# Patient Record
Sex: FEMALE | Race: WHITE | NOT HISPANIC OR LATINO | Employment: FULL TIME | URBAN - METROPOLITAN AREA
[De-identification: names, ages, dates, MRNs, and addresses within clinical notes are randomized per-mention and may not be internally consistent; named-entity substitution may affect disease eponyms.]

---

## 2023-10-24 ENCOUNTER — TRANSCRIBE ORDERS (OUTPATIENT)
Dept: ORTHOPEDIC SURGERY | Facility: HOSPITAL | Age: 28
End: 2023-10-24
Payer: MEDICAID

## 2023-10-24 DIAGNOSIS — M54.50 LOW BACK PAIN, UNSPECIFIED BACK PAIN LATERALITY, UNSPECIFIED CHRONICITY, UNSPECIFIED WHETHER SCIATICA PRESENT: ICD-10-CM

## 2023-10-26 PROBLEM — R10.9 STOMACH PAIN: Status: ACTIVE | Noted: 2023-10-26

## 2023-10-26 PROBLEM — Z97.5 USES INTRAUTERINE DEVICE FOR BIRTH CONTROL: Status: ACTIVE | Noted: 2023-10-26

## 2023-10-26 PROBLEM — R10.9 ABDOMINAL PAIN: Status: ACTIVE | Noted: 2023-10-26

## 2023-10-26 PROBLEM — N73.9 FEMALE PELVIC INFLAMMATORY DISEASE: Status: ACTIVE | Noted: 2023-10-26

## 2023-10-26 PROBLEM — N76.6 GENITAL ULCER, FEMALE: Status: ACTIVE | Noted: 2023-10-26

## 2023-10-26 PROBLEM — R31.9 HEMATURIA: Status: ACTIVE | Noted: 2023-10-26

## 2023-10-26 PROBLEM — N92.6 IRREGULAR BLEEDING: Status: ACTIVE | Noted: 2023-10-26

## 2023-10-26 PROBLEM — N70.93 TUBO-OVARIAN ABSCESS: Status: ACTIVE | Noted: 2023-10-26

## 2023-10-26 PROBLEM — N39.0 URINARY TRACT INFECTION: Status: RESOLVED | Noted: 2023-10-26 | Resolved: 2023-10-26

## 2023-10-26 PROBLEM — R50.9 FEVER: Status: ACTIVE | Noted: 2023-10-26

## 2023-10-26 PROBLEM — N39.0 URINARY TRACT INFECTION: Status: ACTIVE | Noted: 2023-10-26

## 2023-10-26 PROBLEM — N93.9 ABNORMAL VAGINAL BLEEDING: Status: ACTIVE | Noted: 2023-10-26

## 2023-10-26 PROBLEM — N90.89 VULVAR LESION: Status: ACTIVE | Noted: 2023-10-26

## 2023-10-26 PROBLEM — R10.2 VAGINAL PAIN: Status: ACTIVE | Noted: 2023-10-26

## 2023-10-26 RX ORDER — NITROFURANTOIN 25; 75 MG/1; MG/1
100 CAPSULE ORAL EVERY 12 HOURS
COMMUNITY
Start: 2022-04-18 | End: 2024-04-02 | Stop reason: WASHOUT

## 2023-10-26 RX ORDER — ACYCLOVIR 800 MG/1
800 TABLET ORAL
COMMUNITY
Start: 2022-04-21 | End: 2024-04-02 | Stop reason: WASHOUT

## 2023-10-27 ENCOUNTER — ANCILLARY PROCEDURE (OUTPATIENT)
Dept: RADIOLOGY | Facility: CLINIC | Age: 28
End: 2023-10-27
Payer: MEDICAID

## 2023-10-27 ENCOUNTER — OFFICE VISIT (OUTPATIENT)
Dept: ORTHOPEDIC SURGERY | Facility: CLINIC | Age: 28
End: 2023-10-27
Payer: MEDICAID

## 2023-10-27 DIAGNOSIS — M25.551 HIP PAIN, ACUTE, RIGHT: ICD-10-CM

## 2023-10-27 DIAGNOSIS — M54.50 LOW BACK PAIN, UNSPECIFIED BACK PAIN LATERALITY, UNSPECIFIED CHRONICITY, UNSPECIFIED WHETHER SCIATICA PRESENT: ICD-10-CM

## 2023-10-27 DIAGNOSIS — M25.551 HIP PAIN, ACUTE, RIGHT: Primary | ICD-10-CM

## 2023-10-27 PROCEDURE — 73502 X-RAY EXAM HIP UNI 2-3 VIEWS: CPT | Mod: RIGHT SIDE | Performed by: RADIOLOGY

## 2023-10-27 PROCEDURE — 73502 X-RAY EXAM HIP UNI 2-3 VIEWS: CPT | Mod: RT

## 2023-10-27 PROCEDURE — 72114 X-RAY EXAM L-S SPINE BENDING: CPT | Performed by: RADIOLOGY

## 2023-10-27 PROCEDURE — 72120 X-RAY BEND ONLY L-S SPINE: CPT

## 2023-10-27 PROCEDURE — 99203 OFFICE O/P NEW LOW 30 MIN: CPT | Performed by: ORTHOPAEDIC SURGERY

## 2023-10-27 PROCEDURE — 72110 X-RAY EXAM L-2 SPINE 4/>VWS: CPT | Mod: FY

## 2023-10-27 PROCEDURE — 1036F TOBACCO NON-USER: CPT | Performed by: ORTHOPAEDIC SURGERY

## 2023-10-27 ASSESSMENT — PAIN DESCRIPTION - DESCRIPTORS: DESCRIPTORS: SHARP;SHOOTING

## 2023-10-27 ASSESSMENT — PAIN SCALES - GENERAL: PAINLEVEL_OUTOF10: 3

## 2023-10-27 ASSESSMENT — PAIN - FUNCTIONAL ASSESSMENT: PAIN_FUNCTIONAL_ASSESSMENT: 0-10

## 2023-10-27 NOTE — PROGRESS NOTES
HPI:Fauzia Patel is a 28-year-old woman, who comes in with complaints of right buttock and thigh pain.  Her past medical history is significant for prior hip pinning as a child 12 years ago at the [facility].  She has pain with activity.  She denies numbness and tingling.      ROS:  Reviewed on EMR and patient intake sheet.    PMH/SH:  Reviewed on EMR and patient intake sheet.    Exam:  Physical Exam    Constitutional: Well appearing; no acute distress  Eyes: pupils are equal and round  Psych: normal affect  Respiratory: non-labored breathing  Cardiovascular: regular rate and rhythm  GI: non-distended abdomen  Musculoskeletal: Significant pain with attempted range of motion of the right hip   neurologic: [4 with the exception of 2/5 in right hip flexion]/5 strength in the lower extremities bilaterally and knee extension]; [-] straight leg raise; no clonus; negative babinski    Radiology:     X-rays demonstrate severe degeneration of the right hip with significant heterotopic ossification and the presence of 3 cannulated screws from prior hip pinning    Diagnosis:    Posttraumatic arthritis of the right hip with superimposed heterotopic ossification    Assessment and Plan:   28-year-old woman with severe posttraumatic arthritis in the right hip.  She will require evaluation by one of my colleagues for potential hip replacement.  I can see her back as needed.  Her lumbar spine would not require any treatment at this time.    The patient was in agreement with the plan. At the end of the visit today, the patient felt that all questions had been answered satisfactorily.  The patient was pleased with the visit and very appreciative for the care rendered.     Thank you very much for the kind referral.  It is a privilege, and a pleasure, to partner with you in the care of your patients.  I would be delighted to assist you with any further consultations as needed.  Please feel free to have your patients refer to my  website, [email or web address], for patient education materials regarding treatment options for common spinal conditions.        Margarito Landaverde MD    Chief of Spine Surgery, [facility]  Director of Spine Service, [facility]  , Department of Orthopaedics  [facility] [employer] [facility]  [address] [place] [address]  [email or web address]  P: [phone]    This note was dictated with voice recognition software.  It has not been proofread for grammatical errors, typographical mistakes or other semantic inconsistencies.

## 2023-12-13 ENCOUNTER — OFFICE VISIT (OUTPATIENT)
Dept: ORTHOPEDIC SURGERY | Facility: CLINIC | Age: 28
End: 2023-12-13
Payer: MEDICAID

## 2023-12-13 DIAGNOSIS — M16.51 UNILATERAL POST-TRAUMATIC OSTEOARTHRITIS, RIGHT HIP: Primary | ICD-10-CM

## 2023-12-13 DIAGNOSIS — M25.80 HETEROTOPIC OSSIFICATION OF JOINT: ICD-10-CM

## 2023-12-13 DIAGNOSIS — M25.551 HIP PAIN, ACUTE, RIGHT: ICD-10-CM

## 2023-12-13 PROCEDURE — 99214 OFFICE O/P EST MOD 30 MIN: CPT | Performed by: STUDENT IN AN ORGANIZED HEALTH CARE EDUCATION/TRAINING PROGRAM

## 2023-12-13 PROCEDURE — 1036F TOBACCO NON-USER: CPT | Performed by: STUDENT IN AN ORGANIZED HEALTH CARE EDUCATION/TRAINING PROGRAM

## 2023-12-13 ASSESSMENT — PAIN SCALES - GENERAL: PAINLEVEL_OUTOF10: 4

## 2023-12-13 ASSESSMENT — PAIN - FUNCTIONAL ASSESSMENT: PAIN_FUNCTIONAL_ASSESSMENT: 0-10

## 2023-12-13 NOTE — PROGRESS NOTES
Chief complaint: Right hip pain    Fauzia Patel is a pleasant 28 y.o. year-old female who is seen today for evaluation of right hip pain.  She has a history of right femoral neck pathologic fracture due to benign cyst with curettage grafting and prophylactic percutaneous screw fixation done at the [facility] back in 2011.  They used an anterior Suh-Abarca approach.  Since then, Fauzia reports that over the last 6 years she has had progressively worsening right hip pain.  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  She has known excruciating pain from her right hip, unable to stand for more than 1 hour at a time at her job working as a .  She endorses right hip stiffness, incredible pain that wakes her up at night.  She is very frustrated with the current level of pain in her right hip and how it limits her mobility and ability to perform her activities of daily living. The patient denies any numbness or tingling of the right lower extremity.    History reviewed. No pertinent past medical history.    Past Surgical History:   Procedure Laterality Date    CT GUIDED PERCUTANEOUS PERITONEAL OR RETROPERITONEAL FLUID COLLECTION DRAINAGE  2/13/2023    CT GUIDED PERCUTANEOUS PERITONEAL OR RETROPERITONEAL FLUID COLLECTION DRAINAGE [facility]     Social History     Socioeconomic History    Marital status: Single     Spouse name: None    Number of children: None    Years of education: None    Highest education level: None   Occupational History    None   Tobacco Use    Smoking status: Never     Passive exposure: Never    Smokeless tobacco: Never   Substance and Sexual Activity    Alcohol use: Never    Drug use: Never    Sexual activity: None   Other Topics Concern    None   Social History Narrative    None     Social Determinants of Health     Financial Resource Strain: Not on file   Food Insecurity: Not on file   Transportation Needs: Not on file    Physical Activity: Not on file   Stress: Not on file   Social Connections: Not on file   Intimate Partner Violence: Not on file   Housing Stability: Not on file     No Known Allergies      General: Well-appearing female in no acute distress.  Awake, alert and oriented.  Pleasant and cooperative.  Respiratory: Non-labored breathing  Mood: Euthymic   Gait: Antalgic  Assistive Device: None     Limb Length Discrepancy: 5 mm    Affected Right Hip  Range of motion:   Flexion: 70  Extension: 0  Internal Rotation: 5  External Rotation: 10  Abduction: 15  Hip Flexor Strength: 5/5  Abductor Strength: 5/5  Adductor Strength: 5/5  Tenderness: With hip thrust  Sensation: Intact to light touch distally  Motor function: Able to fire TA, EHL, G/S  Pulses: Palpable DP pulse    Unaffected Left Hip  Range of motion:   Flexion: 120  Extension: 0  Internal Rotation: 20  External Rotation: 40  Abduction: 35  Hip Flexor Strength: 5/5  Abductor Strength: 5/5  Adductor Strength: 5/5  Tenderness: None  Sensation: Intact to light touch distally  Motor function: Able to fire TA, EHL, G/S    Imaging:   AP pelvis, AP hip and false profile views: Independent review of right hip and pelvis x-rays was performed. The findings were reviewed with the patient. There are severe degenerative changes of the right hip with with evidence of prior screw fixation with associated joint space narrowing, subchondral sclerosis, and osteophyte formation. No evidence of fracture, AVN, dislocation, osteomyelitis.    Assessment/Plan:    Fauzia Patel is a very pleasant healthy 28 y.o. female presents with severe posttraumatic arthritis of the right hip.  She has a history of right femoral neck bone cyst curettage and grafting with screw fixation back in 2011 at [facility].  They used Synthes 6.5 mm cannulated screws x 3.  Since then she has developed severe arthritis of the right hip.  She is incredibly limited in her activities and is in constant pain.  She presents  today wanting to discuss total hip arthroplasty.  Reviewed treatment options with the patient, including conservative treatment versus total hip replacement.  At this time, I informed her that she would likely received minimal benefit from conservative treatment given the level of progression of her hip arthritis.  Discussed total hip arthroplasty with her in detail, including honestly discussing the risk of future revision given she will be getting a hip replacement before the age of 29.  Explained in detail the risks, benefits, alternatives to total hip arthroplasty.  She understood the inevitable risk of revision surgery given the average implant life expectancy of around 20 to 30 years.  Given the impact of her right hip pain on her quality of life, she would like to proceed with total hip arthroplasty.  She is curious if she might be able to receive surgery sooner than the earliest operative date that I might be able to give her in April 2024.  I will reach out to my arthroplasty colleagues to see if there are openings available for surgery sooner than April of next year.  In the interim, we will add her to the books for April of next year to get her on the schedule.  Will see her in follow up in March of next year to further discuss surgery if one of my colleagues is unable to operate sooner. All questions and concerns answered in detail at today's visit.     If she wishes to move forward with surgery, I will see her 1 month prior to surgery.  Will need to repeat the right hip radiographs as the ones that she has on file are insufficient for preoperative templating and planning.    The patient was seen and examined with my resident Dr. Tolentino who assisted with documentation.  I independently interviewed the patient to obtain a history and performed physical examination.  I formulated the plan of care.    ** This office note was dictated using Dragon voice to text software and was not proofread for spelling  or grammatical errors **

## 2024-01-15 DIAGNOSIS — M16.11 PRIMARY OSTEOARTHRITIS OF RIGHT HIP: ICD-10-CM

## 2024-01-29 ENCOUNTER — HOSPITAL ENCOUNTER (OUTPATIENT)
Facility: HOSPITAL | Age: 29
Setting detail: OUTPATIENT SURGERY
End: 2024-01-29
Attending: STUDENT IN AN ORGANIZED HEALTH CARE EDUCATION/TRAINING PROGRAM | Admitting: STUDENT IN AN ORGANIZED HEALTH CARE EDUCATION/TRAINING PROGRAM
Payer: MEDICAID

## 2024-01-29 PROBLEM — M16.11 PRIMARY OSTEOARTHRITIS OF RIGHT HIP: Status: ACTIVE | Noted: 2024-01-15

## 2024-03-12 ENCOUNTER — OFFICE VISIT (OUTPATIENT)
Dept: ORTHOPEDIC SURGERY | Facility: CLINIC | Age: 29
End: 2024-03-12
Payer: MEDICAID

## 2024-03-12 ENCOUNTER — HOSPITAL ENCOUNTER (OUTPATIENT)
Dept: RADIOLOGY | Facility: CLINIC | Age: 29
Discharge: HOME | End: 2024-03-12
Payer: MEDICAID

## 2024-03-12 DIAGNOSIS — M16.51 UNILATERAL POST-TRAUMATIC OSTEOARTHRITIS, RIGHT HIP: ICD-10-CM

## 2024-03-12 PROCEDURE — 99214 OFFICE O/P EST MOD 30 MIN: CPT | Performed by: STUDENT IN AN ORGANIZED HEALTH CARE EDUCATION/TRAINING PROGRAM

## 2024-03-12 PROCEDURE — 73501 X-RAY EXAM HIP UNI 1 VIEW: CPT | Mod: RT,59

## 2024-03-12 PROCEDURE — 73502 X-RAY EXAM HIP UNI 2-3 VIEWS: CPT | Mod: RT

## 2024-03-12 PROCEDURE — 73501 X-RAY EXAM HIP UNI 1 VIEW: CPT | Mod: RIGHT SIDE | Performed by: RADIOLOGY

## 2024-03-12 PROCEDURE — 1036F TOBACCO NON-USER: CPT | Performed by: STUDENT IN AN ORGANIZED HEALTH CARE EDUCATION/TRAINING PROGRAM

## 2024-03-12 PROCEDURE — 73502 X-RAY EXAM HIP UNI 2-3 VIEWS: CPT | Mod: RIGHT SIDE | Performed by: RADIOLOGY

## 2024-03-13 NOTE — PROGRESS NOTES
Chief complaint: Right hip pain    HPI: Fauzia Patel is a pleasant 28 y.o. year-old female who is seen today for preoperative consultation prior to upcoming right total hip arthroplasty.  The patient does have a history of right femoral neck fracture due to a benign cyst that was treated with curettage, which was treated with bone grafting as well as internal fixation.  This was done at [facility] in 2011.  The reduction was done via Suh-Abarca approach and the screws were placed through small incisions on the lateral aspect of the hip.    Patient continues to complain of disabling pain in the right hip.  Her quality life is negatively impacted.  Fauzia finds it difficult to work due to the pain.  She is frustrated with the level of pain.  She is also frustrated with her lack of mobility and limited range of motion.  She has not had any recent fall or trauma.    There has been no interval change in this patient's past medical, surgical, medications, allergies, family history or social history since the most recent visit to a provider within our department.  14 point review of systems was performed, reviewed, and negative except for pertinent positives documented in the history of present illness.    No past medical history on file.    Past Surgical History:   Procedure Laterality Date    CT GUIDED PERCUTANEOUS PERITONEAL OR RETROPERITONEAL FLUID COLLECTION DRAINAGE  2/13/2023    CT GUIDED PERCUTANEOUS PERITONEAL OR RETROPERITONEAL FLUID COLLECTION DRAINAGE [facility]     Social History     Socioeconomic History    Marital status: Single     Spouse name: Not on file    Number of children: Not on file    Years of education: Not on file    Highest education level: Not on file   Occupational History    Not on file   Tobacco Use    Smoking status: Never     Passive exposure: Never    Smokeless tobacco: Never   Substance and Sexual Activity    Alcohol use: Never    Drug use: Never    Sexual activity: Not on  file   Other Topics Concern    Not on file   Social History Narrative    Not on file     Social Determinants of Health     Financial Resource Strain: Not on file   Food Insecurity: Not on file   Transportation Needs: Not on file   Physical Activity: Not on file   Stress: Not on file   Social Connections: Not on file   Intimate Partner Violence: Not on file   Housing Stability: Not on file     No Known Allergies      General: Well-appearing female in no acute distress.  Awake, alert and oriented.  Pleasant and cooperative.  Respiratory: Non-labored breathing  Mood: Euthymic   Gait: Antalgic  Assistive Device: None     Limb Length Discrepancy: 5 mm    Affected Right Hip  Range of motion:   Flexion: 70  Extension: 0  Internal Rotation: 5  External Rotation: 10  Abduction: 15  Hip Flexor Strength: 5/5  Abductor Strength: 5/5  Adductor Strength: 5/5  Tenderness: With hip thrust  Sensation: Intact to light touch distally  Motor function: Able to fire TA, EHL, G/S  Pulses: Palpable DP pulse    Imaging:   AP pelvis, AP hip and false profile views: Independent review of right hip and pelvis x-rays was performed. The findings were reviewed with the patient. There are severe degenerative changes of the right hip with with evidence of prior screw fixation with associated joint space narrowing, subchondral sclerosis, and osteophyte formation. No evidence of fracture, AVN, dislocation, osteomyelitis.    She through lateral demonstrates about 10 to 15 degrees of anteversion of the femoral neck relative to the shaft    Assessment/Plan:    Fauzia Patel for more than six months has had limited function as well as persistent and severe pain which has negatively impacted the quality of life and interfered with activities of daily living. Under my care or the care of other providers, for greater than the three months, conservative treatment including activity modification, over the counter pain medications, physical therapy and/or  recommended home exercise program, have provided only minimal relief. The patient has not had an intra-articular injection in the past 3 months. The option to continue with conservative measures in lieu of arthroplasty was discussed and offered. However, given the failure of these conservative measures and the clinical and radiographic evidence of end-stage arthritis, the patient is a good candidate for an elective total hip arthroplasty. The stated potential benefits include pain relief, a feeling of improved joint motion and improved function were discussed but no guarantees were offered.    I talked with the patient at length about risks, limitations, benefits and alternatives to total hip replacement today. I reviewed risks and concerns including but not limited to implant wear, loosening, implant failure, infection, need for revision surgery, delayed wound healing, deep vein thrombosis, pulmonary embolism, stroke, other cardiopulmonary event, nerve or vascular injury, death and other medical and anesthetic complications of surgery. We talked about the potential for persistent pain following surgery since there are many possible causes for hip and leg pain. The patient was advised that hip replacement will only relieve pain that is coming from the hip. We talked about leg length discrepancy that may necessitate the use of a shoe lift, neurovascular problems such as foot drop and dislocation after surgery. The patient understands that we may have to lengthen the leg slightly to provide for adequate stability of the hip. I reviewed dislocation precautions and activity restrictions in detail. We discussed the concerns about intraoperative fracture, ingrowth failure, thigh pain and possible post-operative weight bearing restrictions following cementless hip replacement.  We discussed the possible need for a blood transfusion. We discussed the fact that many of our patients are able to go home in 1 day or the same  day depending on their health, mobility, pre-op preparation, individual home situation and personal preference. The patient should take our pre-operative teaching class. All of the patients questions were answered. The patient can call my office to schedule surgery and the pre-op teaching class. I told the patient that they should contact their primary care physician to discuss fitness for surgery. The patient was also encouraged to get dental clearance prior to surgery.     The patient acknowledged a clear understanding of these issues and expressed the desire to proceed with surgery once medical clearance has been obtained.    The patient has identified their personal goals of their joint replacement surgery and recovery and we have discussed them. These are documented in our [email or web address] patient engagement platform. In addition, we have discussed the advantages and disadvantages of various implant and fixation options, as well as various surgical approaches. The basic concepts of the joint replacement procedure has been reviewed with the patient and the patient has been provided the opportunity to see an actual implant either in the office or in our pre-op education class.    I also discussed with the patient the risks of being in the hospital environment in the setting of COVID-19. This risk was considered in light of the overall risk and benefit discussion related to the surgery. The patient's symptoms are severe and worsening, and cause an inability to perform activities of daily living. All possible precautions will be taken and length of stay will be limited as much as possible. The patient is fully aware of this after complete discussion and would like to proceed.    The patient has the following comorbidities that increase the risk of infection following joint replacement surgery: Previous open surgery, retained hardware. This was explicitly discussed with the patient and they would like to proceed with  surgery.     Surgical plan: Right total hip arthroplasty, conversion   Implants: Depuy Erick and Actis   Special equipment: Synthes and Tushar large screw removal systems   DVT prophylaxis:  Aspirin 81 mg BID for 4 weeks   Drugs to stop: none  Allergies to antibiotics: none  Antibiotic Plan: Ancef (+/- vancomycin pending MRSA screen)  Special clearance needed: No  Candidate for Outpatient: No  Meds-to-beds: Not d/w patient   Pain medication post op: Standard: Oxycodone, Tramadol and Tylenol   DME Recommendations: Hip Kit, Raised Toilet Seat if toilet seats at home are low,  Walker or Crutches depending on patient´s preference, Thigh high compression stocking. OPTIONAL: [employer]     * This office note was dictated using Dragon voice to text software and was not proofread for spelling or grammatical errors *      ** This office note was dictated using Dragon voice to text software and was not proofread for spelling or grammatical errors **

## 2024-03-28 ENCOUNTER — APPOINTMENT (OUTPATIENT)
Dept: PREADMISSION TESTING | Facility: HOSPITAL | Age: 29
End: 2024-03-28
Payer: MEDICAID

## 2024-04-02 ENCOUNTER — PRE-ADMISSION TESTING (OUTPATIENT)
Dept: PREADMISSION TESTING | Facility: HOSPITAL | Age: 29
End: 2024-04-02
Payer: MEDICAID

## 2024-04-02 ENCOUNTER — LAB (OUTPATIENT)
Dept: LAB | Facility: LAB | Age: 29
End: 2024-04-02
Payer: MEDICAID

## 2024-04-02 VITALS
WEIGHT: 224.87 LBS | DIASTOLIC BLOOD PRESSURE: 67 MMHG | OXYGEN SATURATION: 97 % | HEART RATE: 75 BPM | BODY MASS INDEX: 34.08 KG/M2 | TEMPERATURE: 97 F | HEIGHT: 68 IN | SYSTOLIC BLOOD PRESSURE: 138 MMHG | RESPIRATION RATE: 18 BRPM

## 2024-04-02 DIAGNOSIS — M16.51 UNILATERAL POST-TRAUMATIC OSTEOARTHRITIS, RIGHT HIP: ICD-10-CM

## 2024-04-02 DIAGNOSIS — Z01.818 PRE-OP TESTING: Primary | ICD-10-CM

## 2024-04-02 DIAGNOSIS — Z01.818 PRE-OP TESTING: ICD-10-CM

## 2024-04-02 DIAGNOSIS — Z01.818 ENCOUNTER FOR OTHER PREPROCEDURAL EXAMINATION: Primary | ICD-10-CM

## 2024-04-02 DIAGNOSIS — R73.09 ELEVATED RANDOM BLOOD GLUCOSE LEVEL: ICD-10-CM

## 2024-04-02 LAB
ABO GROUP (TYPE) IN BLOOD: NORMAL
ANION GAP SERPL CALC-SCNC: 11 MMOL/L (ref 10–20)
ANTIBODY SCREEN: NORMAL
BASOPHILS # BLD AUTO: 0.03 X10*3/UL (ref 0–0.1)
BASOPHILS NFR BLD AUTO: 0.4 %
BUN SERPL-MCNC: 12 MG/DL (ref 6–23)
CALCIUM SERPL-MCNC: 9.3 MG/DL (ref 8.6–10.3)
CHLORIDE SERPL-SCNC: 101 MMOL/L (ref 98–107)
CO2 SERPL-SCNC: 28 MMOL/L (ref 21–32)
CREAT SERPL-MCNC: 0.88 MG/DL (ref 0.5–1.05)
CRP SERPL-MCNC: 0.15 MG/DL
EGFRCR SERPLBLD CKD-EPI 2021: >90 ML/MIN/1.73M*2
EOSINOPHIL # BLD AUTO: 0.18 X10*3/UL (ref 0–0.7)
EOSINOPHIL NFR BLD AUTO: 2.2 %
ERYTHROCYTE [DISTWIDTH] IN BLOOD BY AUTOMATED COUNT: 12.1 % (ref 11.5–14.5)
ERYTHROCYTE [SEDIMENTATION RATE] IN BLOOD BY WESTERGREN METHOD: 31 MM/H (ref 0–20)
GLUCOSE SERPL-MCNC: 76 MG/DL (ref 74–99)
HCT VFR BLD AUTO: 46.2 % (ref 36–46)
HGB BLD-MCNC: 15.2 G/DL (ref 12–16)
IMM GRANULOCYTES # BLD AUTO: 0.03 X10*3/UL (ref 0–0.7)
IMM GRANULOCYTES NFR BLD AUTO: 0.4 % (ref 0–0.9)
LYMPHOCYTES # BLD AUTO: 2.34 X10*3/UL (ref 1.2–4.8)
LYMPHOCYTES NFR BLD AUTO: 29 %
MCH RBC QN AUTO: 30.2 PG (ref 26–34)
MCHC RBC AUTO-ENTMCNC: 32.9 G/DL (ref 32–36)
MCV RBC AUTO: 92 FL (ref 80–100)
MONOCYTES # BLD AUTO: 0.79 X10*3/UL (ref 0.1–1)
MONOCYTES NFR BLD AUTO: 9.8 %
NEUTROPHILS # BLD AUTO: 4.7 X10*3/UL (ref 1.2–7.7)
NEUTROPHILS NFR BLD AUTO: 58.2 %
NRBC BLD-RTO: 0 /100 WBCS (ref 0–0)
PLATELET # BLD AUTO: 323 X10*3/UL (ref 150–450)
POTASSIUM SERPL-SCNC: 4.5 MMOL/L (ref 3.5–5.3)
RBC # BLD AUTO: 5.03 X10*6/UL (ref 4–5.2)
RH FACTOR (ANTIGEN D): NORMAL
SODIUM SERPL-SCNC: 135 MMOL/L (ref 136–145)
WBC # BLD AUTO: 8.1 X10*3/UL (ref 4.4–11.3)

## 2024-04-02 PROCEDURE — 87081 CULTURE SCREEN ONLY: CPT | Mod: AHULAB | Performed by: NURSE PRACTITIONER

## 2024-04-02 PROCEDURE — 86140 C-REACTIVE PROTEIN: CPT

## 2024-04-02 PROCEDURE — 85652 RBC SED RATE AUTOMATED: CPT

## 2024-04-02 PROCEDURE — 80048 BASIC METABOLIC PNL TOTAL CA: CPT

## 2024-04-02 PROCEDURE — 36415 COLL VENOUS BLD VENIPUNCTURE: CPT

## 2024-04-02 PROCEDURE — 85025 COMPLETE CBC W/AUTO DIFF WBC: CPT

## 2024-04-02 PROCEDURE — 86901 BLOOD TYPING SEROLOGIC RH(D): CPT

## 2024-04-02 PROCEDURE — 86900 BLOOD TYPING SEROLOGIC ABO: CPT

## 2024-04-02 PROCEDURE — 99204 OFFICE O/P NEW MOD 45 MIN: CPT | Performed by: NURSE PRACTITIONER

## 2024-04-02 PROCEDURE — 86850 RBC ANTIBODY SCREEN: CPT

## 2024-04-02 PROCEDURE — 83036 HEMOGLOBIN GLYCOSYLATED A1C: CPT

## 2024-04-02 RX ORDER — ACETAMINOPHEN 500 MG
TABLET ORAL EVERY 6 HOURS PRN
COMMUNITY
End: 2024-05-31 | Stop reason: HOSPADM

## 2024-04-02 RX ORDER — CHLORHEXIDINE GLUCONATE ORAL RINSE 1.2 MG/ML
SOLUTION DENTAL
Qty: 475 ML | Refills: 0 | Status: SHIPPED | OUTPATIENT
Start: 2024-04-02 | End: 2024-05-31 | Stop reason: HOSPADM

## 2024-04-02 ASSESSMENT — ENCOUNTER SYMPTOMS
RESPIRATORY NEGATIVE: 1
FEVER: 0
WHEEZING: 0
GASTROINTESTINAL NEGATIVE: 1
NEUROLOGICAL NEGATIVE: 1
SHORTNESS OF BREATH: 0
WEAKNESS: 0
CONSTIPATION: 0
SKIN CHANGES: 0
CARDIOVASCULAR NEGATIVE: 1
CHILLS: 0
RHINORRHEA: 0
VISUAL CHANGE: 0
LIGHT-HEADEDNESS: 0
PALPITATIONS: 0
NECK NEGATIVE: 1
VOMITING: 0
CONSTITUTIONAL NEGATIVE: 1
DYSURIA: 0
WOUND: 0
BRUISES/BLEEDS EASILY: 0
ARTHRALGIAS: 1
TREMORS: 0
ABDOMINAL PAIN: 0
VERTIGO: 0
NUMBNESS: 0
COUGH: 0
DIFFICULTY URINATING: 0
TROUBLE SWALLOWING: 0
EYE DISCHARGE: 0
DIARRHEA: 0
NAUSEA: 0
DOUBLE VISION: 0
DYSPNEA AT REST: 0

## 2024-04-02 NOTE — PREPROCEDURE INSTRUCTIONS
Medication List            Accurate as of April 2, 2024  3:29 PM. Always use your most recent med list.                acetaminophen 500 mg tablet  Commonly known as: Tylenol  Notes to patient: May take morning of surgery if needed.      chlorhexidine 0.12 % solution  Commonly known as: Peridex  Swish for 30 seconds and spit 15mL of solution the night before and morning of surgery                      **Concerning above medication instructions, if medication is normally taken at night, continue normal schedule.**  **DO NOT TAKE NIGHT PRIOR AND MORNING OF SURGERY**    CONTACT SURGEON'S OFFICE IF YOU DEVELOP:  * Fever = 100.4 F   * New respiratory symptoms (e.g. cough, shortness of breath, respiratory distress, sore throat)  * Recent loss of taste or smell  *Flu like symptoms such as headache, fatigue or gastrointestinal symptoms  * You develop any open sores, shingles, burning or painful urination   AND/OR:  * You no longer wish to have the surgery.  * Any other personal circumstances change that may lead to the need to cancel or defer this surgery.  *You were admitted to any hospital within one week of your planned procedure.    SMOKING:  *Quitting smoking can make a huge difference to your health and recovery from surgery.    *If you need help with quitting, call 3-584-QUIT-NOW.    THE DAY BEFORE SURGERY:  *Do not eat any food after midnight the night before surgery.   *You are permitted to drink clear liquids (i.e. water, black coffee (no milk or cream), tea, apple juice and electrolyte drinks (gatorade)) up to 10 ounces, up to 2 hours before your arrival time.  *You may chew gum until 2 hours before your surgery    SURGICAL TIME  *You will be contacted between 2 p.m. and 6 p.m. the business day before your surgery with your arrival time.  *If you haven't received a call by 6pm, call [phone].  *Scheduled surgery times may change and you will be notified if this occurs-check your personal voicemail for any  updates.    ON THE MORNING OF SURGERY:  *Wear comfortable, loose fitting clothing.   *Do not use moisturizers, creams, lotions or perfume.  *All jewelry and valuables should be left at home.  *Prosthetic devices such as contact lenses, hearing aids, dentures, eyelash extensions, hairpins and body piercing must be removed before surgery.    BRING WITH YOU:  *Photo ID and insurance card  *Current list of medicines and allergies  *Pacemaker/Defibrillator/Heart stent cards  *CPAP machine and mask  *Slings/splints/crutches  *Copy of your complete Advanced Directive/DHPOA-if applicable  *Neurostimulator implant remote    PARKING AND ARRIVAL:  *Check in at the Main Entrance desk and let them know you are here for surgery.  *You will be directed to the 2nd floor surgical waiting area.    AFTER OUTPATIENT SURGERY:  *A responsible adult MUST accompany you at the time of discharge and stay with you for 24 hours after your surgery.  *You may NOT drive yourself home after surgery.  *You may use a taxi or ride sharing service ([employer], [employer]) to return home ONLY if you are accompanied by a friend or family member.  *Instructions for resuming your medications will be provided by your surgeon.

## 2024-04-02 NOTE — CPM/PAT H&P
[facility]/PAT Evaluation       Name: Fauzia Patel (Fauzia Patel)  /Age: 1995/28 y.o.         Date of Consult: 24    Referring Provider: Dr. Rios    Surgery, Date, and Length: Right Hip Arthroplasty-Right, 04/15/24, 180 min    Fauzia Patel is a 28 year-old female who presents to the [facility] for perioperative risk assessment prior to surgery.    Patient presents with a primary diagnosis of right hip pain.   Fauzia Patel is a pleasant 28 y.o. year-old female who is seen today for evaluation of right hip pain.  She has a history of right femoral neck pathologic fracture due to benign cyst with curettage grafting and prophylactic percutaneous screw fixation done at the [facility] back in .  They used an anterior Suh-Abarca approach.  Since then, Fauzia reports that over the last 6 years she has had progressively worsening right hip pain.  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  She has known excruciating pain from her right hip, unable to stand for more than 1 hour at a time at her job working as a .  She endorses right hip stiffness, incredible pain that wakes her up at night.  She is very frustrated with the current level of pain in her right hip and how it limits her mobility and ability to perform her activities of daily living. The patient denies any numbness or tingling of the right lower extremity.       This note was created in part upon personal review of patient's medical records.      Patient is scheduled to have a right hip arthroplasty.       Pt denies any past history of anesthetic complications such as PONV, awareness, prolonged sedation, dental damage, aspiration, cardiac arrest, difficult intubation, difficult I.V. access or unexpected hospital admissions.  NO malignant hyperthermia and or pseudocholinesterase deficiency.    No history of blood transfusions.    The patient is not a Orthodox and  will accept blood and blood products if medically indicated.     Type and screen sent.       Past Surgical History:   Procedure Laterality Date    CT GUIDED PERCUTANEOUS PERITONEAL OR RETROPERITONEAL FLUID COLLECTION DRAINAGE  2/13/2023    CT GUIDED PERCUTANEOUS PERITONEAL OR RETROPERITONEAL FLUID COLLECTION DRAINAGE [facility]       No family history on file.     No Known Allergies      Current Outpatient Medications:     acetaminophen (Tylenol) 500 mg tablet, Take by mouth every 6 hours if needed for mild pain (1 - 3)., Disp: , Rfl:     chlorhexidine (Peridex) 0.12 % solution, Swish for 30 seconds and spit 15mL of solution the night before and morning of surgery, Disp: 475 mL, Rfl: 0      PAT ROS:   Constitutional:   neg     no fever   no chills  Neuro/Psych:   neg     no numbness   no weakness   no light-headedness   no tremors  Eyes:    no discharge   no vision loss   no diplopia   no visual disturbance   use of corrective lenses  Ears:    no ear pain   no ear discharge   no hearing loss   no vertigo   no hearing aides  Nose:    no nasal discharge  Mouth:   neg    Throat:   neg     no throat pain   no dysphagia  Neck:   neg    Cardio:    Functional 4 Mets. Patient denies SOB walking up 2 flights of stairs   neg     no chest pain   no palpitations   no peripheral edema   no dyspnea  Respiratory:   neg     no cough   no wheezing   no shortness of breath  Endocrine:   GI:   neg     no abdominal pain   no constipation   no diarrhea   no nausea   no vomiting  :   neg     no difficulty urinating   no dysuria  Musculoskeletal:    arthralgias (right hip pain, eight years, over the past year worsening. shooting pain down leg and into lower back 7/10. Hurts worse with ambulation)  Hematologic:   neg     does not bruise/bleed easily   no history of blood transfusion   no blood clots  Skin:  neg     no skin changes   no sores/wound   no rash      Physical Exam  Constitutional:       Appearance: Normal  "appearance.   HENT:      Head: Normocephalic and atraumatic.      Mouth/Throat:      Mouth: Mucous membranes are moist.      Pharynx: Oropharynx is clear.   Eyes:      Conjunctiva/sclera: Conjunctivae normal.   Cardiovascular:      Rate and Rhythm: Normal rate and regular rhythm.      Pulses: Normal pulses.      Heart sounds: Normal heart sounds.   Pulmonary:      Effort: Pulmonary effort is normal.      Breath sounds: Normal breath sounds.   Abdominal:      General: Bowel sounds are normal.      Palpations: Abdomen is soft.   Musculoskeletal:      Cervical back: Normal range of motion.      Comments: Limited ROM in right hip. Does not currently use any assistive device to walk.   Skin:     General: Skin is warm and dry.      Capillary Refill: Capillary refill takes less than 2 seconds.   Neurological:      General: No focal deficit present.      Mental Status: She is alert and oriented to person, place, and time.          PAT AIRWAY:   Airway:     Mallampati::  IV    Neck ROM::  Full   No broken teeth, no dentures and no missing teeth         Visit Vitals  /67   Pulse 75   Temp 36.1 °C (97 °F)   Resp 18   Ht 1.72 m (5' 7.72\")   Wt 102 kg (224 lb 13.9 oz)   SpO2 97%   BMI 34.48 kg/m²   Smoking Status Never   BSA 2.21 m²      Social History     Socioeconomic History    Marital status: Single     Spouse name: Not on file    Number of children: Not on file    Years of education: Not on file    Highest education level: Not on file   Occupational History    Not on file   Tobacco Use    Smoking status: Never     Passive exposure: Never    Smokeless tobacco: Never   Substance and Sexual Activity    Alcohol use: Never    Drug use: Never    Sexual activity: Not on file   Other Topics Concern    Not on file   Social History Narrative    Not on file     Social Determinants of Health     Financial Resource Strain: Not on file   Food Insecurity: Not on file   Transportation Needs: Not on file   Physical Activity: Not on " file   Stress: Not on file   Social Connections: Not on file   Intimate Partner Violence: Not on file   Housing Stability: Not on file      Plan:    Neuro: History of Depression-not currently taking any medication for this.     Cardiovascular:  Patient denies any chest pain, tightness, heaviness, pressure, radiating pain, palpitations, irregular heartbeats, lightheadedness, cough, congestion, shortness of breath, MURILLO, PND, near syncope, weight loss or gain.     RCRI: 0  Risk of Mace: 3.9%      Pulm:  Denies any shortness of breath or activity intolerance.    Stop Bang= 0      GI/:  History of Pelvic Inflammatory Disease with T-O abcess.   02/13/2023-CT Guided Percutaneous Peritoneal or Retroperitoneal Fluid collection drainage.     Heme:  Patient instructed to ambulate as soon as possible postoperatively to decrease thromboembolic risk.    Initiate mechanical DVT prophylaxis as soon as possible and initiate chemical prophylaxis when deemed safe from a bleeding standpoint post surgery.    Caprini=6    Risk assessment complete.  Patient is scheduled for an intermediate risk surgery.       Labs/testing obtained in PAT on 04/02/24  CBC, BMP, T&S, MRSA, HgA1c, urine tox    Follow up: MRSA and urine Tox    Lab Results   Component Value Date    WBC 8.1 04/02/2024    HGB 15.2 04/02/2024    HCT 46.2 (H) 04/02/2024    MCV 92 04/02/2024     04/02/2024      Lab Results   Component Value Date    GLUCOSE 76 04/02/2024    CALCIUM 9.3 04/02/2024     (L) 04/02/2024    K 4.5 04/02/2024    CO2 28 04/02/2024     04/02/2024    BUN 12 04/02/2024    CREATININE 0.88 04/02/2024      Lab Results   Component Value Date    HGBA1C 4.8 04/02/2024     C-Reactive Protein  <1.00 mg/dL 0.15   Resulting Agency [facility]              Specimen Collected: 04/02/24 15:47 Last Resulted: 04/02/24 17:01           Sedimentation Rate  0 - 20 mm/h 31 High    Resulting Agency [facility]              Specimen Collected: 04/02/24 15:47 Last Resulted:  04/02/24 16:52           Labs reviewed. Sed rate slightly elevated. WBC and CRP wnl. Other labs unremarkable.     Communication:  ** Discussed social hx with patient. Patient states she vapes daily and will occasionally use cocaine.** She drinks alcohol socially.    Urine tox ordered. Patient unable to urinate today. Will be coming back on Thursday 04/04/24 to provide sample. Dr. Rios and Dr. Salazar notified.     Preoperative medication instructions were provided and reviewed with the patient.  Any additional testing or evaluation was explained to the patient.  Nothing by mouth instructions were discussed and patient's questions were answered prior to conclusion to this encounter.  Patient verbalized understanding of preoperative instructions given in preadmission testing; discharge instructions available in EMR.    This note was dictated with speech recognition.  Minor errors may have been detected during use of speech recognition.

## 2024-04-03 LAB
EST. AVERAGE GLUCOSE BLD GHB EST-MCNC: 91 MG/DL
HBA1C MFR BLD: 4.8 %

## 2024-04-04 LAB — STAPHYLOCOCCUS SPEC CULT: ABNORMAL

## 2024-04-11 ENCOUNTER — TELEPHONE (OUTPATIENT)
Dept: ORTHOPEDIC SURGERY | Facility: HOSPITAL | Age: 29
End: 2024-04-11

## 2024-04-11 ENCOUNTER — LAB (OUTPATIENT)
Dept: LAB | Facility: LAB | Age: 29
End: 2024-04-11
Payer: MEDICAID

## 2024-04-11 LAB
AMPHETAMINES UR QL SCN: ABNORMAL
BARBITURATES UR QL SCN: ABNORMAL
BENZODIAZ UR QL SCN: ABNORMAL
BZE UR QL SCN: ABNORMAL
CANNABINOIDS UR QL SCN: ABNORMAL
FENTANYL+NORFENTANYL UR QL SCN: ABNORMAL
METHADONE UR QL SCN: ABNORMAL
OPIATES UR QL SCN: ABNORMAL
OXYCODONE+OXYMORPHONE UR QL SCN: ABNORMAL
PCP UR QL SCN: ABNORMAL

## 2024-04-11 PROCEDURE — 80353 DRUG SCREENING COCAINE: CPT

## 2024-04-11 PROCEDURE — 80307 DRUG TEST PRSMV CHEM ANLYZR: CPT

## 2024-04-11 ASSESSMENT — HOOS JR
HOOS JR TOTAL INTERVAL SCORE: 32.74
WALKING ON UNEVEN SURFACE: SEVERE
RISING FROM SITTING: SEVERE
SITTING: SEVERE
GOING UP OR DOWN STAIRS: SEVERE
LYING IN BED (TURNING OVER, MAINTAINING HIP POSITION): SEVERE
BENDING TO THE FLOOR TO PICK UP OBJECT: SEVERE

## 2024-04-11 NOTE — TELEPHONE ENCOUNTER
Fauzia Patel  attended joint class on 4/11/2024 and Brought a care partner to the class. The preop survey was completed and the patient provided attestation that they understand the content reviewed and that they do have a care partner identified to assist with recovery. Patient was provided with a folder of materials and instructed to call orthopedic navigator with questions.

## 2024-04-16 LAB — BZE UR-MCNC: 1250 NG/ML

## 2024-05-03 ENCOUNTER — OFFICE VISIT (OUTPATIENT)
Dept: ORTHOPEDIC SURGERY | Facility: CLINIC | Age: 29
End: 2024-05-03
Payer: MEDICAID

## 2024-05-03 DIAGNOSIS — M16.51 UNILATERAL POST-TRAUMATIC OSTEOARTHRITIS, RIGHT HIP: Primary | ICD-10-CM

## 2024-05-03 DIAGNOSIS — F14.10 COCAINE ABUSE (MULTI): ICD-10-CM

## 2024-05-03 PROCEDURE — 99214 OFFICE O/P EST MOD 30 MIN: CPT | Performed by: STUDENT IN AN ORGANIZED HEALTH CARE EDUCATION/TRAINING PROGRAM

## 2024-05-03 PROCEDURE — 1036F TOBACCO NON-USER: CPT | Performed by: STUDENT IN AN ORGANIZED HEALTH CARE EDUCATION/TRAINING PROGRAM

## 2024-05-03 NOTE — PROGRESS NOTES
Chief complaint: Right hip pain    HPI: Fauzia Patel is a pleasant 28 y.o. year-old female who is seen today for preoperative consultation prior to upcoming right total hip arthroplasty.  The patient does have a history of right femoral neck fracture due to a benign cyst that was treated with curettage, which was treated with bone grafting as well as internal fixation.  This was done at [facility] in 2011.  The reduction was done via Suh-Abarca approach and the screws were placed through small incisions on the lateral aspect of the hip. She was previously scheduled for a right MESSI on 4/15/24 which has had to be rescheduled due to a positive drug screen (cocaine +). She is still interested in scheduling surgery and denies interval cocaine use.    Patient continues to complain of disabling pain in the right hip.  Her quality life is negatively impacted.  Fauzia finds it difficult to work due to the pain.  She is frustrated with the level of pain.  She is also frustrated with her lack of mobility and limited range of motion.  She has not had any recent fall or trauma.    There has been no interval change in this patient's past medical, surgical, medications, allergies, family history or social history since the most recent visit to a provider within our department.  14 point review of systems was performed, reviewed, and negative except for pertinent positives documented in the history of present illness.    History reviewed. No pertinent past medical history.    Past Surgical History:   Procedure Laterality Date    CT GUIDED PERCUTANEOUS PERITONEAL OR RETROPERITONEAL FLUID COLLECTION DRAINAGE  2/13/2023    CT GUIDED PERCUTANEOUS PERITONEAL OR RETROPERITONEAL FLUID COLLECTION DRAINAGE [facility]     Social History     Socioeconomic History    Marital status: Single     Spouse name: None    Number of children: None    Years of education: None    Highest education level: None   Occupational History    None    Tobacco Use    Smoking status: Never     Passive exposure: Never    Smokeless tobacco: Never   Substance and Sexual Activity    Alcohol use: Never    Drug use: Never    Sexual activity: None   Other Topics Concern    None   Social History Narrative    None     Social Determinants of Health     Financial Resource Strain: At Risk (2/10/2022)    Received from [email or web address]     Financial Resource Strain     Financial Resource Strain: 2   Food Insecurity: At Risk (2/10/2022)    Received from [email or web address]     Food Insecurity     Food: 2   Transportation Needs: At Risk (2/10/2022)    Received from [email or web address]     Transportation Needs     Transportation: 2   Physical Activity: Not on File (2021)    Received from [email or web address]     Physical Activity     Physical Activity: 0   Stress: At Risk (2/10/2022)    Received from [email or web address]     Stress     Stress: 2   Social Connections: Not at Risk (2/10/2022)    Received from [email or web address]     Social Connections     Social Connections and Isolation: 1   Intimate Partner Violence: Not on file   Housing Stability: At Risk (2/10/2022)    Received from [email or web address]     Housing Stability     Housin     No Known Allergies      General: Well-appearing female in no acute distress.  Awake, alert and oriented.  Pleasant and cooperative.  Respiratory: Non-labored breathing  Mood: Euthymic   Gait: Antalgic  Assistive Device: None     Limb Length Discrepancy: 5 mm    Affected Right Hip  Range of motion:   Flexion: 70  Extension: 0  Internal Rotation: 5  External Rotation: 10  Abduction: 15  Hip Flexor Strength: 5/5  Abductor Strength: 5/5  Adductor Strength: 5/5  Tenderness: With hip thrust  Sensation: Intact to light touch distally  Motor function: Able to fire TA, EHL, G/S  Pulses: Palpable DP pulse    Imaging:   AP pelvis, AP hip and false profile views: Independent review of right hip and pelvis x-rays was performed. The findings were reviewed with the patient. There are severe degenerative changes of the right hip with with  evidence of prior screw fixation with associated joint space narrowing, subchondral sclerosis, and osteophyte formation. No evidence of fracture, AVN, dislocation, osteomyelitis.    She through lateral demonstrates about 10 to 15 degrees of anteversion of the femoral neck relative to the shaft    Assessment/Plan:    Fauzia Patel for more than six months has had limited function as well as persistent and severe pain which has negatively impacted the quality of life and interfered with activities of daily living. Under my care or the care of other providers, for greater than the three months, conservative treatment including activity modification, over the counter pain medications, physical therapy and/or recommended home exercise program, have provided only minimal relief. The patient has not had an intra-articular injection in the past 3 months. The option to continue with conservative measures in lieu of arthroplasty was discussed and offered. However, given the failure of these conservative measures and the clinical and radiographic evidence of end-stage arthritis, the patient is a good candidate for an elective total hip arthroplasty. The stated potential benefits include pain relief, a feeling of improved joint motion and improved function were discussed but no guarantees were offered.    I talked with the patient at length about risks, limitations, benefits and alternatives to total hip replacement today. I reviewed risks and concerns including but not limited to implant wear, loosening, implant failure, infection, need for revision surgery, delayed wound healing, deep vein thrombosis, pulmonary embolism, stroke, other cardiopulmonary event, nerve or vascular injury, death and other medical and anesthetic complications of surgery. We talked about the potential for persistent pain following surgery since there are many possible causes for hip and leg pain. The patient was advised that hip replacement will  only relieve pain that is coming from the hip. We talked about leg length discrepancy that may necessitate the use of a shoe lift, neurovascular problems such as foot drop and dislocation after surgery. The patient understands that we may have to lengthen the leg slightly to provide for adequate stability of the hip. I reviewed dislocation precautions and activity restrictions in detail. We discussed the concerns about intraoperative fracture, ingrowth failure, thigh pain and possible post-operative weight bearing restrictions following cementless hip replacement.  We discussed the possible need for a blood transfusion. We discussed the fact that many of our patients are able to go home in 1 day or the same day depending on their health, mobility, pre-op preparation, individual home situation and personal preference. The patient should take our pre-operative teaching class. All of the patients questions were answered. The patient can call my office to schedule surgery and the pre-op teaching class. I told the patient that they should contact their primary care physician to discuss fitness for surgery. The patient was also encouraged to get dental clearance prior to surgery.     The patient acknowledged a clear understanding of these issues and expressed the desire to proceed with surgery once medical clearance has been obtained.    The patient has identified their personal goals of their joint replacement surgery and recovery and we have discussed them. These are documented in our [employer] patient engagement platform. In addition, we have discussed the advantages and disadvantages of various implant and fixation options, as well as various surgical approaches. The basic concepts of the joint replacement procedure has been reviewed with the patient and the patient has been provided the opportunity to see an actual implant either in the office or in our pre-op education class.    I also discussed with the patient the  risks of being in the hospital environment in the setting of COVID-19. This risk was considered in light of the overall risk and benefit discussion related to the surgery. The patient's symptoms are severe and worsening, and cause an inability to perform activities of daily living. All possible precautions will be taken and length of stay will be limited as much as possible. The patient is fully aware of this after complete discussion and would like to proceed.    The patient has the following comorbidities that increase the risk of infection following joint replacement surgery: Previous open surgery, retained hardware. This was explicitly discussed with the patient and they would like to proceed with surgery.     Today in the office, I had an extensive and lengthy conversation with the patient about her illicit drug use.  We discussed the risks of cocaine as a pertains to her overall health.  We also discussed the frequency with which she is using the substances.  The patient is adamant that it was a one-time thing.  I counseled the patient extensively about our resources as far as getting her hooked up for a drug recovery program or counseling at a drug rehab center.  The patient is adamant that she does not have a dependency problem and that she cannot stop.  I advised the patient that she needs to stop illicit substance use immediately.  She understands that she will be drug tested 1 week prior to surgery and on the day of surgery.    Surgical plan: Right total hip arthroplasty, conversion   Implants: Depuy Sprague and monoblock reclaim  Special equipment: Synthes and [place] large screw removal systems   DVT prophylaxis:  Aspirin 81 mg BID for 4 weeks   Drugs to stop: none  Allergies to antibiotics: none  Antibiotic Plan: Ancef (+/- vancomycin pending MRSA screen)  Special clearance needed: No  Candidate for Outpatient: No  Meds-to-beds: Not d/w patient   Pain medication post op: Standard: Oxycodone, Tramadol  and Tylenol   DME Recommendations: Hip Kit, Raised Toilet Seat if toilet seats at home are low,  Walker or Crutches depending on patient´s preference, Thigh high compression stocking. OPTIONAL: [employer]     The patient was seen and examined with my resident Dr. Valadez who assisted with documentation.  I independently interviewed the patient to obtain a history and performed physical examination.  I formulated the plan of care.    * This office note was dictated using Dragon voice to text software and was not proofread for spelling or grammatical errors *

## 2024-05-06 DIAGNOSIS — M16.11 PRIMARY OSTEOARTHRITIS OF RIGHT HIP: ICD-10-CM

## 2024-05-14 ENCOUNTER — APPOINTMENT (OUTPATIENT)
Dept: ORTHOPEDIC SURGERY | Facility: CLINIC | Age: 29
End: 2024-05-14
Payer: MEDICAID

## 2024-05-16 ENCOUNTER — TELEPHONE (OUTPATIENT)
Dept: PREADMISSION TESTING | Facility: HOSPITAL | Age: 29
End: 2024-05-16
Payer: MEDICAID

## 2024-05-16 ENCOUNTER — CLINICAL SUPPORT (OUTPATIENT)
Dept: PREADMISSION TESTING | Facility: HOSPITAL | Age: 29
End: 2024-05-16
Payer: MEDICAID

## 2024-05-16 DIAGNOSIS — M16.11 PRIMARY OSTEOARTHRITIS OF RIGHT HIP: ICD-10-CM

## 2024-05-20 ENCOUNTER — PRE-ADMISSION TESTING (OUTPATIENT)
Dept: PREADMISSION TESTING | Facility: HOSPITAL | Age: 29
End: 2024-05-20
Payer: MEDICAID

## 2024-05-21 ENCOUNTER — TELEPHONE (OUTPATIENT)
Dept: PREADMISSION TESTING | Facility: HOSPITAL | Age: 29
End: 2024-05-21
Payer: MEDICAID

## 2024-05-23 ENCOUNTER — PRE-ADMISSION TESTING (OUTPATIENT)
Dept: PREADMISSION TESTING | Facility: HOSPITAL | Age: 29
End: 2024-05-23
Payer: MEDICAID

## 2024-05-23 VITALS
RESPIRATION RATE: 16 BRPM | TEMPERATURE: 97.7 F | HEIGHT: 68 IN | HEART RATE: 79 BPM | OXYGEN SATURATION: 97 % | WEIGHT: 222.88 LBS | BODY MASS INDEX: 33.78 KG/M2 | DIASTOLIC BLOOD PRESSURE: 72 MMHG | SYSTOLIC BLOOD PRESSURE: 136 MMHG

## 2024-05-23 DIAGNOSIS — F14.10 COCAINE ABUSE (MULTI): ICD-10-CM

## 2024-05-23 PROCEDURE — 99204 OFFICE O/P NEW MOD 45 MIN: CPT | Performed by: NURSE PRACTITIONER

## 2024-05-23 RX ORDER — IBUPROFEN 200 MG
1000 TABLET ORAL EVERY 8 HOURS PRN
COMMUNITY
End: 2024-05-31 | Stop reason: HOSPADM

## 2024-05-23 ASSESSMENT — ENCOUNTER SYMPTOMS
CONSTIPATION: 0
BRUISES/BLEEDS EASILY: 0
RESPIRATORY NEGATIVE: 1
NECK NEGATIVE: 1
PALPITATIONS: 0
ABDOMINAL PAIN: 1
DYSPNEA AT REST: 0
CONSTITUTIONAL NEGATIVE: 1
DIARRHEA: 0
NEUROLOGICAL NEGATIVE: 1

## 2024-05-23 NOTE — CPM/PAT H&P
[facility] Evaluation       Name: Fauzia Patel (Fauzia Patel)  /Age: 1995/28 y.o.         Date of Consult: 24     Referring Provider: Dr. Rios    Surgery, Date, and Length: Right Hip Total Arthroplasty ~ Posterior Approach; Conversion to Total Hip Arthroplasty from Previous Open Surgery - Right, 24, 180 min    Fauzia Patel is a  year-old female who presents to the [facility] for perioperative risk assessment prior to surgery.    Patient presents with a primary diagnosis of right hip pain.   She has a history of right femoral neck pathologic fracture due to benign cyst with curettage grafting and prophylactic percutaneous screw fixation done at the [facility] [place] back in .  They used an anterior Suh-Abarca approach.  Since then, Fauzia reports that over the last 6 years she has had progressively worsening right hip pain.  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  She has known excruciating pain from her right hip, unable to stand for more than 1 hour at a time at her job working as a .  She endorses right hip stiffness, incredible pain that wakes her up at night.  She is very frustrated with the current level of pain in her right hip and how it limits her mobility and ability to perform her activities of daily living. The patient denies any numbness or tingling of the right lower extremity.      Of note, patient's previously scheduled surgery was cancelled due to a urine tox screen positive for cocaine.  Discussed social hx with patient. Patient states she vapes daily and will occasionally use cocaine.** She drinks alcohol socially.  Patient states she has not used cocaine since the last time she was positive in April.       This note was created in part upon personal review of patient's medical records.      Patient is scheduled to have a Right Hip Total Arthroplasty ~ Posterior Approach; Conversion to Total Hip  Arthroplasty from Previous Open Surgery - Right      Pt denies any past history of anesthetic complications such as PONV, awareness, prolonged sedation, dental damage, aspiration, cardiac arrest, difficult intubation, difficult I.V. access or unexpected hospital admissions.  NO malignant hyperthermia and or pseudocholinesterase deficiency.  No history of blood transfusions     The patient is not a Jainism and will accept blood and blood products if medically indicated.          Past Surgical History:   Procedure Laterality Date    CT GUIDED PERCUTANEOUS PERITONEAL OR RETROPERITONEAL FLUID COLLECTION DRAINAGE  2/13/2023    CT GUIDED PERCUTANEOUS PERITONEAL OR RETROPERITONEAL FLUID COLLECTION DRAINAGE [facility]     Social History     Socioeconomic History    Marital status: Single     Spouse name: Not on file    Number of children: Not on file    Years of education: Not on file    Highest education level: Not on file   Occupational History    Not on file   Tobacco Use    Smoking status: Never     Passive exposure: Never    Smokeless tobacco: Never   Substance and Sexual Activity    Alcohol use: Never    Drug use: Never    Sexual activity: Not on file   Other Topics Concern    Not on file   Social History Narrative    Not on file     Social Determinants of Health     Financial Resource Strain: At Risk (2/10/2022)    Received from [employer] [facility]     Financial Resource Strain     Financial Resource Strain: 2   Food Insecurity: At Risk (2/10/2022)    Received from [employer]     Food Insecurity     Food: 2   Transportation Needs: At Risk (2/10/2022)    Received from [employer]     Transportation Needs     Transportation: 2   Physical Activity: Not on File (5/23/2021)    Received from [employer]     Physical Activity     Physical Activity: 0   Stress: At Risk (2/10/2022)    Received from [employer]     Stress     Stress: 2   Social Connections: Not at Risk (2/10/2022)    Received from [employer]     Social Connections     Social  "Connections and Isolation: 1   Intimate Partner Violence: Not on file   Housing Stability: At Risk (2/10/2022)    Received from [facility]     [place]        No Known Allergies      Current Outpatient Medications:     acetaminophen (Tylenol) 500 mg tablet, Take by mouth every 6 hours if needed for mild pain (1 - 3)., Disp: , Rfl:     ibuprofen 200 mg tablet, Take 5 tablets (1,000 mg) by mouth every 8 hours if needed for mild pain (1 - 3)., Disp: , Rfl:     chlorhexidine (Peridex) 0.12 % solution, Swish for 30 seconds and spit 15mL of solution the night before and morning of surgery, Disp: 475 mL, Rfl: 0     Visit Vitals  /72   Pulse 79   Temp 36.5 °C (97.7 °F) (Tympanic)   Resp 16   Ht 1.735 m (5' 8.31\")   Wt 101 kg (222 lb 14.2 oz)   SpO2 97%   BMI 33.59 kg/m²   Smoking Status Never   BSA 2.21 m²         PAT ROS:   Constitutional:   neg    Neuro/Psych:   neg    Eyes:    use of corrective lenses  Ears:    no hearing aides  Nose:   Mouth:   neg    Throat:   neg    Neck:   neg    Cardio:    no chest pain   no palpitations   no dyspnea  Respiratory:   neg    Endocrine:   GI:    abdominal pain (intermittent abdominal pain)   no constipation   no diarrhea  :   neg    Musculoskeletal:    Right hip pain 8-10/10 both at rest and with movement   Hematologic:    does not bruise/bleed easily   no history of blood transfusion   no blood clots  Skin:  neg        Physical Exam  Physical exam within normal limits.   Musculoskeletal:      Comments: Right hip pain, patient walking with limp. Does not use any assistive devices.          PAT AIRWAY:   Airway:     Mallampati::  I    Neck ROM::  Full   No broken teeth, no dentures and no missing teeth           Visit Vitals  /72   Pulse 79   Temp 36.5 °C (97.7 °F) (Tympanic)   Resp 16   Ht 1.735 m (5' 8.31\")   Wt 101 kg (222 lb 14.2 oz)   SpO2 97%   BMI 33.59 kg/m²   Smoking Status Never   BSA 2.21 m²        Plan    Neuro:  History of depression-not " currently taking any medication for this.    Cardiovascular:  Patient denies any chest pain, tightness, heaviness, pressure, radiating pain, palpitations, irregular heartbeats, lightheadedness, cough, congestion, shortness of breath, MURILLO, PND, near syncope, weight loss or gain.     RCRI: 0 Risk of Mace:3.9%    Pulm:  Denies any shortness of breath or activity intolerance.     Stop Bang= 0    Renal/endo:  History of Pelvic Inflammatory Disease with T-O abcess.   02/13/2023-CT Guided Percutaneous Peritoneal or Retroperitoneal Fluid collection drainage.    Heme:  Patient instructed to ambulate as soon as possible postoperatively to decrease thromboembolic risk.    Initiate mechanical DVT prophylaxis as soon as possible and initiate chemical prophylaxis when deemed safe from a bleeding standpoint post surgery.    Caprini=6      Risk assessment complete.  Patient is scheduled for an intermediate surgical risk procedure.         Labs/testing   Urine Tox ordered, patient unable to give sample today.     Lab Results   Component Value Date    WBC 8.1 04/02/2024    HGB 15.2 04/02/2024    HCT 46.2 (H) 04/02/2024    MCV 92 04/02/2024     04/02/2024      Lab Results   Component Value Date    GLUCOSE 76 04/02/2024    CALCIUM 9.3 04/02/2024     (L) 04/02/2024    K 4.5 04/02/2024    CO2 28 04/02/2024     04/02/2024    BUN 12 04/02/2024    CREATININE 0.88 04/02/2024      Lab Results   Component Value Date    HGBA1C 4.8 04/02/2024      Drug Screen, Urine With Reflex to Confirmation  Order: [id number]   Status: Final result       Visible to patient: Yes (not seen)       Dx: Cocaine abuse (Multi)    0 Result Notes        Component  Ref Range & Units 2 d ago 1 mo ago 5 yr ago   Amphetamine Screen, Urine  Presumptive Negative Presumptive Negative Presumptive Negative CM PRESUMPTIVE NEGATIVE R, CM   Comment: CUTOFF LEVEL: 500 NG/ML  Cross-reactivity has been reported with high concentrations  of the following drugs:  buproprion, chloroquine, chlorpromazine,  ephedrine, mephentermine, fenfluramine, phentermine,  phenylpropanolamine, pseudoephedrine, and propranolol.   Barbiturate Screen, Urine  Presumptive Negative Presumptive Negative Presumptive Negative CM PRESUMPTIVE NEGATIVE R, CM   Comment: CUTOFF LEVEL: 200 NG/ML   Benzodiazepines Screen, Urine  Presumptive Negative Presumptive Negative Presumptive Negative CM    Comment: CUTOFF LEVEL: 200 NG/ML   Cannabinoid Screen, Urine  Presumptive Negative Presumptive Positive Abnormal  Presumptive Negative CM PRESUMPTIVE POSITIVE Abnormal  R, CM   Comment: CUTOFF LEVEL: 50 NG/ML   Cocaine Metabolite Screen, Urine  Presumptive Negative Presumptive Negative Presumptive Positive Abnormal  CM PRESUMPTIVE NEGATIVE R, CM   Comment: CUTOFF LEVEL: 150 NG/ML   Fentanyl Screen, Urine  Presumptive Negative Presumptive Negative Presumptive Negative CM    Comment: CUTOFF LEVEL: 5 NG/ML   Opiate Screen, Urine  Presumptive Negative Presumptive Negative Presumptive Negative CM PRESUMPTIVE NEGATIVE R, CM   Comment: CUTOFF LEVEL: 300 NG/ML  The opiate screen does not detect fentanyl, meperidine, or  tramadol. Oxycodone is not consistently detected (refer to  Oxycodone Screen, Urine result).   Oxycodone Screen, Urine  Presumptive Negative Presumptive Negative Presumptive Negative CM PRESUMPTIVE NEGATIVE R, CM   Comment: CUTOFF LEVEL: 100 NG/ML  This test will accurately detect both oxycodone and oxymorphone.   PCP Screen, Urine  Presumptive Negative Presumptive Negative Presumptive Negative CM PRESUMPTIVE NEGATIVE R, CM   Comment: CUTOFF LEVEL:  25 NG/ML  Cross-reactivity has been reported with dextromethorphan.   Methadone Screen, Urine  Presumptive Negative Presumptive Negative Presumptive Negative CM PRESUMPTIVE NEGATIVE R, CM   Comment: CUTOFF LEVEL: 150 NG/ML  The metabolite L-alpha-acetylmethadol (LAAM) is not  detected by this method in concentrations that would  be found in the urine of  patients on LAAM therapy.   Resulting Agency [facility]          Preoperative medication instructions were provided and reviewed with the patient.  Any additional testing or evaluation was explained to the patient.  Nothing by mouth instructions were discussed and patient's questions were answered prior to conclusion to this encounter.  Patient verbalized understanding of preoperative instructions given in preadmission testing; discharge instructions available in EMR.    This note was dictated with speech recognition.  Minor errors may have been detected during use of speech recognition.

## 2024-05-23 NOTE — PREPROCEDURE INSTRUCTIONS
Medication List            Accurate as of May 23, 2024  2:32 PM. Always use your most recent med list.                acetaminophen 500 mg tablet  Commonly known as: Tylenol  Notes to patient: May use day of surgery if needed     chlorhexidine 0.12 % solution  Commonly known as: Peridex  Swish for 30 seconds and spit 15mL of solution the night before and morning of surgery     ibuprofen 200 mg tablet  Medication Adjustments for Surgery: Stop 7 days before surgery                        **Concerning above medication instructions, if medication is normally taken at night, continue normal schedule.**  **DO NOT TAKE NIGHT PRIOR AND MORNING OF SURGERY**    CONTACT SURGEON'S OFFICE IF YOU DEVELOP:  * Fever = 100.4 F   * New respiratory symptoms (e.g. cough, shortness of breath, respiratory distress, sore throat)  * Recent loss of taste or smell  *Flu like symptoms such as headache, fatigue or gastrointestinal symptoms  * You develop any open sores, shingles, burning or painful urination   AND/OR:  * You no longer wish to have the surgery.  * Any other personal circumstances change that may lead to the need to cancel or defer this surgery.  *You were admitted to any hospital within one week of your planned procedure.    SMOKING:  *Quitting smoking can make a huge difference to your health and recovery from surgery.    *If you need help with quitting, call 9-188-QUIT-NOW.    THE DAY BEFORE SURGERY:  *Do not eat any food after midnight the night before surgery.   *You are permitted to drink clear liquids (i.e. water, black coffee (no milk or cream), tea, apple juice and electrolyte drinks (gatorade)) up to 13.5 ounces, up to 2 hours before your arrival time.  *You may chew gum until 2 hours before your surgery    SURGICAL TIME  *You will be contacted between 2 p.m. and 6 p.m. the business day before your surgery with your arrival time.  *If you haven't received a call by 6pm, call [phone].  *Scheduled surgery times  may change and you will be notified if this occurs-check your personal voicemail for any updates.    ON THE MORNING OF SURGERY:  *Wear comfortable, loose fitting clothing.   *Do not use moisturizers, creams, lotions or perfume.  *All jewelry and valuables should be left at home.  *Prosthetic devices such as contact lenses, hearing aids, dentures, eyelash extensions, hairpins and body piercing must be removed before surgery.    BRING WITH YOU:  *Photo ID and insurance card  *Current list of medicines and allergies  *Pacemaker/Defibrillator/Heart stent cards  *CPAP machine and mask  *Slings/splints/crutches  *Copy of your complete Advanced Directive/DHPOA-if applicable  *Neurostimulator implant remote    PARKING AND ARRIVAL:  *Check in at the Main Entrance desk and let them know you are here for surgery.  *You will be directed to the 2nd floor surgical waiting area.    AFTER OUTPATIENT SURGERY:  *A responsible adult MUST accompany you at the time of discharge and stay with you for 24 hours after your surgery.  *You may NOT drive yourself home after surgery.  *You may use a taxi or ride sharing service ([employer], Uber) to return home ONLY if you are accompanied by a friend or family member.  *Instructions for resuming your medications will be provided by your surgeon.

## 2024-05-24 ENCOUNTER — LAB (OUTPATIENT)
Dept: LAB | Facility: LAB | Age: 29
End: 2024-05-24
Payer: MEDICAID

## 2024-05-24 DIAGNOSIS — F14.10 COCAINE ABUSE (MULTI): ICD-10-CM

## 2024-05-24 PROCEDURE — 80349 CANNABINOIDS NATURAL: CPT

## 2024-05-24 PROCEDURE — 80307 DRUG TEST PRSMV CHEM ANLYZR: CPT

## 2024-05-28 LAB — CARBOXYTHC UR-MCNC: 267 NG/ML

## 2024-05-30 ENCOUNTER — ANESTHESIA EVENT (OUTPATIENT)
Dept: OPERATING ROOM | Facility: HOSPITAL | Age: 29
End: 2024-05-30
Payer: MEDICAID

## 2024-05-30 DIAGNOSIS — F14.10 COCAINE ABUSE (MULTI): ICD-10-CM

## 2024-05-30 PROBLEM — E66.9 OBESITY: Status: ACTIVE | Noted: 2024-05-30

## 2024-05-30 RX ORDER — ONDANSETRON HYDROCHLORIDE 2 MG/ML
4 INJECTION, SOLUTION INTRAVENOUS ONCE AS NEEDED
Status: CANCELLED | OUTPATIENT
Start: 2024-05-30

## 2024-05-30 RX ORDER — ONDANSETRON 4 MG/1
4 TABLET, FILM COATED ORAL EVERY 8 HOURS PRN
Qty: 20 TABLET | Refills: 0 | Status: SHIPPED | OUTPATIENT
Start: 2024-05-30

## 2024-05-30 RX ORDER — OXYCODONE HYDROCHLORIDE 5 MG/1
5 TABLET ORAL EVERY 4 HOURS PRN
Status: CANCELLED | OUTPATIENT
Start: 2024-05-30

## 2024-05-30 RX ORDER — TRAMADOL HYDROCHLORIDE 50 MG/1
50-100 TABLET ORAL EVERY 6 HOURS PRN
Qty: 40 TABLET | Refills: 0 | Status: SHIPPED | OUTPATIENT
Start: 2024-05-30 | End: 2024-06-06

## 2024-05-30 RX ORDER — NAPROXEN SODIUM 220 MG/1
81 TABLET, FILM COATED ORAL 2 TIMES DAILY
Qty: 60 TABLET | Refills: 0 | Status: SHIPPED | OUTPATIENT
Start: 2024-05-30 | End: 2024-06-29

## 2024-05-30 RX ORDER — PANTOPRAZOLE SODIUM 40 MG/1
40 TABLET, DELAYED RELEASE ORAL
Qty: 30 TABLET | Refills: 0 | Status: SHIPPED | OUTPATIENT
Start: 2024-05-30 | End: 2024-06-29

## 2024-05-30 RX ORDER — IPRATROPIUM BROMIDE 0.5 MG/2.5ML
500 SOLUTION RESPIRATORY (INHALATION) ONCE
Status: CANCELLED | OUTPATIENT
Start: 2024-05-30 | End: 2024-05-30

## 2024-05-30 RX ORDER — LIDOCAINE HYDROCHLORIDE 10 MG/ML
0.1 INJECTION, SOLUTION EPIDURAL; INFILTRATION; INTRACAUDAL; PERINEURAL ONCE
Status: CANCELLED | OUTPATIENT
Start: 2024-05-30 | End: 2024-05-30

## 2024-05-30 RX ORDER — ACETAMINOPHEN 500 MG
1000 TABLET ORAL EVERY 8 HOURS
Qty: 60 TABLET | Refills: 1 | Status: SHIPPED | OUTPATIENT
Start: 2024-05-30 | End: 2024-06-19

## 2024-05-30 RX ORDER — ALBUTEROL SULFATE 0.83 MG/ML
2.5 SOLUTION RESPIRATORY (INHALATION) ONCE AS NEEDED
Status: CANCELLED | OUTPATIENT
Start: 2024-05-30

## 2024-05-30 RX ORDER — OXYCODONE HYDROCHLORIDE 5 MG/1
5-10 TABLET ORAL EVERY 6 HOURS PRN
Qty: 40 TABLET | Refills: 0 | Status: SHIPPED | OUTPATIENT
Start: 2024-05-30 | End: 2024-06-06

## 2024-05-30 RX ORDER — DOCUSATE SODIUM 100 MG/1
100 CAPSULE, LIQUID FILLED ORAL 2 TIMES DAILY
Qty: 30 CAPSULE | Refills: 0 | Status: SHIPPED | OUTPATIENT
Start: 2024-05-30 | End: 2024-06-14

## 2024-05-30 RX ORDER — DROPERIDOL 2.5 MG/ML
0.62 INJECTION, SOLUTION INTRAMUSCULAR; INTRAVENOUS ONCE AS NEEDED
Status: CANCELLED | OUTPATIENT
Start: 2024-05-30

## 2024-05-30 RX ORDER — SENNOSIDES 8.6 MG/1
1 TABLET ORAL DAILY
Qty: 15 TABLET | Refills: 0 | Status: SHIPPED | OUTPATIENT
Start: 2024-05-30 | End: 2024-06-14

## 2024-05-30 RX ORDER — ACETAMINOPHEN 325 MG/1
650 TABLET ORAL EVERY 4 HOURS PRN
Status: CANCELLED | OUTPATIENT
Start: 2024-05-30

## 2024-05-30 RX ORDER — SODIUM CHLORIDE, SODIUM LACTATE, POTASSIUM CHLORIDE, CALCIUM CHLORIDE 600; 310; 30; 20 MG/100ML; MG/100ML; MG/100ML; MG/100ML
100 INJECTION, SOLUTION INTRAVENOUS CONTINUOUS
Status: CANCELLED | OUTPATIENT
Start: 2024-05-30

## 2024-05-30 RX ORDER — CEFADROXIL 500 MG/1
500 CAPSULE ORAL 2 TIMES DAILY
Qty: 10 CAPSULE | Refills: 0 | Status: SHIPPED | OUTPATIENT
Start: 2024-05-30 | End: 2024-06-04

## 2024-05-30 NOTE — ANESTHESIA PREPROCEDURE EVALUATION
Patient: Fauzia Patel    Procedure Information       Date/Time: 05/31/24 1030    Procedure: Right Hip Total Arthroplasty ~ Posterior Approach; Conversion to Total Hip Arthroplasty from Previous Open Surgery (Right: Hip) - **STAT TOX SCREEN PRE-OP**    Location: [facility] OR  / [facility] OR    Surgeons: Bay Rios MD            Relevant Problems   Endocrine   (+) Obesity      Musculoskeletal   (+) Primary osteoarthritis of right hip       Clinical information reviewed:                   NPO Detail:  No data recorded     Physical Exam    Airway  Mallampati: I     Cardiovascular   Rhythm: regular  Rate: normal     Dental    Pulmonary   Breath sounds clear to auscultation     Abdominal          Anesthesia Plan    History of general anesthesia?: yes  History of complications of general anesthesia?: no    ASA 2     regional     intravenous induction   Anesthetic plan and risks discussed with patient.    Plan discussed with CRNA and CAA.

## 2024-05-31 ENCOUNTER — ANESTHESIA (OUTPATIENT)
Dept: OPERATING ROOM | Facility: HOSPITAL | Age: 29
End: 2024-05-31
Payer: MEDICAID

## 2024-05-31 ENCOUNTER — APPOINTMENT (OUTPATIENT)
Dept: RADIOLOGY | Facility: HOSPITAL | Age: 29
End: 2024-05-31
Payer: MEDICAID

## 2024-05-31 ENCOUNTER — HOSPITAL ENCOUNTER (OUTPATIENT)
Facility: HOSPITAL | Age: 29
Setting detail: SURGERY ADMIT
Discharge: HOME | End: 2024-05-31
Attending: STUDENT IN AN ORGANIZED HEALTH CARE EDUCATION/TRAINING PROGRAM | Admitting: STUDENT IN AN ORGANIZED HEALTH CARE EDUCATION/TRAINING PROGRAM
Payer: MEDICAID

## 2024-05-31 VITALS
WEIGHT: 222.66 LBS | HEART RATE: 81 BPM | TEMPERATURE: 96.8 F | HEIGHT: 68 IN | BODY MASS INDEX: 33.75 KG/M2 | RESPIRATION RATE: 16 BRPM | DIASTOLIC BLOOD PRESSURE: 84 MMHG | OXYGEN SATURATION: 98 % | SYSTOLIC BLOOD PRESSURE: 152 MMHG

## 2024-05-31 DIAGNOSIS — M16.51 POST-TRAUMATIC OSTEOARTHRITIS OF RIGHT HIP: Primary | ICD-10-CM

## 2024-05-31 LAB
AMPHETAMINES UR QL SCN: ABNORMAL
AMPHETAMINES UR QL SCN: ABNORMAL
BARBITURATES UR QL SCN: ABNORMAL
BARBITURATES UR QL SCN: ABNORMAL
BENZODIAZ UR QL SCN: ABNORMAL
BENZODIAZ UR QL SCN: ABNORMAL
BZE UR QL SCN: ABNORMAL
BZE UR QL SCN: ABNORMAL
CANNABINOIDS UR QL SCN: ABNORMAL
CANNABINOIDS UR QL SCN: ABNORMAL
FENTANYL+NORFENTANYL UR QL SCN: ABNORMAL
FENTANYL+NORFENTANYL UR QL SCN: ABNORMAL
METHADONE UR QL SCN: ABNORMAL
METHADONE UR QL SCN: ABNORMAL
OPIATES UR QL SCN: ABNORMAL
OPIATES UR QL SCN: ABNORMAL
OXYCODONE+OXYMORPHONE UR QL SCN: ABNORMAL
OXYCODONE+OXYMORPHONE UR QL SCN: ABNORMAL
PCP UR QL SCN: ABNORMAL
PCP UR QL SCN: ABNORMAL
PREGNANCY TEST URINE, POC: NEGATIVE

## 2024-05-31 PROCEDURE — 2500000005 HC RX 250 GENERAL PHARMACY W/O HCPCS: Performed by: STUDENT IN AN ORGANIZED HEALTH CARE EDUCATION/TRAINING PROGRAM

## 2024-05-31 PROCEDURE — 2500000004 HC RX 250 GENERAL PHARMACY W/ HCPCS (ALT 636 FOR OP/ED): Performed by: STUDENT IN AN ORGANIZED HEALTH CARE EDUCATION/TRAINING PROGRAM

## 2024-05-31 PROCEDURE — 2500000001 HC RX 250 WO HCPCS SELF ADMINISTERED DRUGS (ALT 637 FOR MEDICARE OP): Performed by: STUDENT IN AN ORGANIZED HEALTH CARE EDUCATION/TRAINING PROGRAM

## 2024-05-31 PROCEDURE — 81025 URINE PREGNANCY TEST: CPT | Performed by: STUDENT IN AN ORGANIZED HEALTH CARE EDUCATION/TRAINING PROGRAM

## 2024-05-31 PROCEDURE — 80307 DRUG TEST PRSMV CHEM ANLYZR: CPT | Performed by: STUDENT IN AN ORGANIZED HEALTH CARE EDUCATION/TRAINING PROGRAM

## 2024-05-31 RX ORDER — FENTANYL CITRATE 50 UG/ML
INJECTION, SOLUTION INTRAMUSCULAR; INTRAVENOUS CONTINUOUS PRN
Status: DISCONTINUED | OUTPATIENT
Start: 2024-05-31 | End: 2024-06-01 | Stop reason: HOSPADM

## 2024-05-31 RX ORDER — PROPOFOL 10 MG/ML
INJECTION, EMULSION INTRAVENOUS CONTINUOUS PRN
Status: DISCONTINUED | OUTPATIENT
Start: 2024-05-31 | End: 2024-06-01 | Stop reason: HOSPADM

## 2024-05-31 RX ORDER — SODIUM CHLORIDE, SODIUM LACTATE, POTASSIUM CHLORIDE, CALCIUM CHLORIDE 600; 310; 30; 20 MG/100ML; MG/100ML; MG/100ML; MG/100ML
100 INJECTION, SOLUTION INTRAVENOUS CONTINUOUS
Status: DISCONTINUED | OUTPATIENT
Start: 2024-05-31 | End: 2024-05-31 | Stop reason: HOSPADM

## 2024-05-31 RX ORDER — ACETAMINOPHEN 325 MG/1
650 TABLET ORAL ONCE
Status: COMPLETED | OUTPATIENT
Start: 2024-05-31 | End: 2024-05-31

## 2024-05-31 RX ORDER — CELECOXIB 200 MG/1
200 CAPSULE ORAL ONCE
Status: COMPLETED | OUTPATIENT
Start: 2024-05-31 | End: 2024-05-31

## 2024-05-31 RX ORDER — MIDAZOLAM HYDROCHLORIDE 1 MG/ML
INJECTION INTRAMUSCULAR; INTRAVENOUS CONTINUOUS PRN
Status: DISCONTINUED | OUTPATIENT
Start: 2024-05-31 | End: 2024-06-01 | Stop reason: HOSPADM

## 2024-05-31 RX ADMIN — ACETAMINOPHEN 650 MG: 325 TABLET ORAL at 08:54

## 2024-05-31 RX ADMIN — POVIDONE-IODINE 1 APPLICATION: 5 SOLUTION TOPICAL at 08:54

## 2024-05-31 RX ADMIN — SODIUM CHLORIDE, POTASSIUM CHLORIDE, SODIUM LACTATE AND CALCIUM CHLORIDE 100 ML/HR: 600; 310; 30; 20 INJECTION, SOLUTION INTRAVENOUS at 08:56

## 2024-05-31 RX ADMIN — VANCOMYCIN HYDROCHLORIDE 1500 MG: 1.5 INJECTION, POWDER, LYOPHILIZED, FOR SOLUTION INTRAVENOUS at 08:54

## 2024-05-31 RX ADMIN — CELECOXIB 200 MG: 200 CAPSULE ORAL at 08:54

## 2024-05-31 ASSESSMENT — COLUMBIA-SUICIDE SEVERITY RATING SCALE - C-SSRS
6. HAVE YOU EVER DONE ANYTHING, STARTED TO DO ANYTHING, OR PREPARED TO DO ANYTHING TO END YOUR LIFE?: NO
1. IN THE PAST MONTH, HAVE YOU WISHED YOU WERE DEAD OR WISHED YOU COULD GO TO SLEEP AND NOT WAKE UP?: NO
2. HAVE YOU ACTUALLY HAD ANY THOUGHTS OF KILLING YOURSELF?: NO

## 2024-05-31 ASSESSMENT — PAIN DESCRIPTION - DESCRIPTORS: DESCRIPTORS: ACHING

## 2024-05-31 ASSESSMENT — PAIN SCALES - GENERAL
PAINLEVEL_OUTOF10: 8
PAINLEVEL_OUTOF10: 8

## 2024-05-31 ASSESSMENT — PAIN - FUNCTIONAL ASSESSMENT
PAIN_FUNCTIONAL_ASSESSMENT: 0-10

## 2024-05-31 NOTE — DISCHARGE INSTRUCTIONS
Bay Rios MD MS  [address] [facility]   [address]  [phone] (office)  [phone] (fax)    PLEASE READ CAREFULLY BEFORE CONTACTING YOUR PROVIDER.    WE WORK COLLABORATIVELY AS A TEAM. CALLING MULTIPLE STAFF MEMBERS REGARDING THE SAME ISSUE WILL DELAY YOUR CARE.  [employer] IS THE PREFERRED COMMUNICATION FOR ALL TEAM MEMBERS.    Postoperative Instructions: TOTAL HIP ARTHROPLASTY    JOINT CARE TEAM  Please use the information below to contact your care team following surgery.  If you are leaving a message, please include your full name, date of birth and date of surgery so that we can correctly identify you.  Your call will be returned within 1-2 business days, please do not leave multiple messages regarding a single issue while you are awaiting a return call.     Who to call Contact Information Matters needing handled   Bay Riso MD [facility]  [phone] Prescription Refills   Orders for dental antibiotics lifelong     Ese Saldivar MBA, BSN, RN-BC  CORTNEY Keating, RN  Ortho Program Navigators - CORTNEY Sy, RN  Ortho Coordinator Alberto SOUZAN-BC  Ortho Nurse Navigator   [phone] Nursing, medical question related questions or concerns within 6 weeks of surgery   Orders for Outpatient Physical Therapy                    [phone]     Scheduling office Visits  Medical questions/concerns  Leave of Absence or other paperwork  Any concerns more than 6 weeks from surgery - an appointment will need to be made     MEDICATION REFILLS - (MyChart or Main Office)    You will not receive a call indicating that your prescription has been filled.  Please contact your pharmacy with any questions.    Medication refills will be filled Monday-Friday 7am to 1pm ONLY. Please call the office or send a [employer] [facility] [employer] message for a refill request.  Any requests received outside of this timeframe will be handled on the next  business day.  The office staff and orthopedic nurses cannot refill medications; messages should be left directly through the office or via my chart.  Please do not call multiple times or call other members of the care team for medication needs, this will cause the refill to take longer.    Per State and Institutional policy, pain medications can only be refilled every 7 days for up to six weeks following surgery.    DRIVING & TRAVEL AFTER SURGERY   Patients should anticipate waiting at least 4-6 weeks before traveling long distances after surgery.  You will need to stop to walk around ever 1 hour during your travel to help with blood clot prevention.  Please call the office or your joint nurse to discuss prior to post-surgical travel.  Patients may not drive until cleared by the joint nurse or the office.    DENTAL PROCEDURES & CLEANINGS  You must wait a minimum of 3 months for elective dental appointments, including routine cleanings or dental work including bridges, crowns, extractions, etc..  For any dental visit - cleaning or dental procedures - patients must take an antibiotic 1 hour before the appointment.  Antibiotics are a lifelong need before dental appointments.  The antibiotic prescribed will be based on each patient's allergies.    WOUND CARE  Pain and swelling are normal following surgery and can last for weeks to months depending on the patient.  To help relieve these symptoms, please follow the post-operative pain regimen as it has been prescribed, use ice often, wear compression stockings every day as prescribed, and elevate your leg every hour.   Leaving the hospital, you have an ACE wrap in place. Two days after surgery, you can remove the ACE wrap and discard it. It does NOT need to be reapplied again.  You have a waterproof bandage on your wound and may shower with this on. The waterproof bandage is to remain in place for a 7 days. You or your home therapist should remove it at this time. You  may leave your incision open to air after the bandage has been removed.  You may shower 48 hours after surgery.  Do not scrub directly over or near the edges of your surgical bandage.  Soap and water may run freely over the site.  Under your waterproof bandage you have Steri-strips or a mesh covering in place. DO NOT peel them off. They will fall off on their own. You can continue to shower with these on. Let the water run freely over your incision when showering and do not scrub the incision or the surrounding area.   When drying the area around the incision, please use a SEPARATE towel that was not used on the rest of your body. The towel should be recently laundered but does not have to be cleaned a specific way. It should be laundered every 3 days. Pat the area dry. Do not rub the area around the incision. Steri strips will fall off in 1-2 weeks. If after 2 weeks they, have not, gently peel them off.  You may have clear stitches at the ends of your wound. Place Band-Aids on them for the first few weeks to prevent rubbing. You can gently tug on them after 2 weeks and they will come out or fall out on their own. DO NOT cut them. If they have not fallen out by you post-op visit, your doctor will remove them  If you have black stitches in place, your doctor will remove them in 2 weeks. These CANNOT get wet. If you have the silver waterproof bandage in place, you can shower. If the waterproof bandage is removed or not sticking, you CANNOT shower.  DO NOT soak your incision in a bath, hot tub, pool or pond/lake for a minimum of 12 weeks following your surgery.  DO NOT use lotions, creams, ointments on your wound for a minimum of 6 weeks following your surgery. At that time you may use vitamin E to assist with softening of your incision. Your physical therapist or doctor will talk to you about scar massage which can be started at 6 weeks.   You do NOT need to use the soap that was provided to you prior to surgery after  surgery. Use regular soap or shampoo.     PAIN, SWELLING, BRUISING & CLICKING  Pain and swelling are a natural part of your recovery which is considered normal fur up to a year after surgery.  Symptoms may be treated with movement, ice, compression stockings, elevating your leg, and by following the pain medication regimen as prescribed.  Bruising is normal for several weeks after surgery and will run down the leg over time.  You may ice areas that are tender to help with discomfort.  You are required to wear the provided compression stockings, every day, for 4 weeks following surgery.  Remove the stockings at night and place them back on in the morning.  Pain and swelling may temporarily increase with an increase in activity or exercise.  Use ice after activity.  Audible clicking with movement or exercises is considered normal following joint replacement.  You may also feel decreased sensation or numbness near the incision site.  These are usually normal and may or may not fade over time.     PERSONAL HYGIENE  You may shower upon discharging from the hospital.  Soap and water is permitted to run over the surgical dressing, steri-strips and incision.  Do not scrub directly over these items.  DO NOT soak your incision in a bath, hot tub, pool or pond/lake for a minimum of 12 weeks following your surgery.  DO NOT use lotions, creams, ointments on your wound for a minimum of 6 weeks following your surgery. At that time you may use vitamin E to assist with softening of your incision.      RESTARTING HOME ROUTINE - DIET & MEDICATIONS  Post-operative constipation can result due to a combination of inactivity, anesthesia and pain medication. To help prevent this, you should increase your water and fiber intake. Physical activity such as walking will also help stimulate the bowels.   You may resume your normal diet when you discharge home.  Choose foods that help promote good bowel habits and prevent constipation, such as  foods high in fiber.  You may restart your home medications the following day after your surgery UNLESS you have been given alternate instructions.  Follow the instructions given to you on your hospital discharge instructions for more information regarding your home medications.      IN-HOME PHYSICAL THERAPY & OUTPATIENT PHYSICAL THERAPY  Remember to get up and walk around your home every hour to 90 minutes to prevent blood clots and help with your recovery. This is an ACTIVE recovery.  In-home physical therapy will start 1-2 days after you get home from the hospital.    The home care agency will call within the first 24-48 hours to set up their first visit.  Please do not call your care team to inquire during this timeframe.  Continue the exercises you were given in the hospital until you have been seen by in-home therapy.  Make sure to provide a phone number with the ability for the home care staff to leave a message if you do not answer your phone.    Outpatient physical therapy following hip replacement surgery should begin 2-3 weeks after surgery if you and your physical therapist feel that you need it.  You should call to schedule this appointment ASAP if not already scheduled before surgery.  Waiting until you are ready for outpatient physical therapy will cause a delay in your care.  You may choose any outpatient physical therapy location.  Call the office for an order if needed.    EMERGENCIES - WHEN TO CONTACT THE SURGEON'S OFFICE IMMEDIATELY  Fever >101 with chills that has been present for at least 48 hours.   Excessive bleeding from incision that will not slow down. A small amount of drainage is normal and expected.  Once pressure is applied and the area is covered, do not continue to check the area regularly.  This will remove pressure and bleeding will continue.  Leave in place for 4-6 hours.  Signs of infection of incision-excessive drainage that is soaking through your dressing (especially if it is  pus-like), redness that is spreading out from the edges of your incision, or increased warmth around the area.  Excruciating pain for which the pain medication, taken as instructed, is not helping.  Severe calf pain.  Go directly to the emergency room or call 911, if you are experiencing chest pain or difficulty breathing.    ICE & COLD THERAPY INSTRUCTIONS    To assist with pain control and post-op swelling, you should be using ice regularly throughout recovery, especially for the first 6 weeks, regardless of the cold therapy method you use.      Always make sure there is a layer of protection between the cold pad and your skin.    If you are using ICE PACKS or GEL PACKS, you will need to alternate 20 minutes on, 20 minutes off twice per hour.    If you are using an ICE MACHINE, please follow the provided ice machine instructions.  These devices differ from ice or ice packs whereas the mechanism circulates water through tubing and a pad to provide longer periods of cold therapy to the desired site.  You can use your cold devices around the clock for optimal comfort.  We recommend using cold therapy after working with therapy or completing exercises on your own.  There is no set schedule in which you must follow while using cold therapy.  Below are a few points to remember when using a cold therapy device:    You do not need to need to use the 20 on, 20 off method.  Detach the pad from the cooler and ambulate at least once every hour.  You can check your skin under the pad at this time.  You may wear the cold therapy device during periods of sleep including overnight.  If you wake up during the night, you can check the skin at this time.  You do not need to wake up specifically to perform skin checks.  Empty the cooler and pad when device is not in use.  Follow 's instructions for cleaning your cold therapy device.      DISCHARGE MEDICATIONS - Please reference the sample schedule for instructions on how  to best schedule medications.  PAIN MEDICATION    ___X_ Tramadol / Oxycodone  Tramadol and Oxycodone have been prescribed for post-operative pain control.    These medications will only be refilled ONCE every 7 days for a period of up to 6 weeks following surgery.  After 6 weeks, you will transition to acetaminophen and over -the- counter anti-inflammatories such as Ibuprofen, Advil or Aleve in conjunction with ICE/COLD THERAPY.   Side effects may be constipation and nausea, vomiting, sleepiness, dizziness, lightheadedness, headache, blurred vision, dry mouth sweating, itching (if you have itching, over-the -counter Benadryl can be used as needed).  You may NOT operate a motor vehicle while taking these medications or have been cleared by your care team.     ___X_ Acetaminophen (Tylenol)  Acetaminophen has been prescribed as an adjunct for pain control. Take two 500 mg tablets every 8 hours for 4 weeks. You will not receive a refill on this medication.  Do not exceed 4000mg of acetaminophen within a 24 hour period.  Side effects may include nausea, heartburn, drowsiness, and headache.    _______ Meloxicam (Mobic)-Meloxicam has been prescribed as an adjunct anti-inflammatory to assist in pain control.    Take one 15mg tablet once daily for 4 weeks.  You will not receive refills on this medication.   Side effects may include nausea.  May not be prescribed if you are on a more potent blood thinner than aspirin or have chronic kidney disease.    BLOOD THINNER    ___X_ Blood Thinner   Aspirin, eliquis or xarelto has been prescribed as a blood thinner to prevent blood clots in your leg or lungs. Take as prescribed on the bottle for 4 weeks. You will not receive a refill on this medication.  Do not take this medication if you are on another blood thinner.  Do not take any additional anti-inflammatory medications while taking Aspirin or another blood thinning medication.  Examples may include, Celebrex, Ibuprofen, Motrin,  Aleve, or Advil.     ANTI NAUSEA    ___X_ Pantoprazole (Protonix)  Pantoprazole has been prescribed to help with nausea and protect your stomach while taking pain medication. Take one 40 mg tablet once daily for 4 weeks. You will not receive a refill on this medication.    ___X_ Zofran (Ondansetron)  Zofran has been prescribed to help with nausea and protect your stomach while taking pain medication. Take one 4 mg tablet every eight hours as needed for nausea. Refills will be provided as necessary.      STOOL SOFTENERS    ___X_ Colace (Docusate Sodium) and Senna (Sennoside)  Post-operative constipation can result due to a combination of inactivity, anesthesia and pain medication. To help prevent this, you should increase your water and fiber intake. Physical activity such as walking will also help stimulate the bowels.   Colace has been prescribed to help with constipation while on Oxycodone and Tramadol. Take one 100 mg tablet twice daily for 4 weeks. No refills needed.  Senna has been prescribed in combination with Colace to help with constipation while taking your pain medications.  Take one 8.6mg Tablet once daily.      ANTIBIOTICS    ___X_ Cefadroxil or Doxycycline   Post-operative prevention of infection   Side effects can be upset stomach or diarrhea  Take with food. Do not take on an empty stomach  May not be prescribed     You will not receive refills on the following medications:  Acetaminophen (Tylenol)  Senna  Colace  Pantoprazole  Blood Thinner (Aspirin or Eliquis)  Antibiotic (Cefadroxil or Doxycycline)  Pain Medication Refills -[phone] or [facility]- Monday through Friday 7am-1pm    Medication refills will be sent upon receipt of your request during the times listed above. Due to the high call volume, you will not receive a call confirming prescription refills; please do not call multiple times.  Prescription refills may take a few hours to process, you may follow up with your pharmacy for pickup  availability.    SAMPLE              The times below are an example of how to organize medications to optimize pain control  Your actual medication schedule may vary based on your last dose taken IN THE HOSPITAL      Time 3:00am 6:00am 9:00am 12:00pm 3:00pm 6:00pm 9:00pm 12:00am   Medications Tramadol Acetaminophen (Tylenol)   Oxycodone  Senna   Blood Thinner  Colace  Pantoprazole  Tramadol  Antibiotic  Oxycodone Tramadol  Acetaminophen (Tylenol) Oxycodone     Blood Thinner  Colace  Tramadol  Antibiotic   Acetaminophen (Tylenol)   Oxycodone            You may begin to wean off the pain medication as your pain remains controlled with increased activity.  The schedules provided are meant to serve as an example.  You may wean off based on your pain control.  Please note that pain medications are not filled beyond 6 weeks after surgery.              The times below are an example of how to WEAN OFF medications WHILE CONTINUING TO OPTIMIZE PAIN CONTROL.  Your actual medication schedule may vary based on your last dose taken.  Time 12:00am 4:00am 8:00am 12:00pm 4:00pm 8:00pm   Med Tramadol Oxycodone   Tramadol Oxycodone Tramadol Oxycodone     Time 12:00am 6:00am 12:00pm 6:00pm   Med Tramadol Oxycodone   Tramadol Oxycodone     Time 12:00am 8:00am 4:00pm   Med Tramadol Oxycodone   Tramadol     Time 12:00am 12:00pm   Med Tramadol Tramadol

## 2024-06-01 NOTE — SIGNIFICANT EVENT
Please excuse the late entry of this note.    The patient was brought into the hospital for an elective right total hip arthroplasty, conversion from previous open surgery.  Previously, his surgery was canceled due to a positive urine toxicology screening for cocaine.  I spoke with the patient in the office about her drug use.  I informed her that it would be unsafe to move forward with surgery if she is actively using cocaine.  She assured me that she would not be using cocaine any further.  We did perform a urine toxicology screening 1 week prior to surgery.  This was negative.  On the date of surgery, the patient was brought in and per our collective decision making between myself and our anesthesia team, we decided to retest the patient on the day of surgery.  The patient was informed of this prior to the operation as this was part of our plan going into this operation.  The patient tested positive for cocaine.  We repeated the urine toxicology and it was again positive.  I spoke with the lab who informed me that confirmatory testing would require the specimen to be shipped downtown.  Given the fact that she was positive on 2 consecutive urine toxicology test, I elected to cancel the surgery.  The patient was distraught and obviously very frustrated with my decision.  However, this is a complex operation with significant potential morbidity should she fail to heed my instructions.  Accordingly, the fact that she has tested positive for cocaine on multiple occasions leads me to be concerned that she is a chronic user.  Unfortunately, the patient has significant posttraumatic osteoarthritis.  I will work with her anesthesia team to find a pathway to potentially get her surgery done.    *This note was created using voice recognition software and was not corrected for typographical or grammatical errors.*

## 2024-06-25 ENCOUNTER — APPOINTMENT (OUTPATIENT)
Dept: ORTHOPEDIC SURGERY | Facility: CLINIC | Age: 29
End: 2024-06-25
Payer: MEDICAID

## 2024-07-08 DIAGNOSIS — M16.11 PRIMARY OSTEOARTHRITIS OF RIGHT HIP: ICD-10-CM

## 2024-07-08 DIAGNOSIS — M16.11 PRIMARY LOCALIZED OSTEOARTHRITIS OF RIGHT HIP: ICD-10-CM

## 2024-08-27 ENCOUNTER — APPOINTMENT (OUTPATIENT)
Dept: ORTHOPEDIC SURGERY | Facility: CLINIC | Age: 29
End: 2024-08-27
Payer: MEDICAID

## 2024-09-18 ENCOUNTER — PATIENT MESSAGE (OUTPATIENT)
Dept: ORTHOPEDIC SURGERY | Facility: CLINIC | Age: 29
End: 2024-09-18
Payer: MEDICAID

## 2024-09-24 ENCOUNTER — APPOINTMENT (OUTPATIENT)
Dept: ORTHOPEDIC SURGERY | Facility: CLINIC | Age: 29
End: 2024-09-24
Payer: MEDICAID

## 2024-10-08 ENCOUNTER — APPOINTMENT (OUTPATIENT)
Dept: RADIOLOGY | Facility: CLINIC | Age: 29
End: 2024-10-08
Payer: MEDICAID

## 2024-10-08 ENCOUNTER — APPOINTMENT (OUTPATIENT)
Dept: ORTHOPEDIC SURGERY | Facility: CLINIC | Age: 29
End: 2024-10-08
Payer: MEDICAID

## 2024-10-08 DIAGNOSIS — M16.11 PRIMARY LOCALIZED OSTEOARTHRITIS OF RIGHT HIP: ICD-10-CM

## 2024-10-15 ENCOUNTER — OFFICE VISIT (OUTPATIENT)
Dept: ORTHOPEDIC SURGERY | Facility: CLINIC | Age: 29
End: 2024-10-15
Payer: MEDICAID

## 2024-10-15 ENCOUNTER — HOSPITAL ENCOUNTER (OUTPATIENT)
Dept: RADIOLOGY | Facility: CLINIC | Age: 29
Discharge: HOME | End: 2024-10-15
Payer: MEDICAID

## 2024-10-15 DIAGNOSIS — M16.11 PRIMARY LOCALIZED OSTEOARTHRITIS OF RIGHT HIP: ICD-10-CM

## 2024-10-15 DIAGNOSIS — M15.0 PRIMARY OSTEOARTHRITIS INVOLVING MULTIPLE JOINTS: ICD-10-CM

## 2024-10-15 PROCEDURE — 99214 OFFICE O/P EST MOD 30 MIN: CPT | Performed by: STUDENT IN AN ORGANIZED HEALTH CARE EDUCATION/TRAINING PROGRAM

## 2024-10-15 PROCEDURE — 73502 X-RAY EXAM HIP UNI 2-3 VIEWS: CPT | Mod: RIGHT SIDE | Performed by: RADIOLOGY

## 2024-10-15 PROCEDURE — 1036F TOBACCO NON-USER: CPT | Performed by: STUDENT IN AN ORGANIZED HEALTH CARE EDUCATION/TRAINING PROGRAM

## 2024-10-15 PROCEDURE — 73502 X-RAY EXAM HIP UNI 2-3 VIEWS: CPT | Mod: RT

## 2024-10-15 NOTE — PROGRESS NOTES
Chief complaint: Right hip pain    HPI: Fauzia Patel is a pleasant 29 y.o. year-old female who is seen today for preoperative consultation prior to upcoming right total hip arthroplasty.  The patient does have a history of right femoral neck fracture due to a benign cyst that was treated with curettage, which was treated with bone grafting as well as internal fixation.  This was done at [facility] in 2011.  The reduction was done via Suh-Abarca approach and the screws were placed through small incisions on the lateral aspect of the hip. She was previously scheduled for a right MESSI on 4/15/24 which has had to be rescheduled due to a positive drug screen (cocaine +). She is still interested in scheduling surgery and denies interval cocaine use.    Fauzia Patel continues to complain of disabling pain in the right hip.  Her quality life is negatively impacted.  Fauzia finds it difficult to work due to the pain although she has been working since we last canceled her surgery.  She is frustrated with the level of pain.  She is also frustrated with her lack of mobility and limited range of motion.  She has not had any recent fall or trauma.    There has been no interval change in this patient's past medical, surgical, medications, allergies, family history or social history since the most recent visit to a provider within our department.  14 point review of systems was performed, reviewed, and negative except for pertinent positives documented in the history of present illness.    Past Medical History:   Diagnosis Date    Anxiety     Depression     PTSD (post-traumatic stress disorder)        Past Surgical History:   Procedure Laterality Date    CT GUIDED PERCUTANEOUS PERITONEAL OR RETROPERITONEAL FLUID COLLECTION DRAINAGE  02/13/2023    CT GUIDED PERCUTANEOUS PERITONEAL OR RETROPERITONEAL FLUID COLLECTION DRAINAGE [facility]    HYSTEROSCOPY       Social History     Socioeconomic History    Marital status:  Single   Tobacco Use    Smoking status: Never     Passive exposure: Never    Smokeless tobacco: Never   Vaping Use    Vaping status: Every Day    Substances: Nicotine    Devices: Disposable   Substance and Sexual Activity    Alcohol use: Not Currently    Drug use: Yes     Types: Marijuana    Sexual activity: Defer     Social Determinants of Health     Financial Resource Strain: Not on File (2/10/2022)    Received from [facility]    Financial Resource Strain     Financial Resource Strain: 0   Recent Concern: Financial Resource Strain - At Risk (2/10/2022)    Received from [facility]    Financial Resource Strain     Financial Resource Strain: 2   Food Insecurity: Not on File (2/10/2022)    Received from [facility]    Food Insecurity     Food: 0   Recent Concern: Food Insecurity - At Risk (2/10/2022)    Received from [facility]    Food Insecurity     Food: 2   Transportation Needs: Not on File (2/10/2022)    Received from [facility]    Transportation Needs     Transportation: 0   Recent Concern: Transportation Needs - At Risk (2/10/2022)    Received from [facility]    Transportation Needs     Transportation: 2   Physical Activity: Not on File (2021)    Received from [facility]    Physical Activity     Physical Activity: 0   Stress: Not on File (2/10/2022)    Received from [facility]    Stress     Stress: 0   Recent Concern: Stress - At Risk (2/10/2022)    Received from [facility]    Stress     Stress: 2   Social Connections: Not on File (2/10/2022)    Received from [facility]    Social Connections     Connectedness: 0   Housing Stability: Not on File (2/10/2022)    Received from [facility]    Housing Stability     Housin   Recent Concern: Housing Stability - At Risk (2/10/2022)    Received from [facility]    Housing Stability     Housin     No Known Allergies      General: Well-appearing female in no acute distress.  Awake, alert and oriented.  Pleasant and cooperative.  Respiratory: Non-labored breathing  Mood: Euthymic   Gait: Antalgic  Assistive Device:  None     Limb Length Discrepancy: 3-5 mm    Affected Right Hip  Range of motion:   Flexion: 70  Extension: 0  Internal Rotation: 5  External Rotation: 10  Abduction: 15  Hip Flexor Strength: 5/5  Abductor Strength: 5/5  Adductor Strength: 5/5  Tenderness: With hip thrust  Sensation: Intact to light touch distally  Motor function: Able to fire TA, EHL, G/S  Pulses: Palpable DP pulse    Imaging:   AP pelvis, AP hip and false profile views: Independent review of right hip and pelvis x-rays was performed. The findings were reviewed with the patient. There are severe degenerative changes of the right hip with with evidence of prior screw fixation with associated joint space narrowing, subchondral sclerosis, and osteophyte formation. No evidence of fracture, AVN, dislocation, osteomyelitis.    She through lateral demonstrates about 10 to 15 degrees of anteversion of the femoral neck relative to the shaft    Assessment/Plan:    Fauzia Patel for more than six months has had limited function as well as persistent and severe pain which has negatively impacted the quality of life and interfered with activities of daily living. Under my care or the care of other providers, for greater than the three months, conservative treatment including activity modification, over the counter pain medications, physical therapy and/or recommended home exercise program, have provided only minimal relief. The patient has not had an intra-articular injection in the past 3 months. The option to continue with conservative measures in lieu of arthroplasty was discussed and offered. However, given the failure of these conservative measures and the clinical and radiographic evidence of end-stage arthritis, the patient is a good candidate for an elective total hip arthroplasty. The stated potential benefits include pain relief, a feeling of improved joint motion and improved function were discussed but no guarantees were offered.    I talked with  the patient at length about risks, limitations, benefits and alternatives to total hip replacement today. I reviewed risks and concerns including but not limited to implant wear, loosening, implant failure, infection, need for revision surgery, delayed wound healing, deep vein thrombosis, pulmonary embolism, stroke, other cardiopulmonary event, nerve or vascular injury, death and other medical and anesthetic complications of surgery. We talked about the potential for persistent pain following surgery since there are many possible causes for hip and leg pain. The patient was advised that hip replacement will only relieve pain that is coming from the hip. We talked about leg length discrepancy that may necessitate the use of a shoe lift, neurovascular problems such as foot drop and dislocation after surgery. The patient understands that we may have to lengthen the leg slightly to provide for adequate stability of the hip. I reviewed dislocation precautions and activity restrictions in detail. We discussed the concerns about intraoperative fracture, ingrowth failure, thigh pain and possible post-operative weight bearing restrictions following cementless hip replacement.  We discussed the possible need for a blood transfusion. We discussed the fact that many of our patients are able to go home in 1 day or the same day depending on their health, mobility, pre-op preparation, individual home situation and personal preference. The patient should take our pre-operative teaching class. All of the patients questions were answered. The patient can call my office to schedule surgery and the pre-op teaching class. I told the patient that they should contact their primary care physician to discuss fitness for surgery. The patient was also encouraged to get dental clearance prior to surgery.     The patient acknowledged a clear understanding of these issues and expressed the desire to proceed with surgery once medical clearance  has been obtained.    The patient has identified their personal goals of their joint replacement surgery and recovery and we have discussed them. These are documented in our [employer] patient engagement platform. In addition, we have discussed the advantages and disadvantages of various implant and fixation options, as well as various surgical approaches. The basic concepts of the joint replacement procedure has been reviewed with the patient and the patient has been provided the opportunity to see an actual implant either in the office or in our pre-op education class.    I also discussed with the patient the risks of being in the hospital environment in the setting of COVID-19. This risk was considered in light of the overall risk and benefit discussion related to the surgery. The patient's symptoms are severe and worsening, and cause an inability to perform activities of daily living. All possible precautions will be taken and length of stay will be limited as much as possible. The patient is fully aware of this after complete discussion and would like to proceed.    The patient has the following comorbidities that increase the risk of infection following joint replacement surgery: Previous open surgery, retained hardware, illicit substance abuse. This was explicitly discussed with the patient and they would like to proceed with surgery.     Fauzia understands that she is at risk of cardiac event if she uses illicit substances such as cocaine prior to surgery.  She is adamant that she has not been using any illicit substances.  Today, Fauzia seems very motivated to move forward with the surgery.    I previously discussed her case with our anesthesia team.  Given her young age and absence of cardiac comorbidities, we will proceed with surgery regardless of the results of the urine drug screen.  However, we will obtain a urine drug screen for completeness sake.  Patient is aware of the risks.    Surgical plan:  Right total hip arthroplasty, conversion   Implants: Depuy Emphasys and monoblock reclaim  Special equipment: Synthes and [place] large screw removal systems   DVT prophylaxis:  Aspirin 81 mg BID for 4 weeks   Drugs to stop: none  Allergies to antibiotics: none  Antibiotic Plan: Ancef (+/- vancomycin pending MRSA screen)  Special clearance needed: No  Candidate for Outpatient: No  Meds-to-beds: Not d/w patient   Pain medication post op: Standard: Oxycodone, Tramadol and Tylenol   DME Recommendations: Hip Kit, Raised Toilet Seat if toilet seats at home are low,  Walker or Crutches depending on patient´s preference, Thigh high compression stocking. OPTIONAL: [employer]     * This office note was dictated using Dragon voice to text software and was not proofread for spelling or grammatical errors *

## 2024-11-01 ENCOUNTER — TELEPHONE (OUTPATIENT)
Dept: PREADMISSION TESTING | Facility: HOSPITAL | Age: 29
End: 2024-11-01
Payer: MEDICAID

## 2024-11-08 ENCOUNTER — APPOINTMENT (OUTPATIENT)
Dept: PREADMISSION TESTING | Facility: HOSPITAL | Age: 29
End: 2024-11-08
Payer: MEDICAID

## 2024-11-11 ENCOUNTER — TELEPHONE (OUTPATIENT)
Dept: ORTHOPEDIC SURGERY | Facility: HOSPITAL | Age: 29
End: 2024-11-11
Payer: MEDICAID

## 2024-11-12 ENCOUNTER — TELEPHONE (OUTPATIENT)
Dept: ORTHOPEDIC SURGERY | Facility: HOSPITAL | Age: 29
End: 2024-11-12
Payer: MEDICAID

## 2024-11-12 NOTE — TELEPHONE ENCOUNTER
Called patient to discuss upcoming surgery. Confirmed that the plan is for an overnight hospital stay. The patient has obtained their medical equipment. The patient does have a care partner to assist them at home postoperatively. They live in a house.  The patient does not have stairs required for navigating the home. The patient currently uses a cane for ambulation. Reminded patient to follow PAT instructions leading up to surgery and to watch for a phone call from preop the day prior to their surgery to receive details about arrival time. All questions answered at this time.

## 2024-11-14 ENCOUNTER — PRE-ADMISSION TESTING (OUTPATIENT)
Dept: PREADMISSION TESTING | Facility: HOSPITAL | Age: 29
End: 2024-11-14
Payer: MEDICAID

## 2024-11-14 ENCOUNTER — LAB (OUTPATIENT)
Dept: LAB | Facility: LAB | Age: 29
End: 2024-11-14
Payer: MEDICAID

## 2024-11-14 VITALS
WEIGHT: 227.07 LBS | HEART RATE: 88 BPM | BODY MASS INDEX: 34.41 KG/M2 | OXYGEN SATURATION: 100 % | DIASTOLIC BLOOD PRESSURE: 72 MMHG | SYSTOLIC BLOOD PRESSURE: 130 MMHG | RESPIRATION RATE: 16 BRPM | TEMPERATURE: 98.5 F | HEIGHT: 68 IN

## 2024-11-14 DIAGNOSIS — Z01.818 PREPROCEDURAL EXAMINATION: ICD-10-CM

## 2024-11-14 DIAGNOSIS — F19.90 DRUG USE: ICD-10-CM

## 2024-11-14 DIAGNOSIS — N92.6 IRREGULAR BLEEDING: Primary | ICD-10-CM

## 2024-11-14 DIAGNOSIS — N92.6 IRREGULAR BLEEDING: ICD-10-CM

## 2024-11-14 DIAGNOSIS — M16.11 PRIMARY OSTEOARTHRITIS OF RIGHT HIP: ICD-10-CM

## 2024-11-14 LAB
ABO GROUP (TYPE) IN BLOOD: NORMAL
ALBUMIN SERPL BCP-MCNC: 4.2 G/DL (ref 3.4–5)
ALP SERPL-CCNC: 135 U/L (ref 33–110)
ALT SERPL W P-5'-P-CCNC: 71 U/L (ref 7–45)
AMPHETAMINES UR QL SCN: NORMAL
ANION GAP SERPL CALC-SCNC: 14 MMOL/L (ref 10–20)
ANTIBODY SCREEN: NORMAL
AST SERPL W P-5'-P-CCNC: 41 U/L (ref 9–39)
BARBITURATES UR QL SCN: NORMAL
BASOPHILS # BLD AUTO: 0.02 X10*3/UL (ref 0–0.1)
BASOPHILS NFR BLD AUTO: 0.2 %
BENZODIAZ UR QL SCN: NORMAL
BILIRUB SERPL-MCNC: 0.8 MG/DL (ref 0–1.2)
BUN SERPL-MCNC: 11 MG/DL (ref 6–23)
BZE UR QL SCN: NORMAL
CALCIUM SERPL-MCNC: 9.1 MG/DL (ref 8.6–10.3)
CANNABINOIDS UR QL SCN: NORMAL
CHLORIDE SERPL-SCNC: 101 MMOL/L (ref 98–107)
CO2 SERPL-SCNC: 25 MMOL/L (ref 21–32)
CREAT SERPL-MCNC: 0.75 MG/DL (ref 0.5–1.05)
EGFRCR SERPLBLD CKD-EPI 2021: >90 ML/MIN/1.73M*2
EOSINOPHIL # BLD AUTO: 0.2 X10*3/UL (ref 0–0.7)
EOSINOPHIL NFR BLD AUTO: 2.5 %
ERYTHROCYTE [DISTWIDTH] IN BLOOD BY AUTOMATED COUNT: 12.2 % (ref 11.5–14.5)
EST. AVERAGE GLUCOSE BLD GHB EST-MCNC: 108 MG/DL
FENTANYL+NORFENTANYL UR QL SCN: NORMAL
GLUCOSE SERPL-MCNC: 87 MG/DL (ref 74–99)
HBA1C MFR BLD: 5.4 %
HCT VFR BLD AUTO: 44.3 % (ref 36–46)
HGB BLD-MCNC: 14.5 G/DL (ref 12–16)
IMM GRANULOCYTES # BLD AUTO: 0.03 X10*3/UL (ref 0–0.7)
IMM GRANULOCYTES NFR BLD AUTO: 0.4 % (ref 0–0.9)
LYMPHOCYTES # BLD AUTO: 2.3 X10*3/UL (ref 1.2–4.8)
LYMPHOCYTES NFR BLD AUTO: 28.5 %
MCH RBC QN AUTO: 28.2 PG (ref 26–34)
MCHC RBC AUTO-ENTMCNC: 32.7 G/DL (ref 32–36)
MCV RBC AUTO: 86 FL (ref 80–100)
METHADONE UR QL SCN: NORMAL
MONOCYTES # BLD AUTO: 1.07 X10*3/UL (ref 0.1–1)
MONOCYTES NFR BLD AUTO: 13.3 %
NEUTROPHILS # BLD AUTO: 4.44 X10*3/UL (ref 1.2–7.7)
NEUTROPHILS NFR BLD AUTO: 55.1 %
NRBC BLD-RTO: 0 /100 WBCS (ref 0–0)
OPIATES UR QL SCN: NORMAL
OXYCODONE+OXYMORPHONE UR QL SCN: NORMAL
PCP UR QL SCN: NORMAL
PLATELET # BLD AUTO: 424 X10*3/UL (ref 150–450)
POTASSIUM SERPL-SCNC: 4.1 MMOL/L (ref 3.5–5.3)
PROT SERPL-MCNC: 8 G/DL (ref 6.4–8.2)
RBC # BLD AUTO: 5.14 X10*6/UL (ref 4–5.2)
RH FACTOR (ANTIGEN D): NORMAL
SODIUM SERPL-SCNC: 136 MMOL/L (ref 136–145)
WBC # BLD AUTO: 8.1 X10*3/UL (ref 4.4–11.3)

## 2024-11-14 PROCEDURE — 87081 CULTURE SCREEN ONLY: CPT | Mod: AHULAB | Performed by: NURSE PRACTITIONER

## 2024-11-14 PROCEDURE — 85025 COMPLETE CBC W/AUTO DIFF WBC: CPT

## 2024-11-14 PROCEDURE — 80053 COMPREHEN METABOLIC PANEL: CPT

## 2024-11-14 PROCEDURE — 93010 ELECTROCARDIOGRAM REPORT: CPT | Performed by: INTERNAL MEDICINE

## 2024-11-14 PROCEDURE — 80307 DRUG TEST PRSMV CHEM ANLYZR: CPT

## 2024-11-14 PROCEDURE — 99214 OFFICE O/P EST MOD 30 MIN: CPT | Performed by: NURSE PRACTITIONER

## 2024-11-14 PROCEDURE — 83036 HEMOGLOBIN GLYCOSYLATED A1C: CPT

## 2024-11-14 PROCEDURE — 86850 RBC ANTIBODY SCREEN: CPT

## 2024-11-14 PROCEDURE — 86901 BLOOD TYPING SEROLOGIC RH(D): CPT

## 2024-11-14 PROCEDURE — 86900 BLOOD TYPING SEROLOGIC ABO: CPT

## 2024-11-14 PROCEDURE — 36415 COLL VENOUS BLD VENIPUNCTURE: CPT

## 2024-11-14 RX ORDER — CHLORHEXIDINE GLUCONATE ORAL RINSE 1.2 MG/ML
15 SOLUTION DENTAL DAILY
Qty: 30 ML | Refills: 0 | Status: SHIPPED | OUTPATIENT
Start: 2024-11-14 | End: 2024-11-19 | Stop reason: HOSPADM

## 2024-11-14 RX ORDER — DEXTROMETHORPHAN HYDROBROMIDE, GUAIFENESIN 5; 100 MG/5ML; MG/5ML
650 LIQUID ORAL EVERY 8 HOURS PRN
COMMUNITY
End: 2024-11-19 | Stop reason: HOSPADM

## 2024-11-14 ASSESSMENT — ENCOUNTER SYMPTOMS
COUGH: 1
CARDIOVASCULAR NEGATIVE: 1
NECK NEGATIVE: 1
CONSTITUTIONAL NEGATIVE: 1
GASTROINTESTINAL NEGATIVE: 1
SINUS CONGESTION: 1

## 2024-11-14 NOTE — CPM/PAT H&P
CPM/PAT Evaluation       Name: Fauzia Patel (Fauzia Patel)  /Age: 1995/29 y.o.     SURGEON :DR NAOMI SAENZ   Surgery, Date, and Length:  Right Hip Total Arthroplasty ~ Posterior Approach 24    HPI:  This a 29 y.o. fe-male who presents for presurgical evaluation for for above mentioned procedure   . Pt has a history of chronic pain . She did have a percutaneous screw fixation of same hip .After discussion of the risks and benefits with Dr WENDY SAENZ . the patient elects to proceed with the planned procedure.       Past Medical History:   Diagnosis Date    Anxiety     Depression     PTSD (post-traumatic stress disorder)        Past Surgical History:   Procedure Laterality Date    CT GUIDED PERCUTANEOUS PERITONEAL OR RETROPERITONEAL FLUID COLLECTION DRAINAGE  2023    CT GUIDED PERCUTANEOUS PERITONEAL OR RETROPERITONEAL FLUID COLLECTION DRAINAGE [facility]    HYSTEROSCOPY         Anesthesia History  Pt denies any past history of anesthetic complications such as PONV, awareness, prolonged sedation, dental damage, aspiration, cardiac arrest, difficult intubation, difficult I.V. access or unexpected hospital admissions.  NO malignant hyperthermia and or pseudo cholinesterase deficiency.    The patient is not  a Confucianism and will accept blood and blood products if medically indicated.   No history of blood transfusions .Type and screen  sent.     Social History  Social History     Substance and Sexual Activity   Drug Use Yes    Types: Marijuana    Pt instructed to stop 7 days prior   Social History     Substance and Sexual Activity   Alcohol Use Not Currently      Social History     Tobacco Use   Smoking Status Never    Passive exposure: Never   Smokeless Tobacco Never          No family history on file.    No Known Allergies    Prior to Admission medications    Medication Sig Start Date End Date Taking? Authorizing Provider   acetaminophen (Tylenol 8 HOUR) 650 mg ER tablet  Take 1 tablet (650 mg) by mouth every 8 hours if needed for mild pain (1 - 3). Do not crush, chew, or split.   Yes Historical Provider, MD   chlorhexidine (Peridex) 0.12 % solution Use 15 mL in the mouth or throat once daily for 2 days. 11/14/24 11/16/24  Nisha K Zaynab APRN-CNP   pantoprazole (ProtoNix) 40 mg EC tablet Take 1 tablet (40 mg) by mouth once daily in the morning. Take before meals. Do not crush, chew, or split.  Patient not taking: Reported on 11/14/2024 5/30/24 6/29/24  Bay Rios MD   ondansetron (Zofran) 4 mg tablet Take 1 tablet (4 mg) by mouth every 8 hours if needed for nausea or vomiting. 5/30/24 11/14/24  Bay Rios MD        PAT ROS:   Constitutional:   neg    Neuro/Psych:   Eyes:   Ears:   Nose:    sinus congestion  Mouth:   neg    Throat:   neg    Neck:   neg    Cardio:   neg    Respiratory:    cough  Endocrine:   GI:   neg    :   neg    Musculoskeletal:   Hematologic:   neg    Skin:  neg        Physical Exam  Vitals reviewed.   Constitutional:       Appearance: Normal appearance.   HENT:      Head: Normocephalic.      Mouth/Throat:      Mouth: Mucous membranes are moist.   Eyes:      Extraocular Movements: Extraocular movements intact.      Pupils: Pupils are equal, round, and reactive to light.   Cardiovascular:      Rate and Rhythm: Normal rate and regular rhythm.      Pulses: Normal pulses.      Heart sounds: Normal heart sounds.   Pulmonary:      Effort: Pulmonary effort is normal.      Breath sounds: Normal breath sounds.   Musculoskeletal:         General: Tenderness present.      Cervical back: Normal range of motion.   Skin:     General: Skin is warm and dry.   Neurological:      Mental Status: She is alert and oriented to person, place, and time.   Psychiatric:         Behavior: Behavior normal.          PAT AIRWAY:   Airway:     Mallampati::  I  normal        Testing/Diagnostic:     EKG in [facility]     Lab Results   Component Value Date    WBC 8.1 11/14/2024  "   HGB 14.5 11/14/2024    HCT 44.3 11/14/2024    MCV 86 11/14/2024     11/14/2024     Results from last 7 days   Lab Units 11/14/24  1628   SODIUM mmol/L 136   POTASSIUM mmol/L 4.1   CHLORIDE mmol/L 101   CO2 mmol/L 25   BUN mg/dL 11   CREATININE mg/dL 0.75   CALCIUM mg/dL 9.1   PROTEIN TOTAL g/dL 8.0   BILIRUBIN TOTAL mg/dL 0.8   ALK PHOS U/L 135*   ALT U/L 71*   AST U/L 41*   GLUCOSE mg/dL 87       Lab Results   Component Value Date    HGBA1C 5.4 11/14/2024     UTOX NEGATIVE       MRSA PENDING     Patient Specialist/PCP:   /72   Pulse 88   Temp 36.9 °C (98.5 °F)   Resp 16   Ht 1.727 m (5' 8\")   Wt 103 kg (227 lb 1.2 oz)   SpO2 100%   BMI 34.53 kg/m²       ASSESSMENT/PLAN    Patient is a 29 year-old  scheduled for Right Hip Total Arthroplasty ~ Posterior Approach  with Dr. Shahbaz mullins   on  11/18/24 .  CARDIOVASCULAR:  RCRI score / Risk: The patients score is 0 based on history . Per ACC/AHA guidelines this places her  at  3.9% risk for MACE undergoing a intermediate  risk procedure . The patient has the following risk factors: denies   Functional Capacity: The patients exercise tolerance is  4  METS. This is based on the patient's limited only due to hipm pain . Patient denies  active cardiac symptoms or anginal equivalents .      PULMONARY:  The patient has the following factors that place them at increased risk of perioperative pulmonary complications;age greater than 65/BMI greater than 27/smoker /snores /greater than 2.0 hour procedure.  Postoperatively the patient would benefit from early pulmonary toilet/incentive spirometry q 1-2 hours while awake/pulse oximetry/cautious use of respiratory depressant medications such as opioids/elevate the HOB/oral hygiene.    HX COCAINE USE :  Pt has had 2 prior surgeries cancelled due to positive cocaine utox.  UTOX ordered with today's labs .    DVT:  CAPRINI SCORE=6  The patient has the following factors that increase her  Risk for thrombus formation " ; Virchow's triad , bmi>25, TJR, Surgical procedure >2 hrs  procedure .    Recommendations: DVT prophylaxis  per Dr. Shahbaz mullins protocol . SCD's, ROLANDA's, and early ambulation are recommended. Heparin or LMWH is recommended for the very high risk .      Risk assessment complete.  Patient is scheduled for  intermediate  surgical risk procedure.  Patient is considered an increased risk pending drug screen  risk to proceed with the planned procedure.      Preoperative medication instructions were provided and reviewed with the patient.  Any additional testing or evaluation was explained to the patient.  Nothing by mouth instructions were discussed and patient's questions were answered prior to conclusion to this encounter.  Patient verbalized understanding of preoperative instructions given in preadmission testing; discharge instructions available in EMR.

## 2024-11-14 NOTE — H&P (VIEW-ONLY)
CPM/PAT Evaluation       Name: Fauzia Patel (Fauzia Patel)  /Age: 1995/29 y.o.     SURGEON :DR NAOMI SAENZ   Surgery, Date, and Length:  Right Hip Total Arthroplasty ~ Posterior Approach 24    HPI:  This a 29 y.o. fe-male who presents for presurgical evaluation for for above mentioned procedure   . Pt has a history of chronic pain . She did have a percutaneous screw fixation of same hip .After discussion of the risks and benefits with Dr WENDY SAENZ . the patient elects to proceed with the planned procedure.       Past Medical History:   Diagnosis Date    Anxiety     Depression     PTSD (post-traumatic stress disorder)        Past Surgical History:   Procedure Laterality Date    CT GUIDED PERCUTANEOUS PERITONEAL OR RETROPERITONEAL FLUID COLLECTION DRAINAGE  2023    CT GUIDED PERCUTANEOUS PERITONEAL OR RETROPERITONEAL FLUID COLLECTION DRAINAGE [facility]    HYSTEROSCOPY         Anesthesia History  Pt denies any past history of anesthetic complications such as PONV, awareness, prolonged sedation, dental damage, aspiration, cardiac arrest, difficult intubation, difficult I.V. access or unexpected hospital admissions.  NO malignant hyperthermia and or pseudo cholinesterase deficiency.    The patient is not  a Bahai and will accept blood and blood products if medically indicated.   No history of blood transfusions .Type and screen  sent.     Social History  Social History     Substance and Sexual Activity   Drug Use Yes    Types: Marijuana    Pt instructed to stop 7 days prior   Social History     Substance and Sexual Activity   Alcohol Use Not Currently      Social History     Tobacco Use   Smoking Status Never    Passive exposure: Never   Smokeless Tobacco Never          No family history on file.    No Known Allergies    Prior to Admission medications    Medication Sig Start Date End Date Taking? Authorizing Provider   acetaminophen (Tylenol 8 HOUR) 650 mg ER tablet  Take 1 tablet (650 mg) by mouth every 8 hours if needed for mild pain (1 - 3). Do not crush, chew, or split.   Yes Historical Provider, MD   chlorhexidine (Peridex) 0.12 % solution Use 15 mL in the mouth or throat once daily for 2 days. 11/14/24 11/16/24  Nisha K Zaynab APRN-CNP   pantoprazole (ProtoNix) 40 mg EC tablet Take 1 tablet (40 mg) by mouth once daily in the morning. Take before meals. Do not crush, chew, or split.  Patient not taking: Reported on 11/14/2024 5/30/24 6/29/24  Bay Rios MD   ondansetron (Zofran) 4 mg tablet Take 1 tablet (4 mg) by mouth every 8 hours if needed for nausea or vomiting. 5/30/24 11/14/24  Bay Rios MD        PAT ROS:   Constitutional:   neg    Neuro/Psych:   Eyes:   Ears:   Nose:    sinus congestion  Mouth:   neg    Throat:   neg    Neck:   neg    Cardio:   neg    Respiratory:    cough  Endocrine:   GI:   neg    :   neg    Musculoskeletal:   Hematologic:   neg    Skin:  neg        Physical Exam  Vitals reviewed.   Constitutional:       Appearance: Normal appearance.   HENT:      Head: Normocephalic.      Mouth/Throat:      Mouth: Mucous membranes are moist.   Eyes:      Extraocular Movements: Extraocular movements intact.      Pupils: Pupils are equal, round, and reactive to light.   Cardiovascular:      Rate and Rhythm: Normal rate and regular rhythm.      Pulses: Normal pulses.      Heart sounds: Normal heart sounds.   Pulmonary:      Effort: Pulmonary effort is normal.      Breath sounds: Normal breath sounds.   Musculoskeletal:         General: Tenderness present.      Cervical back: Normal range of motion.   Skin:     General: Skin is warm and dry.   Neurological:      Mental Status: She is alert and oriented to person, place, and time.   Psychiatric:         Behavior: Behavior normal.          PAT AIRWAY:   Airway:     Mallampati::  I  normal        Testing/Diagnostic:     EKG in [facility]     Lab Results   Component Value Date    WBC 8.1 11/14/2024  "   HGB 14.5 11/14/2024    HCT 44.3 11/14/2024    MCV 86 11/14/2024     11/14/2024     Results from last 7 days   Lab Units 11/14/24  1628   SODIUM mmol/L 136   POTASSIUM mmol/L 4.1   CHLORIDE mmol/L 101   CO2 mmol/L 25   BUN mg/dL 11   CREATININE mg/dL 0.75   CALCIUM mg/dL 9.1   PROTEIN TOTAL g/dL 8.0   BILIRUBIN TOTAL mg/dL 0.8   ALK PHOS U/L 135*   ALT U/L 71*   AST U/L 41*   GLUCOSE mg/dL 87       Lab Results   Component Value Date    HGBA1C 5.4 11/14/2024     UTOX NEGATIVE       MRSA PENDING     Patient Specialist/PCP:   /72   Pulse 88   Temp 36.9 °C (98.5 °F)   Resp 16   Ht 1.727 m (5' 8\")   Wt 103 kg (227 lb 1.2 oz)   SpO2 100%   BMI 34.53 kg/m²       ASSESSMENT/PLAN    Patient is a 29 year-old  scheduled for Right Hip Total Arthroplasty ~ Posterior Approach  with Dr. Shahbaz mullins   on  11/18/24 .  CARDIOVASCULAR:  RCRI score / Risk: The patients score is 0 based on history . Per ACC/AHA guidelines this places her  at  3.9% risk for MACE undergoing a intermediate  risk procedure . The patient has the following risk factors: denies   Functional Capacity: The patients exercise tolerance is  4  METS. This is based on the patient's limited only due to hipm pain . Patient denies  active cardiac symptoms or anginal equivalents .      PULMONARY:  The patient has the following factors that place them at increased risk of perioperative pulmonary complications;age greater than 65/BMI greater than 27/smoker /snores /greater than 2.0 hour procedure.  Postoperatively the patient would benefit from early pulmonary toilet/incentive spirometry q 1-2 hours while awake/pulse oximetry/cautious use of respiratory depressant medications such as opioids/elevate the HOB/oral hygiene.    HX COCAINE USE :  Pt has had 2 prior surgeries cancelled due to positive cocaine utox.  UTOX ordered with today's labs .    DVT:  CAPRINI SCORE=6  The patient has the following factors that increase her  Risk for thrombus formation " ; Virchow's triad , bmi>25, TJR, Surgical procedure >2 hrs  procedure .    Recommendations: DVT prophylaxis  per Dr. Shahbaz mullins protocol . SCD's, ROLANDA's, and early ambulation are recommended. Heparin or LMWH is recommended for the very high risk .      Risk assessment complete.  Patient is scheduled for  intermediate  surgical risk procedure.  Patient is considered an increased risk pending drug screen  risk to proceed with the planned procedure.      Preoperative medication instructions were provided and reviewed with the patient.  Any additional testing or evaluation was explained to the patient.  Nothing by mouth instructions were discussed and patient's questions were answered prior to conclusion to this encounter.  Patient verbalized understanding of preoperative instructions given in preadmission testing; discharge instructions available in EMR.

## 2024-11-14 NOTE — PREPROCEDURE INSTRUCTIONS
Medication List            Accurate as of November 14, 2024  4:12 PM. Always use your most recent med list.                acetaminophen 650 mg ER tablet  Commonly known as: Tylenol 8 HOUR  Medication Adjustments for Surgery: Take/Use as prescribed     chlorhexidine 0.12 % solution  Commonly known as: Peridex  Use 15 mL in the mouth or throat once daily for 2 days.  Medication Adjustments for Surgery: Take/Use as prescribed     pantoprazole 40 mg EC tablet  Commonly known as: ProtoNix  Take 1 tablet (40 mg) by mouth once daily in the morning. Take before meals. Do not crush, chew, or split.  Medication Adjustments for Surgery: Take on the morning of surgery              CONTACT SURGEON'S OFFICE IF YOU DEVELOP:  * Fever = 100.4 F   * New respiratory symptoms (e.g. cough, shortness of breath, respiratory distress, sore throat)  * Recent loss of taste or smell  *Flu like symptoms such as headache, fatigue or gastrointestinal symptoms  * You develop any open sores, shingles, burning or painful urination   AND/OR:  * You no longer wish to have the surgery.  * Any other personal circumstances change that may lead to the need to cancel or defer this surgery.  *You were admitted to any hospital within one week of your planned procedure.    SMOKING:  *Quitting smoking can make a huge difference to your health and recovery from surgery.    *If you need help with quitting, call 9-132-QUIT-NOW.    THE DAY BEFORE SURGERY:  *Do not eat any food after midnight the night before surgery.   You may have up to 13.5 ounces of clear liquid until TWO hours before your instructed arrival time to the hospital  DIABETICS:  Please check fasting blood sugar  upon waking up.  If fasting sugar is <80 mg/dl, please drink 100ml/3oz of apple juice no later than 2 hours prior to surgery.      SURGICAL TIME  *You will be contacted between 2 p.m. and 6 p.m. the business day before your surgery with your arrival time.  *If you haven't received a  call by 6pm, call [phone].  *Scheduled surgery times may change and you will be notified if this occurs-check your personal voicemail for any updates.    ON THE MORNING OF SURGERY:  *Wear comfortable, loose fitting clothing.   *Do not use moisturizers, creams, lotions or perfume.  *All jewelry and valuables should be left at home.  *Prosthetic devices such as contact lenses, hearing aids, dentures, eyelash extensions, hairpins and body piercing must be removed before surgery.    BRING WITH YOU:  *Photo ID and insurance card  *Current list of medicines and allergies  *Pacemaker/Defibrillator/Heart stent cards  *CPAP machine and mask  *Slings/splints/crutches  *Copy of your complete Advanced Directive/DHPOA-if applicable  *Neurostimulator implant remote    PARKING AND ARRIVAL:  *Check in at the Main Entrance desk and let them know you are here for surgery.  *You will be directed to the 2nd floor surgical waiting area.    AFTER OUTPATIENT SURGERY:  *A responsible adult MUST accompany you at the time of discharge and stay with you for 24 hours after your surgery.  *You may NOT drive yourself home after surgery.  *You may use a taxi or ride sharing service ([employer], Uber) to return home ONLY if you are accompanied by a friend or family member.  *Instructions for resuming your medications will be provided by your surgeon.    Home Preoperative Antibacterial Shower     What is a home preoperative antibacterial shower?  This shower is a way of cleaning the skin with a germ killing soap before surgery.  The soap contains chlorhexidine, commonly known as CHG.  CHG is a soap for your skin with germ killing ability.  Let your doctor know if you are allergic to chlorhexidine.    Why do I need to take a preoperative antibacterial shower?  Skin is not sterile.  It is best to try to make your skin as free of germs as possible before surgery.  Proper cleansing with a germ killing soap before surgery can lower the number of germs on  your skin.  This helps to reduce the risk of infection at the surgical site.  Following the instructions listed below will help you prepare your skin for surgery.      How do I use the CHG skin cleanser?  Steps:  Begin using your CHG soap five days before your scheduled surgery on ________________________.    Days 1-4 Shower before bed:  Wash your face and genitals with your normal soap and rinse.    Wash and rinse your hair using the CHG soap. Rinse completely, do not condition your   Hair.          3.    Apply the CHG soap to a clean wet washcloth.  Turn the water off or move away                From the water spray to avoid premature rinsing of the CHG soap as you are applying.     4.   Lather your entire body from the neck down.  Do not use on your face or genitals.   Pay special attention to the area(s) where your incision(s) will be located unless they are on your face.  Avoid scrubbing your skin too hard.  The important point is to have the CHG soap sit on your skin for 3 minutes.    When the 3 minutes are up, turn on the water and rinse the CHG soap off your body completely.   Pat yourself dry with a clean, freshly-laundered towel.  Dress in clean, freshly laundered night clothes.    Be sure to sleep with clean, freshly laundered sheets.  Day 5:  Last shower is the morning before surgery: Follow above Instructions.    NOTE:    *Hair extensions should be removed    *Keep CHG soap out of eyes and ear canals   *DO NOT wash with regular soap on your body after you have used the CHG        soap solution  *DO NOT apply powders, lotions, or perfume.  *Deodorant may be used days 1-4, BUT NOT the day of surgery    Who should I contact if I have any questions regarding the use of CHG soap?  Call the [facility], Preadmission Testing at [phone] if you have any questions.              Patient Information: Pre-Operative Infection Prevention Measures     Why did I have my nose, under my  arms and groin swabbed?  The purpose of the swab is to identify Staphylococcus aureus inside your nose or on your skin.  The swab was sent to the laboratory for culture.  A positive swab/culture for Staphylococcus aureus is called colonization or carriage.      What is Staphylococcus aureus?  Staphylococcus aureus, also known as “staph”, is a germ found on the skin or in the nose of healthy people.  Sometimes Staphylococcus aureus can get into the body and cause an infection.  This can be minor (such as pimples, boils or other skin problems).  It might also be serious (such as blood infection, pneumonia or a surgical site infection).    What is Staphylococcus aureus colonization or carriage?  Colonization or carriage means that a person has the germ but is not sick from it.  These bacteria can be spread on the hands or when breathing or sneezing.    How is Staphylococcus aureus spread?  It is most often spread by close contact with a person or item that carries it.    What happens if my culture is positive for Staphylococcus aureus?  Your doctor/medical team will use this information to guide any antibiotic treatment which may be necessary.  Regardless of the culture results, we will clean the inside of your nose with a betadine swab just before you have your surgery.      Will I get an infection if I have Staphylococcus aureus in my nose or on my skin?  Anyone can get an infection with Staphylococcus aureus.  However, the best way to reduce your risk of infection is to follow the instructions provided to you for the use of your CHG soap and dental rinse.        Who should I contact if I have any questions?  Call the [facility], Preadmission Testing at [phone] if you have any questions.           Patient Information: Oral/Dental Rinse  **This is a prescription; pick it up at your preferred local pharmacy **  What is oral/dental rinse?   It is a mouthwash. It is a way of cleaning  the mouth with a germ killing solution before your surgery.  The solution contains chlorhexidine, commonly known as CHG.   It is used inside the mouth to kill a bacteria known as Staphylococcus aureus.  Let your doctor know if you are allergic to Chlorhexidine.    Why do I need to use CHG oral/dental rinse?  The CHG oral/dental rinse helps to kill a bacteria in your mouth known a Staphylococcus aureus.     This reduces the risk of infection at the surgical site.      Using your CHG oral/dental rinse  STEPS:  Use your CHG oral/dental rinse after you brush your teeth the night before (at bedtime) and the morning of your surgery.  Follow all directions on your prescription label.    Use the cap on the container to measure 15ml (fill cap to fill line)  Swish (gargle if you can) the mouthwash in your mouth for at least 30 seconds, (do not to swallow) spit out  After you use your CHG rinse, do not rinse your mouth with water, drink or eat.  Please refer to prescription label for the appropriate time to resume oral intake  Dental rinse comes in one size bottle: 473ml ~16oz.  You will have leftover    rinse, discard after this use.    What side effects might I have using the CHG oral/dental rinse?  CHG rinse will stick to plaque on the teeth.  Brush and floss just before use.  Teeth brushing will help avoid staining of plaque during use.    Who should I contact if I have questions about the CHG oral/dental rinse?  Please call [facility], Preadmission Testing at [phone] if you have any questions

## 2024-11-16 ENCOUNTER — ANESTHESIA EVENT (OUTPATIENT)
Dept: OPERATING ROOM | Facility: HOSPITAL | Age: 29
End: 2024-11-16
Payer: MEDICAID

## 2024-11-16 LAB — STAPHYLOCOCCUS SPEC CULT: ABNORMAL

## 2024-11-17 RX ORDER — CEFADROXIL 500 MG/1
500 CAPSULE ORAL 2 TIMES DAILY
Qty: 10 CAPSULE | Refills: 0 | Status: SHIPPED | OUTPATIENT
Start: 2024-11-17 | End: 2024-11-24

## 2024-11-17 RX ORDER — OXYCODONE HYDROCHLORIDE 5 MG/1
5-10 TABLET ORAL EVERY 6 HOURS PRN
Qty: 40 TABLET | Refills: 0 | Status: SHIPPED | OUTPATIENT
Start: 2024-11-17 | End: 2024-11-22 | Stop reason: SDUPTHER

## 2024-11-17 RX ORDER — SENNOSIDES 8.6 MG/1
1 TABLET ORAL DAILY
Qty: 15 TABLET | Refills: 0 | Status: SHIPPED | OUTPATIENT
Start: 2024-11-17 | End: 2024-12-04

## 2024-11-17 RX ORDER — PANTOPRAZOLE SODIUM 40 MG/1
40 TABLET, DELAYED RELEASE ORAL
Qty: 30 TABLET | Refills: 0 | Status: SHIPPED | OUTPATIENT
Start: 2024-11-17 | End: 2024-12-19

## 2024-11-17 RX ORDER — DOCUSATE SODIUM 100 MG/1
100 CAPSULE, LIQUID FILLED ORAL 2 TIMES DAILY
Qty: 30 CAPSULE | Refills: 0 | Status: SHIPPED | OUTPATIENT
Start: 2024-11-17 | End: 2024-12-04

## 2024-11-17 RX ORDER — NAPROXEN SODIUM 220 MG/1
81 TABLET, FILM COATED ORAL 2 TIMES DAILY
Qty: 60 TABLET | Refills: 0 | Status: SHIPPED | OUTPATIENT
Start: 2024-11-17 | End: 2024-12-19

## 2024-11-17 RX ORDER — TRAMADOL HYDROCHLORIDE 50 MG/1
50-100 TABLET ORAL EVERY 6 HOURS PRN
Qty: 40 TABLET | Refills: 0 | Status: SHIPPED | OUTPATIENT
Start: 2024-11-17 | End: 2024-11-22 | Stop reason: SDUPTHER

## 2024-11-17 RX ORDER — ONDANSETRON 4 MG/1
4 TABLET, FILM COATED ORAL EVERY 8 HOURS PRN
Qty: 20 TABLET | Refills: 0 | Status: SHIPPED | OUTPATIENT
Start: 2024-11-17

## 2024-11-17 RX ORDER — ACETAMINOPHEN 500 MG
1000 TABLET ORAL EVERY 8 HOURS
Qty: 60 TABLET | Refills: 1 | Status: SHIPPED | OUTPATIENT
Start: 2024-11-17 | End: 2024-12-09

## 2024-11-17 NOTE — DISCHARGE INSTRUCTIONS
Bay Rios MD MS  [address] [facility]   [address]  [phone] (office)  [phone] (fax)    PLEASE READ CAREFULLY BEFORE CONTACTING YOUR PROVIDER.    WE WORK COLLABORATIVELY AS A TEAM. CALLING MULTIPLE STAFF MEMBERS REGARDING THE SAME ISSUE WILL DELAY YOUR CARE.  [employer] IS THE PREFERRED COMMUNICATION FOR ALL TEAM MEMBERS.    Postoperative Instructions: TOTAL HIP ARTHROPLASTY    JOINT CARE TEAM  Please use the information below to contact your care team following surgery.  If you are leaving a message, please include your full name, date of birth and date of surgery so that we can correctly identify you.  Your call will be returned within 1-2 business days, please do not leave multiple messages regarding a single issue while you are awaiting a return call.     Who to call Contact Information Matters needing handled   Bay Rios MD [email or web address] [facility]  [phone] Prescription Refills   Orders for dental antibiotics lifelong     Ese Saldivar MBA, BSN, RN-BC  CORTNEY Keating, RN  Ortho Program Navigators - CORTNEY Sy, RN  Ortho Coordinator Alberto SOUZAN-BC  Ortho Nurse Navigator   [phone] Nursing, medical question related questions or concerns within 6 weeks of surgery   Orders for Outpatient Physical Therapy                    [phone]     Scheduling office Visits  Medical questions/concerns  Leave of Absence or other paperwork  Any concerns more than 6 weeks from surgery - an appointment will need to be made     MEDICATION REFILLS - (MyChart or Main Office)    You will not receive a call indicating that your prescription has been filled.  Please contact your pharmacy with any questions.    Medication refills will be filled Monday-Friday 7am to 1pm ONLY. Please call the office or send a [email or web address] message for a refill request.  Any requests received outside of this timeframe will be handled on the next  business day.  The office staff and orthopedic nurses cannot refill medications; messages should be left directly through the office or via my chart.  Please do not call multiple times or call other members of the care team for medication needs, this will cause the refill to take longer.    Per State and Institutional policy, pain medications can only be refilled every 7 days for up to six weeks following surgery.    DRIVING & TRAVEL AFTER SURGERY   Patients should anticipate waiting at least 4-6 weeks before traveling long distances after surgery.  You will need to stop to walk around ever 1 hour during your travel to help with blood clot prevention.  Please call the office or your joint nurse to discuss prior to post-surgical travel.  Patients may not drive until cleared by the joint nurse or the office.    DENTAL PROCEDURES & CLEANINGS  You must wait a minimum of 3 months for elective dental appointments, including routine cleanings or dental work including bridges, crowns, extractions, etc..  For any dental visit - cleaning or dental procedures - patients must take an antibiotic 1 hour before the appointment.  Antibiotics are a lifelong need before dental appointments.  The antibiotic prescribed will be based on each patient's allergies.    WOUND CARE  Pain and swelling are normal following surgery and can last for weeks to months depending on the patient.  To help relieve these symptoms, please follow the post-operative pain regimen as it has been prescribed, use ice often, wear compression stockings every day as prescribed, and elevate your leg every hour.   Leaving the hospital, you have an ACE wrap in place. Two days after surgery, you can remove the ACE wrap and discard it. It does NOT need to be reapplied again.  You have a waterproof bandage on your wound and may shower with this on. The waterproof bandage is to remain in place for a 7 days. You or your home therapist should remove it at this time. You  may leave your incision open to air after the bandage has been removed.  You may shower 48 hours after surgery.  Do not scrub directly over or near the edges of your surgical bandage.  Soap and water may run freely over the site.  Under your waterproof bandage you have Steri-strips or a mesh covering in place. DO NOT peel them off. They will fall off on their own. You can continue to shower with these on. Let the water run freely over your incision when showering and do not scrub the incision or the surrounding area.   When drying the area around the incision, please use a SEPARATE towel that was not used on the rest of your body. The towel should be recently laundered but does not have to be cleaned a specific way. It should be laundered every 3 days. Pat the area dry. Do not rub the area around the incision. Steri strips will fall off in 1-2 weeks. If after 2 weeks they, have not, gently peel them off.  You may have clear stitches at the ends of your wound. Place Band-Aids on them for the first few weeks to prevent rubbing. You can gently tug on them after 2 weeks and they will come out or fall out on their own. DO NOT cut them. If they have not fallen out by you post-op visit, your doctor will remove them  If you have black stitches in place, your doctor will remove them in 2 weeks. These CANNOT get wet. If you have the silver waterproof bandage in place, you can shower. If the waterproof bandage is removed or not sticking, you CANNOT shower.  DO NOT soak your incision in a bath, hot tub, pool or pond/lake for a minimum of 12 weeks following your surgery.  DO NOT use lotions, creams, ointments on your wound for a minimum of 6 weeks following your surgery. At that time you may use vitamin E to assist with softening of your incision. Your physical therapist or doctor will talk to you about scar massage which can be started at 6 weeks.   You do NOT need to use the soap that was provided to you prior to surgery after  surgery. Use regular soap or shampoo.     PAIN, SWELLING, BRUISING & CLICKING  Pain and swelling are a natural part of your recovery which is considered normal fur up to a year after surgery.  Symptoms may be treated with movement, ice, compression stockings, elevating your leg, and by following the pain medication regimen as prescribed.  Bruising is normal for several weeks after surgery and will run down the leg over time.  You may ice areas that are tender to help with discomfort.  You are required to wear the provided compression stockings, every day, for 4 weeks following surgery.  Remove the stockings at night and place them back on in the morning.  Pain and swelling may temporarily increase with an increase in activity or exercise.  Use ice after activity.  Audible clicking with movement or exercises is considered normal following joint replacement.  You may also feel decreased sensation or numbness near the incision site.  These are usually normal and may or may not fade over time.     PERSONAL HYGIENE  You may shower upon discharging from the hospital.  Soap and water is permitted to run over the surgical dressing, steri-strips and incision.  Do not scrub directly over these items.  DO NOT soak your incision in a bath, hot tub, pool or pond/lake for a minimum of 12 weeks following your surgery.  DO NOT use lotions, creams, ointments on your wound for a minimum of 6 weeks following your surgery. At that time you may use vitamin E to assist with softening of your incision.      RESTARTING HOME ROUTINE - DIET & MEDICATIONS  Post-operative constipation can result due to a combination of inactivity, anesthesia and pain medication. To help prevent this, you should increase your water and fiber intake. Physical activity such as walking will also help stimulate the bowels.   You may resume your normal diet when you discharge home.  Choose foods that help promote good bowel habits and prevent constipation, such as  foods high in fiber.  You may restart your home medications the following day after your surgery UNLESS you have been given alternate instructions.  Follow the instructions given to you on your hospital discharge instructions for more information regarding your home medications.      IN-HOME PHYSICAL THERAPY & OUTPATIENT PHYSICAL THERAPY  Remember to get up and walk around your home every hour to 90 minutes to prevent blood clots and help with your recovery. This is an ACTIVE recovery.  In-home physical therapy will start 1-2 days after you get home from the hospital.    The home care agency will call within the first 24-48 hours to set up their first visit.  Please do not call your care team to inquire during this timeframe.  Continue the exercises you were given in the hospital until you have been seen by in-home therapy.  Make sure to provide a phone number with the ability for the home care staff to leave a message if you do not answer your phone.    Outpatient physical therapy following hip replacement surgery should begin 2-3 weeks after surgery if you and your physical therapist feel that you need it.  You should call to schedule this appointment ASAP if not already scheduled before surgery.  Waiting until you are ready for outpatient physical therapy will cause a delay in your care.  You may choose any outpatient physical therapy location.  Call the office for an order if needed.    EMERGENCIES - WHEN TO CONTACT THE SURGEON'S OFFICE IMMEDIATELY  Fever >101 with chills that has been present for at least 48 hours.   Excessive bleeding from incision that will not slow down. A small amount of drainage is normal and expected.  Once pressure is applied and the area is covered, do not continue to check the area regularly.  This will remove pressure and bleeding will continue.  Leave in place for 4-6 hours.  Signs of infection of incision-excessive drainage that is soaking through your dressing (especially if it is  pus-like), redness that is spreading out from the edges of your incision, or increased warmth around the area.  Excruciating pain for which the pain medication, taken as instructed, is not helping.  Severe calf pain.  Go directly to the emergency room or call 911, if you are experiencing chest pain or difficulty breathing.    ICE & COLD THERAPY INSTRUCTIONS    To assist with pain control and post-op swelling, you should be using ice regularly throughout recovery, especially for the first 6 weeks, regardless of the cold therapy method you use.      Always make sure there is a layer of protection between the cold pad and your skin.    If you are using ICE PACKS or GEL PACKS, you will need to alternate 20 minutes on, 20 minutes off twice per hour.    If you are using an ICE MACHINE, please follow the provided ice machine instructions.  These devices differ from ice or ice packs whereas the mechanism circulates water through tubing and a pad to provide longer periods of cold therapy to the desired site.  You can use your cold devices around the clock for optimal comfort.  We recommend using cold therapy after working with therapy or completing exercises on your own.  There is no set schedule in which you must follow while using cold therapy.  Below are a few points to remember when using a cold therapy device:    You do not need to need to use the 20 on, 20 off method.  Detach the pad from the cooler and ambulate at least once every hour.  You can check your skin under the pad at this time.  You may wear the cold therapy device during periods of sleep including overnight.  If you wake up during the night, you can check the skin at this time.  You do not need to wake up specifically to perform skin checks.  Empty the cooler and pad when device is not in use.  Follow 's instructions for cleaning your cold therapy device.      DISCHARGE MEDICATIONS - Please reference the sample schedule for instructions on how  to best schedule medications.  PAIN MEDICATION    ___X_ Tramadol / Oxycodone  Tramadol and Oxycodone have been prescribed for post-operative pain control.    These medications will only be refilled ONCE every 7 days for a period of up to 6 weeks following surgery.  After 6 weeks, you will transition to acetaminophen and over -the- counter anti-inflammatories such as Ibuprofen, Advil or Aleve in conjunction with ICE/COLD THERAPY.   Side effects may be constipation and nausea, vomiting, sleepiness, dizziness, lightheadedness, headache, blurred vision, dry mouth sweating, itching (if you have itching, over-the -counter Benadryl can be used as needed).  You may NOT operate a motor vehicle while taking these medications or have been cleared by your care team.     ___X_ Acetaminophen (Tylenol)  Acetaminophen has been prescribed as an adjunct for pain control. Take two 500 mg tablets every 8 hours for 4 weeks. You will not receive a refill on this medication.  Do not exceed 4000mg of acetaminophen within a 24 hour period.  Side effects may include nausea, heartburn, drowsiness, and headache.    _______ Meloxicam (Mobic)-Meloxicam has been prescribed as an adjunct anti-inflammatory to assist in pain control.    Take one 15mg tablet once daily for 4 weeks.  You will not receive refills on this medication.   Side effects may include nausea.  May not be prescribed if you are on a more potent blood thinner than aspirin or have chronic kidney disease.    BLOOD THINNER    ___X_ Blood Thinner   Aspirin, eliquis or xarelto has been prescribed as a blood thinner to prevent blood clots in your leg or lungs. Take as prescribed on the bottle for 4 weeks. You will not receive a refill on this medication.  Do not take this medication if you are on another blood thinner.  Do not take any additional anti-inflammatory medications while taking Aspirin or another blood thinning medication.  Examples may include, Celebrex, Ibuprofen, Motrin,  Aleve, or Advil.     ANTI NAUSEA    ___X_ Pantoprazole (Protonix)  Pantoprazole has been prescribed to help with nausea and protect your stomach while taking pain medication. Take one 40 mg tablet once daily for 4 weeks. You will not receive a refill on this medication.    ___X_ Zofran (Ondansetron)  Zofran has been prescribed to help with nausea and protect your stomach while taking pain medication. Take one 4 mg tablet every eight hours as needed for nausea. Refills will be provided as necessary.      STOOL SOFTENERS    ___X_ Colace (Docusate Sodium) and Senna (Sennoside)  Post-operative constipation can result due to a combination of inactivity, anesthesia and pain medication. To help prevent this, you should increase your water and fiber intake. Physical activity such as walking will also help stimulate the bowels.   Colace has been prescribed to help with constipation while on Oxycodone and Tramadol. Take one 100 mg tablet twice daily for 4 weeks. No refills needed.  Senna has been prescribed in combination with Colace to help with constipation while taking your pain medications.  Take one 8.6mg Tablet once daily.      ANTIBIOTICS    ___X_ Cefadroxil or Doxycycline   Post-operative prevention of infection   Side effects can be upset stomach or diarrhea  Take with food. Do not take on an empty stomach  May not be prescribed     You will not receive refills on the following medications:  Acetaminophen (Tylenol)  Senna  Colace  Pantoprazole  Blood Thinner (Aspirin or Eliquis)  Antibiotic (Cefadroxil or Doxycycline)  Pain Medication Refills -[phone] or [facility]- Monday through Friday 7am-1pm    Medication refills will be sent upon receipt of your request during the times listed above. Due to the high call volume, you will not receive a call confirming prescription refills; please do not call multiple times.  Prescription refills may take a few hours to process, you may follow up with your pharmacy for pickup  availability.    SAMPLE              The times below are an example of how to organize medications to optimize pain control  Your actual medication schedule may vary based on your last dose taken IN THE HOSPITAL      Time 3:00am 6:00am 9:00am 12:00pm 3:00pm 6:00pm 9:00pm 12:00am   Medications Tramadol Acetaminophen (Tylenol)   Oxycodone  Senna   Blood Thinner  Colace  Pantoprazole  Tramadol  Antibiotic  Oxycodone Tramadol  Acetaminophen (Tylenol) Oxycodone     Blood Thinner  Colace  Tramadol  Antibiotic   Acetaminophen (Tylenol)   Oxycodone            You may begin to wean off the pain medication as your pain remains controlled with increased activity.  The schedules provided are meant to serve as an example.  You may wean off based on your pain control.  Please note that pain medications are not filled beyond 6 weeks after surgery.              The times below are an example of how to WEAN OFF medications WHILE CONTINUING TO OPTIMIZE PAIN CONTROL.  Your actual medication schedule may vary based on your last dose taken.  Time 12:00am 4:00am 8:00am 12:00pm 4:00pm 8:00pm   Med Tramadol Oxycodone   Tramadol Oxycodone Tramadol Oxycodone     Time 12:00am 6:00am 12:00pm 6:00pm   Med Tramadol Oxycodone   Tramadol Oxycodone     Time 12:00am 8:00am 4:00pm   Med Tramadol Oxycodone   Tramadol     Time 12:00am 12:00pm   Med Tramadol Tramadol

## 2024-11-18 ENCOUNTER — APPOINTMENT (OUTPATIENT)
Dept: RADIOLOGY | Facility: HOSPITAL | Age: 29
End: 2024-11-18
Payer: MEDICAID

## 2024-11-18 ENCOUNTER — HOSPITAL ENCOUNTER (INPATIENT)
Facility: HOSPITAL | Age: 29
LOS: 1 days | Discharge: HOME HEALTH | End: 2024-11-19
Attending: STUDENT IN AN ORGANIZED HEALTH CARE EDUCATION/TRAINING PROGRAM | Admitting: STUDENT IN AN ORGANIZED HEALTH CARE EDUCATION/TRAINING PROGRAM
Payer: MEDICAID

## 2024-11-18 ENCOUNTER — HOME HEALTH ADMISSION (OUTPATIENT)
Dept: HOME HEALTH SERVICES | Facility: HOME HEALTH | Age: 29
End: 2024-11-18
Payer: MEDICAID

## 2024-11-18 ENCOUNTER — ANESTHESIA (OUTPATIENT)
Dept: OPERATING ROOM | Facility: HOSPITAL | Age: 29
End: 2024-11-18
Payer: MEDICAID

## 2024-11-18 DIAGNOSIS — M16.51 POST-TRAUMATIC OSTEOARTHRITIS OF RIGHT HIP: ICD-10-CM

## 2024-11-18 DIAGNOSIS — M16.11 PRIMARY OSTEOARTHRITIS OF RIGHT HIP: ICD-10-CM

## 2024-11-18 DIAGNOSIS — Z96.641 S/P TOTAL RIGHT HIP ARTHROPLASTY: Primary | ICD-10-CM

## 2024-11-18 LAB — PREGNANCY TEST URINE, POC: NEGATIVE

## 2024-11-18 PROCEDURE — 2500000004 HC RX 250 GENERAL PHARMACY W/ HCPCS (ALT 636 FOR OP/ED): Mod: JZ

## 2024-11-18 PROCEDURE — 2780000003 HC OR 278 NO HCPCS: Performed by: STUDENT IN AN ORGANIZED HEALTH CARE EDUCATION/TRAINING PROGRAM

## 2024-11-18 PROCEDURE — P9045 ALBUMIN (HUMAN), 5%, 250 ML: HCPCS | Mod: JZ | Performed by: ANESTHESIOLOGIST ASSISTANT

## 2024-11-18 PROCEDURE — 3700000002 HC GENERAL ANESTHESIA TIME - EACH INCREMENTAL 1 MINUTE: Performed by: STUDENT IN AN ORGANIZED HEALTH CARE EDUCATION/TRAINING PROGRAM

## 2024-11-18 PROCEDURE — 2500000004 HC RX 250 GENERAL PHARMACY W/ HCPCS (ALT 636 FOR OP/ED): Performed by: STUDENT IN AN ORGANIZED HEALTH CARE EDUCATION/TRAINING PROGRAM

## 2024-11-18 PROCEDURE — 73552 X-RAY EXAM OF FEMUR 2/>: CPT | Performed by: RADIOLOGY

## 2024-11-18 PROCEDURE — 2500000004 HC RX 250 GENERAL PHARMACY W/ HCPCS (ALT 636 FOR OP/ED): Performed by: ANESTHESIOLOGIST ASSISTANT

## 2024-11-18 PROCEDURE — 3700000001 HC GENERAL ANESTHESIA TIME - INITIAL BASE CHARGE: Performed by: STUDENT IN AN ORGANIZED HEALTH CARE EDUCATION/TRAINING PROGRAM

## 2024-11-18 PROCEDURE — C1776 JOINT DEVICE (IMPLANTABLE): HCPCS | Performed by: STUDENT IN AN ORGANIZED HEALTH CARE EDUCATION/TRAINING PROGRAM

## 2024-11-18 PROCEDURE — 2500000005 HC RX 250 GENERAL PHARMACY W/O HCPCS

## 2024-11-18 PROCEDURE — 3600000017 HC OR TIME - EACH INCREMENTAL 1 MINUTE - PROCEDURE LEVEL SIX: Performed by: STUDENT IN AN ORGANIZED HEALTH CARE EDUCATION/TRAINING PROGRAM

## 2024-11-18 PROCEDURE — RXMED WILLOW AMBULATORY MEDICATION CHARGE

## 2024-11-18 PROCEDURE — 81025 URINE PREGNANCY TEST: CPT | Performed by: STUDENT IN AN ORGANIZED HEALTH CARE EDUCATION/TRAINING PROGRAM

## 2024-11-18 PROCEDURE — 27132 TOTAL HIP ARTHROPLASTY: CPT | Performed by: STUDENT IN AN ORGANIZED HEALTH CARE EDUCATION/TRAINING PROGRAM

## 2024-11-18 PROCEDURE — 97110 THERAPEUTIC EXERCISES: CPT | Mod: GP

## 2024-11-18 PROCEDURE — 97161 PT EVAL LOW COMPLEX 20 MIN: CPT | Mod: GP

## 2024-11-18 PROCEDURE — 3600000018 HC OR TIME - INITIAL BASE CHARGE - PROCEDURE LEVEL SIX: Performed by: STUDENT IN AN ORGANIZED HEALTH CARE EDUCATION/TRAINING PROGRAM

## 2024-11-18 PROCEDURE — 72170 X-RAY EXAM OF PELVIS: CPT

## 2024-11-18 PROCEDURE — 2500000002 HC RX 250 W HCPCS SELF ADMINISTERED DRUGS (ALT 637 FOR MEDICARE OP, ALT 636 FOR OP/ED)

## 2024-11-18 PROCEDURE — 2500000005 HC RX 250 GENERAL PHARMACY W/O HCPCS: Performed by: STUDENT IN AN ORGANIZED HEALTH CARE EDUCATION/TRAINING PROGRAM

## 2024-11-18 PROCEDURE — 36415 COLL VENOUS BLD VENIPUNCTURE: CPT | Performed by: STUDENT IN AN ORGANIZED HEALTH CARE EDUCATION/TRAINING PROGRAM

## 2024-11-18 PROCEDURE — 7100000002 HC RECOVERY ROOM TIME - EACH INCREMENTAL 1 MINUTE: Performed by: STUDENT IN AN ORGANIZED HEALTH CARE EDUCATION/TRAINING PROGRAM

## 2024-11-18 PROCEDURE — 27132 TOTAL HIP ARTHROPLASTY: CPT

## 2024-11-18 PROCEDURE — 0QB60ZZ EXCISION OF RIGHT UPPER FEMUR, OPEN APPROACH: ICD-10-PCS | Performed by: STUDENT IN AN ORGANIZED HEALTH CARE EDUCATION/TRAINING PROGRAM

## 2024-11-18 PROCEDURE — 2500000004 HC RX 250 GENERAL PHARMACY W/ HCPCS (ALT 636 FOR OP/ED): Performed by: ANESTHESIOLOGY

## 2024-11-18 PROCEDURE — A27132 PR CONV PREV HIP SURG TO TOT HIP ARTHROPLAS: Performed by: ANESTHESIOLOGIST ASSISTANT

## 2024-11-18 PROCEDURE — 73551 X-RAY EXAM OF FEMUR 1: CPT | Mod: RT

## 2024-11-18 PROCEDURE — 2500000001 HC RX 250 WO HCPCS SELF ADMINISTERED DRUGS (ALT 637 FOR MEDICARE OP)

## 2024-11-18 PROCEDURE — A27132 PR CONV PREV HIP SURG TO TOT HIP ARTHROPLAS: Performed by: ANESTHESIOLOGY

## 2024-11-18 PROCEDURE — 2500000005 HC RX 250 GENERAL PHARMACY W/O HCPCS: Performed by: ANESTHESIOLOGIST ASSISTANT

## 2024-11-18 PROCEDURE — 2500000001 HC RX 250 WO HCPCS SELF ADMINISTERED DRUGS (ALT 637 FOR MEDICARE OP): Performed by: STUDENT IN AN ORGANIZED HEALTH CARE EDUCATION/TRAINING PROGRAM

## 2024-11-18 PROCEDURE — 9420000001 HC RT PATIENT EDUCATION 5 MIN

## 2024-11-18 PROCEDURE — 1100000001 HC PRIVATE ROOM DAILY

## 2024-11-18 PROCEDURE — 0SP904Z REMOVAL OF INTERNAL FIXATION DEVICE FROM RIGHT HIP JOINT, OPEN APPROACH: ICD-10-PCS | Performed by: STUDENT IN AN ORGANIZED HEALTH CARE EDUCATION/TRAINING PROGRAM

## 2024-11-18 PROCEDURE — 2720000007 HC OR 272 NO HCPCS: Performed by: STUDENT IN AN ORGANIZED HEALTH CARE EDUCATION/TRAINING PROGRAM

## 2024-11-18 PROCEDURE — 72170 X-RAY EXAM OF PELVIS: CPT | Performed by: RADIOLOGY

## 2024-11-18 PROCEDURE — 7100000001 HC RECOVERY ROOM TIME - INITIAL BASE CHARGE: Performed by: STUDENT IN AN ORGANIZED HEALTH CARE EDUCATION/TRAINING PROGRAM

## 2024-11-18 PROCEDURE — C1713 ANCHOR/SCREW BN/BN,TIS/BN: HCPCS | Performed by: STUDENT IN AN ORGANIZED HEALTH CARE EDUCATION/TRAINING PROGRAM

## 2024-11-18 PROCEDURE — 0SR904A REPLACEMENT OF RIGHT HIP JOINT WITH CERAMIC ON POLYETHYLENE SYNTHETIC SUBSTITUTE, UNCEMENTED, OPEN APPROACH: ICD-10-PCS | Performed by: STUDENT IN AN ORGANIZED HEALTH CARE EDUCATION/TRAINING PROGRAM

## 2024-11-18 PROCEDURE — 27355 REMOVE FEMUR LESION: CPT | Performed by: STUDENT IN AN ORGANIZED HEALTH CARE EDUCATION/TRAINING PROGRAM

## 2024-11-18 DEVICE — ACTIS DUOFIX HIP PROSTHESIS (FEMORAL STEM 12/14 TAPER CEMENTLESS SIZE 7 HIGH COLLAR)  CE
Type: IMPLANTABLE DEVICE | Site: HIP | Status: FUNCTIONAL
Brand: ACTIS

## 2024-11-18 DEVICE — BIOLOX DELTA CERAMIC FEMORAL HEAD +8.5 36MM DIA 12/14 TAPER
Type: IMPLANTABLE DEVICE | Site: HIP | Status: FUNCTIONAL
Brand: BIOLOX DELTA

## 2024-11-18 DEVICE — PINNACLE CANCELLOUS BONE SCREW 6.5MM X 20MM
Type: IMPLANTABLE DEVICE | Site: HIP | Status: FUNCTIONAL
Brand: PINNACLE

## 2024-11-18 DEVICE — IMPLANTABLE DEVICE: Type: IMPLANTABLE DEVICE | Site: HIP | Status: FUNCTIONAL

## 2024-11-18 DEVICE — PINNACLE CANCELLOUS BONE SCREW 6.5MM X 25MM
Type: IMPLANTABLE DEVICE | Site: HIP | Status: FUNCTIONAL
Brand: PINNACLE

## 2024-11-18 RX ORDER — DIPHENHYDRAMINE HYDROCHLORIDE 50 MG/ML
12.5 INJECTION INTRAMUSCULAR; INTRAVENOUS EVERY 6 HOURS PRN
Status: DISCONTINUED | OUTPATIENT
Start: 2024-11-18 | End: 2024-11-19 | Stop reason: HOSPADM

## 2024-11-18 RX ORDER — KETOROLAC TROMETHAMINE 30 MG/ML
INJECTION, SOLUTION INTRAMUSCULAR; INTRAVENOUS AS NEEDED
Status: DISCONTINUED | OUTPATIENT
Start: 2024-11-18 | End: 2024-11-18

## 2024-11-18 RX ORDER — BUSPIRONE HYDROCHLORIDE 10 MG/1
10 TABLET ORAL AS NEEDED
Status: DISCONTINUED | OUTPATIENT
Start: 2024-11-18 | End: 2024-11-19 | Stop reason: HOSPADM

## 2024-11-18 RX ORDER — HYDROMORPHONE HYDROCHLORIDE 1 MG/ML
INJECTION, SOLUTION INTRAMUSCULAR; INTRAVENOUS; SUBCUTANEOUS AS NEEDED
Status: DISCONTINUED | OUTPATIENT
Start: 2024-11-18 | End: 2024-11-18

## 2024-11-18 RX ORDER — LIDOCAINE HYDROCHLORIDE 20 MG/ML
INJECTION, SOLUTION INFILTRATION; PERINEURAL AS NEEDED
Status: DISCONTINUED | OUTPATIENT
Start: 2024-11-18 | End: 2024-11-18

## 2024-11-18 RX ORDER — ONDANSETRON HYDROCHLORIDE 2 MG/ML
4 INJECTION, SOLUTION INTRAVENOUS EVERY 8 HOURS PRN
Status: DISCONTINUED | OUTPATIENT
Start: 2024-11-18 | End: 2024-11-19 | Stop reason: HOSPADM

## 2024-11-18 RX ORDER — ONDANSETRON 4 MG/1
4 TABLET, ORALLY DISINTEGRATING ORAL EVERY 8 HOURS PRN
Status: DISCONTINUED | OUTPATIENT
Start: 2024-11-18 | End: 2024-11-19 | Stop reason: HOSPADM

## 2024-11-18 RX ORDER — FERROUS SULFATE 325(65) MG
65 TABLET ORAL
Status: DISCONTINUED | OUTPATIENT
Start: 2024-11-18 | End: 2024-11-19 | Stop reason: HOSPADM

## 2024-11-18 RX ORDER — VANCOMYCIN HYDROCHLORIDE 1 G/20ML
INJECTION, POWDER, LYOPHILIZED, FOR SOLUTION INTRAVENOUS AS NEEDED
Status: DISCONTINUED | OUTPATIENT
Start: 2024-11-18 | End: 2024-11-18 | Stop reason: HOSPADM

## 2024-11-18 RX ORDER — ACETAMINOPHEN 325 MG/1
650 TABLET ORAL ONCE
Status: COMPLETED | OUTPATIENT
Start: 2024-11-18 | End: 2024-11-18

## 2024-11-18 RX ORDER — HYDROMORPHONE HYDROCHLORIDE 1 MG/ML
0.5 INJECTION, SOLUTION INTRAMUSCULAR; INTRAVENOUS; SUBCUTANEOUS EVERY 5 MIN PRN
Status: DISCONTINUED | OUTPATIENT
Start: 2024-11-18 | End: 2024-11-18 | Stop reason: HOSPADM

## 2024-11-18 RX ORDER — ROCURONIUM BROMIDE 10 MG/ML
INJECTION, SOLUTION INTRAVENOUS AS NEEDED
Status: DISCONTINUED | OUTPATIENT
Start: 2024-11-18 | End: 2024-11-18

## 2024-11-18 RX ORDER — OXYCODONE HYDROCHLORIDE 5 MG/1
5 TABLET ORAL EVERY 6 HOURS PRN
Status: DISCONTINUED | OUTPATIENT
Start: 2024-11-18 | End: 2024-11-19 | Stop reason: HOSPADM

## 2024-11-18 RX ORDER — LIDOCAINE HYDROCHLORIDE 10 MG/ML
0.1 INJECTION, SOLUTION EPIDURAL; INFILTRATION; INTRACAUDAL; PERINEURAL ONCE
Status: DISCONTINUED | OUTPATIENT
Start: 2024-11-18 | End: 2024-11-18 | Stop reason: HOSPADM

## 2024-11-18 RX ORDER — HYDROMORPHONE HYDROCHLORIDE 0.2 MG/ML
0.2 INJECTION INTRAMUSCULAR; INTRAVENOUS; SUBCUTANEOUS EVERY 2 HOUR PRN
Status: DISCONTINUED | OUTPATIENT
Start: 2024-11-18 | End: 2024-11-19 | Stop reason: HOSPADM

## 2024-11-18 RX ORDER — ALBUMIN HUMAN 50 G/1000ML
SOLUTION INTRAVENOUS AS NEEDED
Status: DISCONTINUED | OUTPATIENT
Start: 2024-11-18 | End: 2024-11-18

## 2024-11-18 RX ORDER — SODIUM CHLORIDE, SODIUM LACTATE, POTASSIUM CHLORIDE, CALCIUM CHLORIDE 600; 310; 30; 20 MG/100ML; MG/100ML; MG/100ML; MG/100ML
100 INJECTION, SOLUTION INTRAVENOUS CONTINUOUS
Status: DISCONTINUED | OUTPATIENT
Start: 2024-11-18 | End: 2024-11-18 | Stop reason: HOSPADM

## 2024-11-18 RX ORDER — ONDANSETRON HYDROCHLORIDE 2 MG/ML
INJECTION, SOLUTION INTRAVENOUS AS NEEDED
Status: DISCONTINUED | OUTPATIENT
Start: 2024-11-18 | End: 2024-11-18

## 2024-11-18 RX ORDER — FENTANYL CITRATE 50 UG/ML
INJECTION, SOLUTION INTRAMUSCULAR; INTRAVENOUS AS NEEDED
Status: DISCONTINUED | OUTPATIENT
Start: 2024-11-18 | End: 2024-11-18

## 2024-11-18 RX ORDER — LABETALOL HYDROCHLORIDE 5 MG/ML
5 INJECTION, SOLUTION INTRAVENOUS ONCE AS NEEDED
Status: DISCONTINUED | OUTPATIENT
Start: 2024-11-18 | End: 2024-11-18 | Stop reason: HOSPADM

## 2024-11-18 RX ORDER — TRANEXAMIC ACID 100 MG/ML
INJECTION, SOLUTION INTRAVENOUS AS NEEDED
Status: DISCONTINUED | OUTPATIENT
Start: 2024-11-18 | End: 2024-11-18

## 2024-11-18 RX ORDER — ACETAMINOPHEN 325 MG/1
975 TABLET ORAL EVERY 8 HOURS
Status: DISCONTINUED | OUTPATIENT
Start: 2024-11-18 | End: 2024-11-19 | Stop reason: HOSPADM

## 2024-11-18 RX ORDER — HYDROMORPHONE HYDROCHLORIDE 1 MG/ML
0.2 INJECTION, SOLUTION INTRAMUSCULAR; INTRAVENOUS; SUBCUTANEOUS EVERY 5 MIN PRN
Status: DISCONTINUED | OUTPATIENT
Start: 2024-11-18 | End: 2024-11-18 | Stop reason: HOSPADM

## 2024-11-18 RX ORDER — ALBUTEROL SULFATE 0.83 MG/ML
2.5 SOLUTION RESPIRATORY (INHALATION) ONCE AS NEEDED
Status: DISCONTINUED | OUTPATIENT
Start: 2024-11-18 | End: 2024-11-18 | Stop reason: HOSPADM

## 2024-11-18 RX ORDER — CEFAZOLIN SODIUM 2 G/100ML
2 INJECTION, SOLUTION INTRAVENOUS EVERY 8 HOURS
Status: COMPLETED | OUTPATIENT
Start: 2024-11-18 | End: 2024-11-19

## 2024-11-18 RX ORDER — CEFAZOLIN 1 G/1
INJECTION, POWDER, FOR SOLUTION INTRAVENOUS AS NEEDED
Status: DISCONTINUED | OUTPATIENT
Start: 2024-11-18 | End: 2024-11-18

## 2024-11-18 RX ORDER — ONDANSETRON HYDROCHLORIDE 2 MG/ML
4 INJECTION, SOLUTION INTRAVENOUS ONCE AS NEEDED
Status: DISCONTINUED | OUTPATIENT
Start: 2024-11-18 | End: 2024-11-18 | Stop reason: HOSPADM

## 2024-11-18 RX ORDER — BUSPIRONE HYDROCHLORIDE 10 MG/1
10 TABLET ORAL AS NEEDED
COMMUNITY
Start: 2023-05-09 | End: 2024-11-19 | Stop reason: HOSPADM

## 2024-11-18 RX ORDER — POLYETHYLENE GLYCOL 3350 17 G/17G
17 POWDER, FOR SOLUTION ORAL DAILY
Status: DISCONTINUED | OUTPATIENT
Start: 2024-11-18 | End: 2024-11-19 | Stop reason: HOSPADM

## 2024-11-18 RX ORDER — MIDAZOLAM HYDROCHLORIDE 1 MG/ML
INJECTION, SOLUTION INTRAMUSCULAR; INTRAVENOUS AS NEEDED
Status: DISCONTINUED | OUTPATIENT
Start: 2024-11-18 | End: 2024-11-18

## 2024-11-18 RX ORDER — OXYCODONE HYDROCHLORIDE 5 MG/1
5 TABLET ORAL EVERY 4 HOURS PRN
Status: DISCONTINUED | OUTPATIENT
Start: 2024-11-18 | End: 2024-11-18 | Stop reason: HOSPADM

## 2024-11-18 RX ORDER — NALOXONE HYDROCHLORIDE 0.4 MG/ML
0.2 INJECTION, SOLUTION INTRAMUSCULAR; INTRAVENOUS; SUBCUTANEOUS EVERY 5 MIN PRN
Status: DISCONTINUED | OUTPATIENT
Start: 2024-11-18 | End: 2024-11-19 | Stop reason: HOSPADM

## 2024-11-18 RX ORDER — ASPIRIN 81 MG/1
81 TABLET ORAL 2 TIMES DAILY
Status: DISCONTINUED | OUTPATIENT
Start: 2024-11-18 | End: 2024-11-19 | Stop reason: HOSPADM

## 2024-11-18 RX ORDER — ROPIVACAINE/EPI/CLONIDINE/KET 2.46-0.005
SYRINGE (ML) INJECTION AS NEEDED
Status: DISCONTINUED | OUTPATIENT
Start: 2024-11-18 | End: 2024-11-18 | Stop reason: HOSPADM

## 2024-11-18 RX ORDER — SODIUM CHLORIDE, SODIUM LACTATE, POTASSIUM CHLORIDE, AND CALCIUM CHLORIDE .6; .31; .03; .02 G/100ML; G/100ML; G/100ML; G/100ML
IRRIGANT IRRIGATION AS NEEDED
Status: DISCONTINUED | OUTPATIENT
Start: 2024-11-18 | End: 2024-11-18 | Stop reason: HOSPADM

## 2024-11-18 RX ORDER — PANTOPRAZOLE SODIUM 40 MG/1
40 TABLET, DELAYED RELEASE ORAL
Status: DISCONTINUED | OUTPATIENT
Start: 2024-11-19 | End: 2024-11-19 | Stop reason: HOSPADM

## 2024-11-18 RX ORDER — PROPOFOL 10 MG/ML
INJECTION, EMULSION INTRAVENOUS CONTINUOUS PRN
Status: DISCONTINUED | OUTPATIENT
Start: 2024-11-18 | End: 2024-11-18

## 2024-11-18 RX ORDER — BISACODYL 5 MG
10 TABLET, DELAYED RELEASE (ENTERIC COATED) ORAL DAILY PRN
Status: DISCONTINUED | OUTPATIENT
Start: 2024-11-18 | End: 2024-11-19 | Stop reason: HOSPADM

## 2024-11-18 RX ORDER — HYDRALAZINE HYDROCHLORIDE 20 MG/ML
5 INJECTION INTRAMUSCULAR; INTRAVENOUS EVERY 30 MIN PRN
Status: DISCONTINUED | OUTPATIENT
Start: 2024-11-18 | End: 2024-11-18 | Stop reason: HOSPADM

## 2024-11-18 RX ORDER — DIPHENHYDRAMINE HCL 12.5MG/5ML
12.5 LIQUID (ML) ORAL EVERY 6 HOURS PRN
Status: DISCONTINUED | OUTPATIENT
Start: 2024-11-18 | End: 2024-11-19 | Stop reason: HOSPADM

## 2024-11-18 RX ORDER — CELECOXIB 200 MG/1
200 CAPSULE ORAL ONCE
Status: COMPLETED | OUTPATIENT
Start: 2024-11-18 | End: 2024-11-18

## 2024-11-18 RX ORDER — OXYCODONE HYDROCHLORIDE 5 MG/1
10 TABLET ORAL EVERY 4 HOURS PRN
Status: DISCONTINUED | OUTPATIENT
Start: 2024-11-18 | End: 2024-11-19 | Stop reason: HOSPADM

## 2024-11-18 RX ORDER — SCOLOPAMINE TRANSDERMAL SYSTEM 1 MG/1
1 PATCH, EXTENDED RELEASE TRANSDERMAL ONCE
Status: DISCONTINUED | OUTPATIENT
Start: 2024-11-18 | End: 2024-11-19 | Stop reason: HOSPADM

## 2024-11-18 RX ORDER — SODIUM CHLORIDE, SODIUM LACTATE, POTASSIUM CHLORIDE, CALCIUM CHLORIDE 600; 310; 30; 20 MG/100ML; MG/100ML; MG/100ML; MG/100ML
INJECTION, SOLUTION INTRAVENOUS CONTINUOUS PRN
Status: DISCONTINUED | OUTPATIENT
Start: 2024-11-18 | End: 2024-11-18

## 2024-11-18 RX ORDER — TRANEXAMIC ACID 650 MG/1
1950 TABLET ORAL ONCE
Status: COMPLETED | OUTPATIENT
Start: 2024-11-18 | End: 2024-11-18

## 2024-11-18 RX ORDER — DOCUSATE SODIUM 100 MG/1
100 CAPSULE, LIQUID FILLED ORAL 2 TIMES DAILY
Status: DISCONTINUED | OUTPATIENT
Start: 2024-11-18 | End: 2024-11-19 | Stop reason: HOSPADM

## 2024-11-18 SDOH — HEALTH STABILITY: MENTAL HEALTH: HOW OFTEN DO YOU HAVE SIX OR MORE DRINKS ON ONE OCCASION?: NEVER

## 2024-11-18 SDOH — ECONOMIC STABILITY: FOOD INSECURITY: WITHIN THE PAST 12 MONTHS, THE FOOD YOU BOUGHT JUST DIDN'T LAST AND YOU DIDN'T HAVE MONEY TO GET MORE.: PATIENT DECLINED

## 2024-11-18 SDOH — SOCIAL STABILITY: SOCIAL INSECURITY: DO YOU FEEL UNSAFE GOING BACK TO THE PLACE WHERE YOU ARE LIVING?: NO

## 2024-11-18 SDOH — ECONOMIC STABILITY: INCOME INSECURITY
IN THE PAST 12 MONTHS HAS THE ELECTRIC, GAS, OIL, OR WATER COMPANY THREATENED TO SHUT OFF SERVICES IN YOUR HOME?: PATIENT DECLINED

## 2024-11-18 SDOH — SOCIAL STABILITY: SOCIAL INSECURITY: DO YOU FEEL ANYONE HAS EXPLOITED OR TAKEN ADVANTAGE OF YOU FINANCIALLY OR OF YOUR PERSONAL PROPERTY?: NO

## 2024-11-18 SDOH — HEALTH STABILITY: MENTAL HEALTH: HOW OFTEN DO YOU HAVE A DRINK CONTAINING ALCOHOL?: MONTHLY OR LESS

## 2024-11-18 SDOH — HEALTH STABILITY: MENTAL HEALTH: HOW MANY DRINKS CONTAINING ALCOHOL DO YOU HAVE ON A TYPICAL DAY WHEN YOU ARE DRINKING?: 1 OR 2

## 2024-11-18 SDOH — ECONOMIC STABILITY: FOOD INSECURITY: WITHIN THE PAST 12 MONTHS, YOU WORRIED THAT YOUR FOOD WOULD RUN OUT BEFORE YOU GOT THE MONEY TO BUY MORE.: NEVER TRUE

## 2024-11-18 SDOH — ECONOMIC STABILITY: HOUSING INSECURITY: IN THE LAST 12 MONTHS, WAS THERE A TIME WHEN YOU WERE NOT ABLE TO PAY THE MORTGAGE OR RENT ON TIME?: NO

## 2024-11-18 SDOH — ECONOMIC STABILITY: FOOD INSECURITY: HOW HARD IS IT FOR YOU TO PAY FOR THE VERY BASICS LIKE FOOD, HOUSING, MEDICAL CARE, AND HEATING?: NOT VERY HARD

## 2024-11-18 SDOH — SOCIAL STABILITY: SOCIAL INSECURITY
WITHIN THE LAST YEAR, HAVE YOU BEEN KICKED, HIT, SLAPPED, OR OTHERWISE PHYSICALLY HURT BY YOUR PARTNER OR EX-PARTNER?: PATIENT DECLINED

## 2024-11-18 SDOH — ECONOMIC STABILITY: HOUSING INSECURITY: AT ANY TIME IN THE PAST 12 MONTHS, WERE YOU HOMELESS OR LIVING IN A SHELTER (INCLUDING NOW)?: NO

## 2024-11-18 SDOH — ECONOMIC STABILITY: TRANSPORTATION INSECURITY: IN THE PAST 12 MONTHS, HAS LACK OF TRANSPORTATION KEPT YOU FROM MEDICAL APPOINTMENTS OR FROM GETTING MEDICATIONS?: NO

## 2024-11-18 SDOH — SOCIAL STABILITY: SOCIAL INSECURITY: ARE THERE ANY APPARENT SIGNS OF INJURIES/BEHAVIORS THAT COULD BE RELATED TO ABUSE/NEGLECT?: NO

## 2024-11-18 SDOH — ECONOMIC STABILITY: FOOD INSECURITY: WITHIN THE PAST 12 MONTHS, THE FOOD YOU BOUGHT JUST DIDN'T LAST AND YOU DIDN'T HAVE MONEY TO GET MORE.: NEVER TRUE

## 2024-11-18 SDOH — SOCIAL STABILITY: SOCIAL INSECURITY: DOES ANYONE TRY TO KEEP YOU FROM HAVING/CONTACTING OTHER FRIENDS OR DOING THINGS OUTSIDE YOUR HOME?: NO

## 2024-11-18 SDOH — SOCIAL STABILITY: SOCIAL INSECURITY
WITHIN THE LAST YEAR, HAVE YOU BEEN RAPED OR FORCED TO HAVE ANY KIND OF SEXUAL ACTIVITY BY YOUR PARTNER OR EX-PARTNER?: PATIENT DECLINED

## 2024-11-18 SDOH — ECONOMIC STABILITY: FOOD INSECURITY
WITHIN THE PAST 12 MONTHS, YOU WORRIED THAT YOUR FOOD WOULD RUN OUT BEFORE YOU GOT THE MONEY TO BUY MORE.: PATIENT DECLINED

## 2024-11-18 SDOH — SOCIAL STABILITY: SOCIAL INSECURITY: ARE YOU MARRIED, WIDOWED, DIVORCED, SEPARATED, NEVER MARRIED, OR LIVING WITH A PARTNER?: NEVER MARRIED

## 2024-11-18 SDOH — ECONOMIC STABILITY: HOUSING INSECURITY: IN THE PAST 12 MONTHS, HOW MANY TIMES HAVE YOU MOVED WHERE YOU WERE LIVING?: 0

## 2024-11-18 SDOH — SOCIAL STABILITY: SOCIAL INSECURITY: ABUSE: ADULT

## 2024-11-18 SDOH — SOCIAL STABILITY: SOCIAL INSECURITY
WITHIN THE LAST YEAR, HAVE YOU BEEN HUMILIATED OR EMOTIONALLY ABUSED IN OTHER WAYS BY YOUR PARTNER OR EX-PARTNER?: PATIENT DECLINED

## 2024-11-18 SDOH — ECONOMIC STABILITY: INCOME INSECURITY: IN THE PAST 12 MONTHS HAS THE ELECTRIC, GAS, OIL, OR WATER COMPANY THREATENED TO SHUT OFF SERVICES IN YOUR HOME?: NO

## 2024-11-18 SDOH — ECONOMIC STABILITY: FOOD INSECURITY: HOW HARD IS IT FOR YOU TO PAY FOR THE VERY BASICS LIKE FOOD, HOUSING, MEDICAL CARE, AND HEATING?: NOT HARD AT ALL

## 2024-11-18 SDOH — SOCIAL STABILITY: SOCIAL INSECURITY: HAVE YOU HAD ANY THOUGHTS OF HARMING ANYONE ELSE?: NO

## 2024-11-18 SDOH — SOCIAL STABILITY: SOCIAL INSECURITY: HAS ANYONE EVER THREATENED TO HURT YOUR FAMILY OR YOUR PETS?: NO

## 2024-11-18 SDOH — SOCIAL STABILITY: SOCIAL INSECURITY: WERE YOU ABLE TO COMPLETE ALL THE BEHAVIORAL HEALTH SCREENINGS?: YES

## 2024-11-18 SDOH — SOCIAL STABILITY: SOCIAL INSECURITY: HAVE YOU HAD THOUGHTS OF HARMING ANYONE ELSE?: NO

## 2024-11-18 SDOH — SOCIAL STABILITY: SOCIAL INSECURITY: ARE YOU OR HAVE YOU BEEN THREATENED OR ABUSED PHYSICALLY, EMOTIONALLY, OR SEXUALLY BY ANYONE?: NO

## 2024-11-18 SDOH — ECONOMIC STABILITY: HOUSING INSECURITY: IN THE PAST 12 MONTHS, HOW MANY TIMES HAVE YOU MOVED WHERE YOU WERE LIVING?: 1

## 2024-11-18 SDOH — SOCIAL STABILITY: SOCIAL INSECURITY: WITHIN THE LAST YEAR, HAVE YOU BEEN AFRAID OF YOUR PARTNER OR EX-PARTNER?: PATIENT DECLINED

## 2024-11-18 ASSESSMENT — PAIN DESCRIPTION - ORIENTATION
ORIENTATION: RIGHT

## 2024-11-18 ASSESSMENT — PATIENT HEALTH QUESTIONNAIRE - PHQ9
1. LITTLE INTEREST OR PLEASURE IN DOING THINGS: NOT AT ALL
2. FEELING DOWN, DEPRESSED OR HOPELESS: NOT AT ALL
SUM OF ALL RESPONSES TO PHQ9 QUESTIONS 1 & 2: 0

## 2024-11-18 ASSESSMENT — COGNITIVE AND FUNCTIONAL STATUS - GENERAL
TOILETING: A LITTLE
PERSONAL GROOMING: A LITTLE
DRESSING REGULAR UPPER BODY CLOTHING: A LITTLE
TURNING FROM BACK TO SIDE WHILE IN FLAT BAD: A LITTLE
WALKING IN HOSPITAL ROOM: A LITTLE
TURNING FROM BACK TO SIDE WHILE IN FLAT BAD: A LITTLE
MOBILITY SCORE: 18
WALKING IN HOSPITAL ROOM: A LITTLE
CLIMB 3 TO 5 STEPS WITH RAILING: A LITTLE
TURNING FROM BACK TO SIDE WHILE IN FLAT BAD: A LITTLE
MOVING FROM LYING ON BACK TO SITTING ON SIDE OF FLAT BED WITH BEDRAILS: A LITTLE
PATIENT BASELINE BEDBOUND: NO
HELP NEEDED FOR BATHING: A LITTLE
STANDING UP FROM CHAIR USING ARMS: A LITTLE
EATING MEALS: A LITTLE
MOVING FROM LYING ON BACK TO SITTING ON SIDE OF FLAT BED WITH BEDRAILS: A LITTLE
EATING MEALS: A LITTLE
STANDING UP FROM CHAIR USING ARMS: A LITTLE
MOBILITY SCORE: 18
DAILY ACTIVITIY SCORE: 18
HELP NEEDED FOR BATHING: A LITTLE
MOVING FROM LYING ON BACK TO SITTING ON SIDE OF FLAT BED WITH BEDRAILS: A LITTLE
MOBILITY SCORE: 16
TOILETING: A LITTLE
TURNING FROM BACK TO SIDE WHILE IN FLAT BAD: A LITTLE
CLIMB 3 TO 5 STEPS WITH RAILING: A LITTLE
EATING MEALS: A LITTLE
DRESSING REGULAR UPPER BODY CLOTHING: A LITTLE
HELP NEEDED FOR BATHING: A LITTLE
TURNING FROM BACK TO SIDE WHILE IN FLAT BAD: A LITTLE
DAILY ACTIVITIY SCORE: 18
MOVING TO AND FROM BED TO CHAIR: A LITTLE
HELP NEEDED FOR BATHING: A LITTLE
WALKING IN HOSPITAL ROOM: A LITTLE
PERSONAL GROOMING: A LITTLE
DRESSING REGULAR LOWER BODY CLOTHING: A LITTLE
CLIMB 3 TO 5 STEPS WITH RAILING: TOTAL
MOVING TO AND FROM BED TO CHAIR: A LITTLE
MOVING FROM LYING ON BACK TO SITTING ON SIDE OF FLAT BED WITH BEDRAILS: A LITTLE
PERSONAL GROOMING: A LITTLE
DAILY ACTIVITIY SCORE: 18
TOILETING: A LITTLE
CLIMB 3 TO 5 STEPS WITH RAILING: A LITTLE
MOVING TO AND FROM BED TO CHAIR: A LITTLE
TOILETING: A LITTLE
DAILY ACTIVITIY SCORE: 18
DRESSING REGULAR UPPER BODY CLOTHING: A LITTLE
STANDING UP FROM CHAIR USING ARMS: A LITTLE
STANDING UP FROM CHAIR USING ARMS: A LITTLE
WALKING IN HOSPITAL ROOM: A LITTLE
DRESSING REGULAR LOWER BODY CLOTHING: A LITTLE
MOBILITY SCORE: 18
MOBILITY SCORE: 18
STANDING UP FROM CHAIR USING ARMS: A LITTLE
DRESSING REGULAR LOWER BODY CLOTHING: A LITTLE
PERSONAL GROOMING: A LITTLE
CLIMB 3 TO 5 STEPS WITH RAILING: A LITTLE
DRESSING REGULAR UPPER BODY CLOTHING: A LITTLE
WALKING IN HOSPITAL ROOM: A LITTLE
MOVING TO AND FROM BED TO CHAIR: A LITTLE
DRESSING REGULAR LOWER BODY CLOTHING: A LITTLE
MOVING FROM LYING ON BACK TO SITTING ON SIDE OF FLAT BED WITH BEDRAILS: A LITTLE
MOVING TO AND FROM BED TO CHAIR: A LITTLE
EATING MEALS: A LITTLE

## 2024-11-18 ASSESSMENT — PAIN SCALES - GENERAL
PAINLEVEL_OUTOF10: 5 - MODERATE PAIN
PAINLEVEL_OUTOF10: 3
PAINLEVEL_OUTOF10: 0 - NO PAIN
PAINLEVEL_OUTOF10: 0 - NO PAIN
PAINLEVEL_OUTOF10: 9
PAINLEVEL_OUTOF10: 9
PAINLEVEL_OUTOF10: 0 - NO PAIN
PAINLEVEL_OUTOF10: 7
PAINLEVEL_OUTOF10: 5 - MODERATE PAIN
PAINLEVEL_OUTOF10: 5 - MODERATE PAIN

## 2024-11-18 ASSESSMENT — PAIN DESCRIPTION - LOCATION
LOCATION: HIP

## 2024-11-18 ASSESSMENT — ACTIVITIES OF DAILY LIVING (ADL)
ADEQUATE_TO_COMPLETE_ADL: YES
LACK_OF_TRANSPORTATION: NO
WALKS IN HOME: INDEPENDENT
HEARING - RIGHT EAR: FUNCTIONAL
LACK_OF_TRANSPORTATION: NO
TOILETING: INDEPENDENT
HEARING - LEFT EAR: FUNCTIONAL
GROOMING: INDEPENDENT
PATIENT'S MEMORY ADEQUATE TO SAFELY COMPLETE DAILY ACTIVITIES?: YES
ADL_ASSISTANCE: INDEPENDENT
LACK_OF_TRANSPORTATION: NO
JUDGMENT_ADEQUATE_SAFELY_COMPLETE_DAILY_ACTIVITIES: YES
FEEDING YOURSELF: INDEPENDENT
BATHING: INDEPENDENT
DRESSING YOURSELF: INDEPENDENT

## 2024-11-18 ASSESSMENT — LIFESTYLE VARIABLES
AUDIT-C TOTAL SCORE: 1
HOW OFTEN DO YOU HAVE A DRINK CONTAINING ALCOHOL: MONTHLY OR LESS
SKIP TO QUESTIONS 9-10: 1
HOW OFTEN DO YOU HAVE 6 OR MORE DRINKS ON ONE OCCASION: LESS THAN MONTHLY
HOW MANY STANDARD DRINKS CONTAINING ALCOHOL DO YOU HAVE ON A TYPICAL DAY: 1 OR 2
AUDIT-C TOTAL SCORE: 2
SKIP TO QUESTIONS 9-10: 0
AUDIT-C TOTAL SCORE: 2

## 2024-11-18 ASSESSMENT — PAIN - FUNCTIONAL ASSESSMENT
PAIN_FUNCTIONAL_ASSESSMENT: 0-10
PAIN_FUNCTIONAL_ASSESSMENT: 0-10
PAIN_FUNCTIONAL_ASSESSMENT: UNABLE TO SELF-REPORT
PAIN_FUNCTIONAL_ASSESSMENT: 0-10
PAIN_FUNCTIONAL_ASSESSMENT: 0-10
PAIN_FUNCTIONAL_ASSESSMENT: UNABLE TO SELF-REPORT
PAIN_FUNCTIONAL_ASSESSMENT: 0-10

## 2024-11-18 NOTE — PROGRESS NOTES
Physical Therapy    Physical Therapy Evaluation & Treatment    Patient Name: Fauzia Patel  MRN: [id number]  Department: [place] [address]  Room: [address] [place] [address]  Today's Date: 11/18/2024   Time Calculation  Start Time: 1350  Stop Time: 1417  Time Calculation (min): 27 min    Assessment/Plan   PT Assessment  PT Assessment Results: Decreased strength, Decreased endurance, Impaired balance, Decreased mobility, Decreased range of motion, Pain, Orthopedic restrictions  Rehab Prognosis: Good  Evaluation/Treatment Tolerance: Patient tolerated treatment well  Medical Staff Made Aware: Yes  Strengths: Ability to acquire knowledge, Insight into problems, Physical health, Premorbid level of function, Support and attitude of living partners  Barriers to Participation: Comorbidities  End of Session Communication: Bedside nurse  Assessment Comment: Pt is POD #0  s/p R MESSI with posterior total hip precautions and WBAT. The pt presents with decreased safety and decreased independence with bed mobility, transfers, and gait. The pt is also unable to ascend/descend stairs this session. Contributing to these impairment are post-op pain, decreased bilateral knee strength, decreased balance, and poor stability. The pt would benefit from continued PT to assess progress and further therapy needs, address the above functional limitations and impairments to improve independence and safety and allow for an anticipated d/c with low intensity PT and assist PRN once medically stable at D/C.  End of Session Patient Position: Up in chair, Alarm on   IP OR SWING BED PT PLAN  Inpatient or Swing Bed: Inpatient  PT Plan  Treatment/Interventions: Bed mobility, Transfer training, Gait training, Stair training, Balance training, Neuromuscular re-education, Strengthening, Endurance training, Therapeutic exercise, Therapeutic activity, Home exercise program  PT Plan: Ongoing PT  PT Frequency: BID  PT Discharge Recommendations: Low intensity level of continued  care  Equipment Recommended upon Discharge: Wheeled walker (needs vended prior to DC)  PT Recommended Transfer Status: Contact guard  PT - OK to Discharge: Yes (PT POC initiated this date)      Subjective     General Visit Information:  General  Reason for Referral: Pt is a 30 yo female POD #1 s/p R MESSI (posterior)  Referred By: Dr. Bay Rios  Past Medical History Relevant to Rehab:   Past Medical History:   Diagnosis Date    Anxiety     Depression     PTSD (post-traumatic stress disorder)        Family/Caregiver Present: Yes  Caregiver Feedback: friend present and receptive  Prior to Session Communication: Bedside nurse  Patient Position Received: Bed, 3 rail up, Alarm on  General Comment: Pt cleared for PT evaluation by primary RN. Pt pleasant and agreeable to PT evaluation. Time between subjective and objective portion of evaluation due to need for Xray of R hip prior to mobility. Cleared by RN/ortho team prior to mobility  Home Living:  Home Living  Type of Home: House  Lives With:  (sister)  Home Adaptive Equipment: None (Reports ex-boyfriend lost provided FWW and hip kit - needed at DC)  Home Layout: One level (1 step up to bathroom in home (able to go to separate bathroom if needed))  Home Access: Level entry  Bathroom Shower/Tub: Tub/shower unit, Walk-in shower  Bathroom Toilet: Standard  Bathroom Equipment: None  Prior Level of Function:  Prior Function Per Pt/Caregiver Report  Level of [place]: Independent with ADLs and functional transfers, Independent with homemaking with ambulation  ADL Assistance: Independent  Homemaking Assistance: Independent  Ambulatory Assistance: Independent (no device)  Vocational:  ()  Precautions:  Precautions  LE Weight Bearing Status: Weight Bearing as Tolerated (R)  Medical Precautions: Fall precautions  Post-Surgical Precautions: Right hip precautions (posterior)    Vital Signs (Past 2hrs)                Objective   Pain:  Pain Assessment  Pain  Assessment: 0-10  0-10 (Numeric) Pain Score: 0 - No pain  Pain Interventions: Cold applied, Repositioned, Ambulation/increased activity  Cognition:  Cognition  Overall Cognitive Status: Within Functional Limits  Orientation Level: Oriented X4  Attention: Within Functional Limits  Memory: Within Funtional Limits  Insight: Within function limits  Impulsive: Within functional limits    General Assessments:  Activity Tolerance  Endurance: Tolerates 30 min exercise with multiple rests    Sensation  Light Touch: No apparent deficits    Coordination  Movements are Fluid and Coordinated: Yes    Static Sitting Balance  Static Sitting-Balance Support: No upper extremity supported  Static Sitting-Level of Assistance: Distant supervision    Static Standing Balance  Static Standing-Balance Support: Bilateral upper extremity supported  Static Standing-Level of Assistance: Contact guard  Functional Assessments:  Bed Mobility  Bed Mobility: Yes  Bed Mobility 1  Bed Mobility 1: Supine to sitting, Sitting to supine  Level of Assistance 1: Minimum assistance  Bed Mobility Comments 1: Assist Scooting hips anteriorly at EOB    Transfers  Transfer: Yes  Transfer 1  Transfer From 1: Stand to  Transfer to 1: Sit  Technique 1: Sit to stand, Stand to sit  Transfer Device 1: Walker, Gait belt  Transfer Level of Assistance 1: Minimum assistance  Trials/Comments 1: Assist to elevate into standing. VCs for hand placement/sequencing    Ambulation/Gait Training  Ambulation/Gait Training Performed: Yes  Ambulation/Gait Training 1  Surface 1: Level tile  Device 1: Rolling walker  Gait Support Devices: Gait belt  Assistance 1: Contact guard  Quality of Gait 1:  (Significant R antalgic gait pattern. Reduced B step length and gait taya)  Comments/Distance (ft) 1: x8ft  Extremity/Trunk Assessments:  RLE   RLE : Exceptions to WFL (grossly 2/5 at hip, 3/5 at knee and 4/5 at ankle)  LLE   LLE : Within Functional Limits  Treatments:  Therapeutic  Exercise  Therapeutic Exercise Performed: Yes  Therapeutic Exercise Activity 1: Instructed in x10 reps of R posterior MESSI HEP including x10 reps ankle pumps, quad sets, glute sets, SAQ, heel slides and hip abd/add  Outcome Measures:  [facility] Basic Mobility  Turning from your back to your side while in a flat bed without using bedrails: A little  Moving from lying on your back to sitting on the side of a flat bed without using bedrails: A little  Moving to and from bed to chair (including a wheelchair): A little  Standing up from a chair using your arms (e.g. wheelchair or bedside chair): A little  To walk in hospital room: A little  Climbing 3-5 steps with railing: Total  Basic Mobility - Total Score: 16    Encounter Problems       Encounter Problems (Active)       Mobility       STG - Patient will ambulate       Start:  11/18/24    Expected End:  12/02/24       >150ft using FWW Mod Ind         STG - Patient will ambulate up and down a curb/step       Start:  11/18/24    Expected End:  12/02/24       Using FWW SUP on step curb            PT Transfers       STG - Patient will perform bed mobility       Start:  11/18/24    Expected End:  12/02/24       Mod Ind         STG - Patient will transfer sit to and from stand       Start:  11/18/24    Expected End:  12/02/24       Mod Ind         HEP       Start:  11/18/24    Expected End:  12/02/24       Pt will complete R posterior MESSI HEP with SUP                Education Documentation  Handouts, taught by Krystian Sosa, PT at 11/18/2024  2:58 PM.  Learner: Patient  Readiness: Acceptance  Method: Explanation, Demonstration, Handout  Response: Verbalizes Understanding    Home Exercise Program, taught by Krystian Sosa, PT at 11/18/2024  2:58 PM.  Learner: Patient  Readiness: Acceptance  Method: Explanation, Demonstration, Handout  Response: Verbalizes Understanding    Precautions, taught by Krystian Sosa PT at 11/18/2024  2:58 PM.  Learner:  Patient  Readiness: Acceptance  Method: Explanation, Demonstration, Handout  Response: Verbalizes Understanding    Body Mechanics, taught by Krystian Ssoa, PT at 11/18/2024  2:58 PM.  Learner: Patient  Readiness: Acceptance  Method: Explanation, Demonstration, Handout  Response: Verbalizes Understanding    Mobility Training, taught by Krystian Sosa, PT at 11/18/2024  2:58 PM.  Learner: Patient  Readiness: Acceptance  Method: Explanation, Demonstration, Handout  Response: Verbalizes Understanding    Education Comments  No comments found.

## 2024-11-18 NOTE — PROGRESS NOTES
11/18/24 1440   Discharge Planning   Living Arrangements Family members   Support Systems Family members;Friends/neighbors   Assistance Needed none   Type of Residence Private residence   Number of Stairs to Enter Residence 0   Number of Stairs Within Residence 1   Do you have animals or pets at home? No   Who is requesting discharge planning? Provider   Home or Post Acute Services In home services   Type of Home Care Services Home OT;Home PT   Expected Discharge Disposition Home H   Does the patient need discharge transport arranged? No   Financial Resource Strain   How hard is it for you to pay for the very basics like food, housing, medical care, and heating? Not hard   Housing Stability   In the last 12 months, was there a time when you were not able to pay the mortgage or rent on time? N   In the past 12 months, how many times have you moved where you were living? 0   At any time in the past 12 months, were you homeless or living in a shelter (including now)? N   Transportation Needs   In the past 12 months, has lack of transportation kept you from medical appointments or from getting medications? no   In the past 12 months, has lack of transportation kept you from meetings, work, or from getting things needed for daily living? No   Patient Choice   Provider Choice list and CMS website (https://medicare.gov/care-compare#search) for post-acute Quality and Resource Measure Data were provided and reviewed with: Patient   Patient / Family choosing to utilize agency / facility established prior to hospitalization No     11/18/24 1441  Met with patient at bedside to discuss discharge planning. Patient lives at home with her sister in a house.  She is independent with ADLs and drives at baseline.  She uses a cane for ambulation.  She needs a walker for discharge.  Order placed.  She is agreeable to HHC. Explained HHC options and expectations.  She would like to use [facility].  She denies any additional HHC, DME, or  discharge needs. ADOD tomorrow.  Pushpa Melara RN [facility]

## 2024-11-18 NOTE — ANESTHESIA PROCEDURE NOTES
Airway  Date/Time: 11/18/2024 8:22 AM  Urgency: elective    Airway not difficult    Staffing  Performed: MARICHUY   Authorized by: David Elizalde MD    Performed by: ANDREW Goodwin  Patient location during procedure: OR    Indications and Patient Condition  Indications for airway management: anesthesia  Spontaneous Ventilation: absent  Sedation level: deep  Preoxygenated: yes  Patient position: sniffing  Mask difficulty assessment: 1 - vent by mask    Final Airway Details  Final airway type: endotracheal airway      Successful airway: ETT  Cuffed: yes   Successful intubation technique: direct laryngoscopy  Facilitating devices/methods: intubating stylet and cricoid pressure  Endotracheal tube insertion site: oral  Blade: Zac  Blade size: #3  ETT size (mm): 7.5  Cormack-Lehane Classification: grade I - full view of glottis  Placement verified by: chest auscultation and capnometry   Measured from: lips  ETT to lips (cm): 22  Number of attempts at approach: 1

## 2024-11-18 NOTE — HOSPITAL COURSE
29 year-old female who presented with right hip post-traumatic arthritis after a femoral neck fracture at age 16. Patient is now s/p R hip removal of hardware and conversion to MESSI on 11/18 by Dr. Rios. On the day of surgery, patient was identified in the pre-operative holding area and agreeable to proceed with surgery. Written consent was obtained.  Please see operative note for further details of this procedure. Patient received 24 hours of germaine-operative antibiotics. Patient recovered in the PACU before transfer to a regular nursing floor. Patient was started on oxycodone, tylenol, and dilaudid for pain control and ASA 81 mg bid for DVT prophylaxis. Physical therapy recommended continued recovery at home with continued physical therapy and wound care. On the day of discharge, patient was afebrile with stable vital signs. Patient was neurovascularly intact at time of discharge. Patient was discharged with prescription of ASA 81 mg bid for DVT prophylaxis for 4 weeks. Patient will follow-up with Dr. Rios in 2 weeks for post-operative visit.

## 2024-11-18 NOTE — CARE PLAN
The patient's goals for the shift include      The clinical goals for the shift include to feel better and pain control    Over the shift, the patient did not make progress toward the following goals.   Patient pain controlled with prn medications. Patient up with PT. No complaints noted at this time  Problem: Pain  Goal: Takes deep breaths with improved pain control throughout the shift  Outcome: Progressing  Goal: Turns in bed with improved pain control throughout the shift  Outcome: Progressing  Goal: Walks with improved pain control throughout the shift  Outcome: Progressing  Goal: Performs ADL's with improved pain control throughout shift  Outcome: Progressing  Goal: Participates in PT with improved pain control throughout the shift  Outcome: Progressing  Goal: Free from opioid side effects throughout the shift  Outcome: Progressing  Goal: Free from acute confusion related to pain meds throughout the shift  Outcome: Progressing

## 2024-11-18 NOTE — ANESTHESIA POSTPROCEDURE EVALUATION
Patient: Fauzia Patel    Procedure Summary       Date: 11/18/24 Room / Location: [facility] OR 11 / Virtual [facility] A OR    Anesthesia Start: 0802 Anesthesia Stop: 1152    Procedure: Right Hip Total Arthroplasty ~ Posterior Approach (Right: Hip) Diagnosis:       Primary osteoarthritis of right hip      (Primary osteoarthritis of right hip [M16.11])    Surgeons: Bay Rios MD Responsible Provider: David Elizalde MD    Anesthesia Type: general ASA Status: 2            Anesthesia Type: general    Vitals Value Taken Time   /74 11/18/24 1315   Temp 36.3 °C (97.3 °F) 11/18/24 1146   Pulse 85 11/18/24 1315   Resp 18 11/18/24 1315   SpO2 98 % 11/18/24 1315       Anesthesia Post Evaluation    Patient participation: complete - patient participated  Level of consciousness: awake  Pain management: satisfactory to patient  Airway patency: patent  Cardiovascular status: acceptable and hemodynamically stable  Respiratory status: acceptable and nonlabored ventilation  Hydration status: balanced  Postoperative Nausea and Vomiting: none        No notable events documented.

## 2024-11-18 NOTE — OP NOTE
Right Hip Total Arthroplasty ~ Posterior Approach (R) Operative Note     Date: 2024  OR Location: [facility] A OR    Name: Fauzia Patel, : 1995, Age: 29 y.o., MRN: [id number], Sex: female    Diagnosis  Pre-op Diagnosis      * Primary osteoarthritis of right hip [M16.11] Post-op Diagnosis     * Primary osteoarthritis of right hip [M16.11]     Procedures  46719 - Conversion to total hip arthroplasty  25697 - Excision of heterotopic bone, right hip     Surgeons      * Bay Rios - Primary    Resident/Fellow/Other Assistant:  Surgeons and Role:     * Josesito Mckeon MD - Resident - Assisting     * BAL Yoo-C - LUX First Assist    The patient's surgery was assisted by Lauren SELBY, due to lack of availability of a qualified resident to assist with the surgery. Lauren SELBY was present for the essential parts of the procedure. She acted as first assistant and assisted with preparing the extremity, draping the extremity, exposing the hip, retracting and manipulating the hip during surgery, facilitating safe performance of the procedure, and closure of the wound.     Staff:   Circulator: Artem  Scrub Person: Azeem  Scrub Person: Gilbert  Scrub Person: Camron Daley Scrub: Franchesca    Anesthesia Staff: Anesthesiologist: David Elizalde MD  C-AA: ANDREW Goodwin; ANDREW Matthews    Procedure Summary  Anesthesia: General  ASA: II  Estimated Blood Loss: 500mL  Intra-op Medications:   Administrations occurring from 0730 to 1000 on 24:   Medication Name Total Dose   lactated Ringer's irrigation solution 2,000 mL   ceFAZolin (Ancef) vial 1 g 2 g   dexAMETHasone (Decadron) injection 4 mg/mL 4 mg   fentaNYL (Sublimaze) injection 50 mcg/mL 50 mcg   HYDROmorphone (Dilaudid) injection 1 mg/mL 1 mg   lactated Ringer's infusion Cannot be calculated   lidocaine (Xylocaine) injection 2 % 100 mg   midazolam (Versed) injection 1 mg/mL 5 mg   propofol (Diprivan) injection 10 mg/mL 477.16 mg    rocuronium (ZeMuron) 50 mg/5 mL injection 100 mg   tranexamic acid (Cyklokapron) injection 1,000 mg              Anesthesia Record               Intraprocedure I/O Totals          Intake    Tranexamic Acid 0.00 mL    The total shown is the total volume documented since Anesthesia Start was filed.    Total Intake 0 mL       Output    Est. Blood Loss 800 mL    Total Output 800 mL       Net    Net Volume -800 mL          Specimen: No specimens collected              Drains and/or Catheters: * None in log *    Tourniquet Times:         Implants:  Implants       Type Name Action Serial No.      Other EMPHASYS ACETABULAR SHELL MULTI-HOLE 50MM CEMENTLESS Implanted N/A     Screw SCREW CANCELLOUS 6.5 X 20 - SN/A - [id number] Implanted N/A     Screw SCREW CANCELLOUS 6.5 X 25 - SN/A - [id number] Implanted N/A     Orthopedic Implant EMPHASYS POLYETHYLENE LINER ELEVATED RIM AOX 50mm 36mm Implanted N/A     Joint Hip STEM, ACTIS COLLAR, HIGH, SIZE 7 - SN/A - [id number] Implanted N/A     Joint Hip FEMORAL HEAD, CERAMIC 36 +8.5 - SN/A - [id number] Implanted N/A              Findings: Abundant heterotopic bone anteriorly which was causing impingement in adduction and flexion.  Significant DJD.  Bony overgrowth over the cortical screws.  Morbid obesity.    Implants:  Depuy Ackerly Multihole Emphasys Shell 50 OD   Depuy Actis Hi offset, size 7  Emphasys elevated lipped liner with a 36 mm ID   Biolox Delta Ceramic 36 OD, +8.5, 12/14 taper   Bone screws x 2 (25 and 20 mm)      Complications:  None.    Position: Lateral Decubitus    Medications: Ancef, TXA    History and indications:  The patient is a 29 y.o. y.o. female with endstage DJD of the hip. The patient has failed conservative measures and has elected to proceed with total hip arthroplasty. We discussed in detail the risks the benefits and the alternatives to surgery. The patient understands the risks would include but are not limited to infection, fracture, dislocation,  stiffness, leg length inequality, chronic pain, disability, wear, loosening, reaction to implanted materials, DVT, PE, MI, stroke, loss of limb, loss of life, or potential need for further surgery. The patient understands these risks and has elected to proceed.      Procedure:  The patient was properly identified in the holding area using name, medical record number, and date of birth. Informed consent was then signed and the operative extremity was marked. Next, the patient was brought back to the operating room. General was initiated without difficulty. The patient was placed in a well-padded lateral decubitus position on the table, all bony prominences were well padded, and the operative lower extremity was prepped and draped in normal sterile fashion. The patient did receive pre-operative antibiotics.  A preprocedural timeout was then performed once again confirming the patient's correct identity, correct procedure, correct side, and correct site. In addition, allergy status and required equipment were reviewed. Three independent members of the operating room team agreed on the above-mentioned parameters.      A skin incision was made centered over the greater trochanter. Electrocautery was used to coagulate vessels. Dissection was then carried down to the level of the fascia. The fascia was then incised along its axis. Charnley retractors were placed. The leg was internally rotated. The piriformis tendon was identified and dissected free from its insertion and then tagged. The remainder of the short external rotators and the capsule was removed in a single layer and tagged with #5 Ethibond suture.  Once the capsule was sufficiently released, the hip was dislocated.     Once the hip was dislocated, was relocated.  We then dissected through the vastus lateralis in order to identify the cannulated screws.  I was able to identify 1 screw easily.  The other 2 were buried under bone.  We were able to remove the  screws with some difficulty as the last screw was stripped.  However, we able to do so with a rongeur.    It was then again dislocated.    Dissection was carefully carried down to the level of the lesser trochanter. Retractors were carefully placed around the neck. The neck was cut at the templated length. The neck cut was measured off of the level of the lesser trochanter to correspond to the templated neck cut. An oscillating saw was used to perform the neck cut, and the head was placed on the back table.       Acetabular retractors were carefully placed around the acetabulum.  It was difficult to place the anterior femoral retractor due to the heterotopic bone.  I did have to place the retractor anterior to the heterotopic bone.  I was mindful of the femoral artery and neurovascular bundle.  However, it was very difficult to place that retractor.  I tried to take down some of the heterotopic bone initially but eventually, had to place the retractor on top of the heterotopic bone.  A labrectomy was performed. The soft tissue was removed from the cotyloid fossa. Reaming was then carried out first to medialize, and then in the appropriate direction until appropriate fit was obtained.  The patient was quite stiff due to her abundant scar tissue and heterotopic bone.  This limited my ability to deliver the femur anteriorly.  The cup was not as anteverted as it would have liked.  The acetabulum was irrigated. The final acetabular component was impacted into place in the appropriate position. Screws were used for additional fixation.  Trial acetabular liner was impacted in place. Impinging structures anteriorly and osteophytes were subsequently then removed.      Attention was then turned towards the femur. Soft tissue was removed from the lateral aspect of the neck cut. A box osteotome and canal finder were used to gain access to the canal. A lateralizing rasp was used to ensure that I was lateral. Sequential broaching  was then carried up to a size 5. This was found to be rotationally stable. Trial head and neck were placed on the broach. The hip was reduced and then taken through a range of motion.     The hip was extremely unstable.  Therefore, I broached up to a size 6 which lengthen the lower extremity.  We were then able to reduce.  The patient was unstable with flexion, adduction and internal rotation.  The position of sleep.  Therefore, we opted to utilize a lipped liner.  With a lipped liner, the patient was very stable in all planes of motion.    The trial liner was removed and a lipped liner was impacted.  The lip was located posterior superiorly at approximately 10:00 on the clock face.    When I went to remove the trial broach, there is some micromotion.  Therefore, I opted to broach up to a size 7.  We then inserted the size 7 high offset stem.    With the 8.5 mm head in place, I was able to reduce the hip although was very difficult.  I would then attempted to take the patient through stability testing.  It was at this juncture that I did notice that there was some heterotopic bone additionally and that 5 o'clock position that I had not noticed previously.  I felt that that was impinging.  I was able to remove that while working around the stem.    1 g of vancomycin powder was introduced.  The posterior capsule was approximated to the posterior aspect of the femur through drill holes.    The fascia was closed primarily. Tissues were then injected with a local anesthetic. The skin was then closed in multiple layers with the superficial layer closed with Monocryl and skin glue. The hip was dressed sterilely. The patient was taken to the recovery room in stable condition.      I attest that I was present and scrubbed for all key portions of this case. I supervised the entire case.     Complications:  None; patient tolerated the procedure well.    Disposition: PACU - hemodynamically stable.  Condition: stable      Additional Details: I am assigning a 22 modifier to this case.  The patient's morbid obesity and heterotopic bone made a very difficult to expose the case.  This also led directly to increased blood loss.  Her morbid obesity directly contributed to decreased visualization.    Attending Attestation: I was present and scrubbed for the key portions of the procedure.    Bay Rios  Phone Number: [phone]

## 2024-11-18 NOTE — ANESTHESIA PREPROCEDURE EVALUATION
Patient: Fauzia Patel    Procedure Information       Anesthesia Start Date/Time: 11/18/24 0708    Procedure: Right Hip Total Arthroplasty ~ Posterior Approach (Right: Hip)    Location: [facility] A OR 11 / Virtual [facility] A OR    Surgeons: Bay Rios MD          30 yo F hx chronic pain, prior hip surgery, anxiety, PTSD, THC use,  prior cocaine use (recent urine tx neg; has been cancelled twice in past).  Neg HCG    Relevant Problems   Endocrine   (+) Obesity      Musculoskeletal   (+) Primary osteoarthritis of right hip       Clinical information reviewed:   Tobacco  Allergies  Meds   Med Hx  Surg Hx  OB Status  Fam Hx  Soc   Hx        NPO Detail:  NPO/Void Status  Carbohydrate Drink Given Prior to Surgery? : N  Date of Last Liquid: 11/18/24  Time of Last Liquid: 0600 (one sip in waiting room)  Date of Last Solid: 11/17/24  Time of Last Solid: 2300  Last Intake Type: Clear fluids  Time of Last Void: 0626         Physical Exam    Airway  Mallampati: III  TM distance: >3 FB  Neck ROM: full     Cardiovascular   Rate: normal     Dental - normal exam     Pulmonary - normal exam     Abdominal            Anesthesia Plan    History of general anesthesia?: yes  History of complications of general anesthesia?: no    ASA 2     general   (Discussed with surgeon, will plan for general given that case is likely more complicated.  )  intravenous induction   Postoperative administration of opioids is intended.  Anesthetic plan and risks discussed with patient.

## 2024-11-18 NOTE — PROGRESS NOTES
"Orthopaedic Surgery Progress Note    Subjective:  Patient tolerated procedure well without complications. Resting comfortably in bed. Denies calf pain, CP, SOB, N/T, fever or chills.     Objective:  /83   Pulse 78   Temp 36.6 °C (97.9 °F) (Temporal)   Resp 18   Ht 1.727 m (5' 8\")   Wt 107 kg (235 lb 10.8 oz)   SpO2 94%   BMI 35.83 kg/m²     Gen: arousable, NAD, appropriately conversational  Cardiac: RRR to peripheral palpation  Resp: nonlabored on RA  GI: soft, non-distended  MSK:  RLE:   - postoperative mepilex in place without strikethrough  - appropriately warm, tender postoperative extremity   - Motor intact in DF/PF/EHL/FHL  - SILT in saph/sural/SPN/DPN distributions  - Foot wwp, 2+ DP/PT pulse, brisk cap refill  - Compartments soft and compressible, no pain with passive dorsiflexion      Results for orders placed or performed during the hospital encounter of 11/18/24 (from the past 24 hours)   POCT pregnancy, urine   Result Value Ref Range    Preg Test, Ur Negative Negative       XR pelvis 1-2 views   Final Result   Immediately status post  right hip arthroplasty. No acute fracture or   dislocation.        MACRO:   None        Signed by: Alex Thompson 11/18/2024 2:34 PM   Dictation workstation:   [id number]      XR femur right 1 view   Final Result   Immediately status post  right hip arthroplasty. No acute fracture or   dislocation.        MACRO:   None        Signed by: Alex Thompson 11/18/2024 2:34 PM   Dictation workstation:   [id number]      XR pelvis 1-2 views   Final Result   Ongoing right hip arthroplasty.        MACRO:   None        Signed by: Alex Thompson 11/18/2024 11:04 AM   Dictation workstation:   [id number]          Assessment/Plan: 29 y.o. female s/p R hip CLAUDE, conversion to R MESSI on 11/18 with Dr. Shahbaz Schneider.      Plan:  - Weight bearing: WBAT w/ posterior hip precautions  - DVT ppx: SCDs, ASA 81 mg BID to start POD1  - Diet: Regular as tolerated   - Protonix 40 mg daily  - " Pain: Tylenol, oxycodone 5/10, dilaudid breakthrough  - Antibiotics: perioperative ancef 2g q8hr x3 doses  - FEN: mIVF LR @ 100cc/hr; HLIV with good PO intake; monitor BMP  - Lines: maintain PIVx2 while inpatient  - Bowel Regimen: miralax, germaine-colace  - PT/OT: consulted  - Pulm: Encourage IS, maintain O2 sat >92%  - Cardiac: no issues  - Glycemic: no issues  - Continue home medications   - Buspar 10 mg daily/PRN  - Gifford: none   - Drains: none     Dispo: Pending PT/OT, pain control, anticipate home w/ [facility] 11/19 vs 11/20    Josesito Mckeon MD, MD  Orthopedic Surgery PGY-2  [facility]  Available by Epic Chat    While inpatient, this patient will be followed by the Orthopaedic Trauma team. Please see contact information below:    [facility]  Alex Chery MD PGY1  Josesito Mckeon MD PGY2  Ildefonso Garrett MD PGY4    Please page [phone] (ortho on-call) after 6pm and on weekends.

## 2024-11-19 ENCOUNTER — PHARMACY VISIT (OUTPATIENT)
Dept: PHARMACY | Facility: CLINIC | Age: 29
End: 2024-11-19
Payer: MEDICAID

## 2024-11-19 ENCOUNTER — DOCUMENTATION (OUTPATIENT)
Dept: HOME HEALTH SERVICES | Facility: HOME HEALTH | Age: 29
End: 2024-11-19
Payer: MEDICAID

## 2024-11-19 VITALS
HEART RATE: 78 BPM | WEIGHT: 235.67 LBS | BODY MASS INDEX: 35.72 KG/M2 | TEMPERATURE: 97.3 F | SYSTOLIC BLOOD PRESSURE: 105 MMHG | RESPIRATION RATE: 17 BRPM | DIASTOLIC BLOOD PRESSURE: 62 MMHG | OXYGEN SATURATION: 95 % | HEIGHT: 68 IN

## 2024-11-19 LAB
ANION GAP SERPL CALC-SCNC: 10 MMOL/L (ref 10–20)
BUN SERPL-MCNC: 10 MG/DL (ref 6–23)
CALCIUM SERPL-MCNC: 8.2 MG/DL (ref 8.6–10.3)
CHLORIDE SERPL-SCNC: 106 MMOL/L (ref 98–107)
CO2 SERPL-SCNC: 26 MMOL/L (ref 21–32)
CREAT SERPL-MCNC: 0.73 MG/DL (ref 0.5–1.05)
EGFRCR SERPLBLD CKD-EPI 2021: >90 ML/MIN/1.73M*2
ERYTHROCYTE [DISTWIDTH] IN BLOOD BY AUTOMATED COUNT: 12.3 % (ref 11.5–14.5)
GLUCOSE SERPL-MCNC: 111 MG/DL (ref 74–99)
HCT VFR BLD AUTO: 31.2 % (ref 36–46)
HGB BLD-MCNC: 9.8 G/DL (ref 12–16)
MCH RBC QN AUTO: 28.2 PG (ref 26–34)
MCHC RBC AUTO-ENTMCNC: 31.4 G/DL (ref 32–36)
MCV RBC AUTO: 90 FL (ref 80–100)
NRBC BLD-RTO: 0 /100 WBCS (ref 0–0)
PLATELET # BLD AUTO: 293 X10*3/UL (ref 150–450)
POTASSIUM SERPL-SCNC: 3.7 MMOL/L (ref 3.5–5.3)
RBC # BLD AUTO: 3.48 X10*6/UL (ref 4–5.2)
SODIUM SERPL-SCNC: 138 MMOL/L (ref 136–145)
WBC # BLD AUTO: 9 X10*3/UL (ref 4.4–11.3)

## 2024-11-19 PROCEDURE — 2500000004 HC RX 250 GENERAL PHARMACY W/ HCPCS (ALT 636 FOR OP/ED)

## 2024-11-19 PROCEDURE — 97165 OT EVAL LOW COMPLEX 30 MIN: CPT | Mod: GO

## 2024-11-19 PROCEDURE — 97110 THERAPEUTIC EXERCISES: CPT | Mod: GP,CQ

## 2024-11-19 PROCEDURE — 97530 THERAPEUTIC ACTIVITIES: CPT | Mod: GP,CQ

## 2024-11-19 PROCEDURE — 2500000001 HC RX 250 WO HCPCS SELF ADMINISTERED DRUGS (ALT 637 FOR MEDICARE OP)

## 2024-11-19 PROCEDURE — 85027 COMPLETE CBC AUTOMATED: CPT

## 2024-11-19 PROCEDURE — 80048 BASIC METABOLIC PNL TOTAL CA: CPT

## 2024-11-19 PROCEDURE — 97116 GAIT TRAINING THERAPY: CPT | Mod: GP,CQ

## 2024-11-19 PROCEDURE — 36415 COLL VENOUS BLD VENIPUNCTURE: CPT

## 2024-11-19 PROCEDURE — 97535 SELF CARE MNGMENT TRAINING: CPT | Mod: GO

## 2024-11-19 PROCEDURE — 2500000002 HC RX 250 W HCPCS SELF ADMINISTERED DRUGS (ALT 637 FOR MEDICARE OP, ALT 636 FOR OP/ED)

## 2024-11-19 PROCEDURE — 9420000001 HC RT PATIENT EDUCATION 5 MIN

## 2024-11-19 ASSESSMENT — PAIN - FUNCTIONAL ASSESSMENT
PAIN_FUNCTIONAL_ASSESSMENT: 0-10

## 2024-11-19 ASSESSMENT — COGNITIVE AND FUNCTIONAL STATUS - GENERAL
DRESSING REGULAR LOWER BODY CLOTHING: A LITTLE
PERSONAL GROOMING: A LITTLE
HELP NEEDED FOR BATHING: A LITTLE
CLIMB 3 TO 5 STEPS WITH RAILING: A LITTLE
MOVING FROM LYING ON BACK TO SITTING ON SIDE OF FLAT BED WITH BEDRAILS: A LITTLE
DRESSING REGULAR UPPER BODY CLOTHING: A LITTLE
PERSONAL GROOMING: A LITTLE
TURNING FROM BACK TO SIDE WHILE IN FLAT BAD: A LITTLE
MOVING TO AND FROM BED TO CHAIR: A LITTLE
WALKING IN HOSPITAL ROOM: A LITTLE
HELP NEEDED FOR BATHING: A LITTLE
WALKING IN HOSPITAL ROOM: A LITTLE
HELP NEEDED FOR BATHING: A LITTLE
EATING MEALS: A LITTLE
MOVING TO AND FROM BED TO CHAIR: A LITTLE
DRESSING REGULAR UPPER BODY CLOTHING: A LITTLE
WALKING IN HOSPITAL ROOM: A LITTLE
MOBILITY SCORE: 18
STANDING UP FROM CHAIR USING ARMS: A LITTLE
TURNING FROM BACK TO SIDE WHILE IN FLAT BAD: A LITTLE
TURNING FROM BACK TO SIDE WHILE IN FLAT BAD: A LITTLE
MOBILITY SCORE: 18
TURNING FROM BACK TO SIDE WHILE IN FLAT BAD: A LITTLE
TURNING FROM BACK TO SIDE WHILE IN FLAT BAD: A LITTLE
TOILETING: A LITTLE
WALKING IN HOSPITAL ROOM: A LITTLE
STANDING UP FROM CHAIR USING ARMS: A LITTLE
DAILY ACTIVITIY SCORE: 18
DRESSING REGULAR UPPER BODY CLOTHING: A LITTLE
MOVING FROM LYING ON BACK TO SITTING ON SIDE OF FLAT BED WITH BEDRAILS: A LITTLE
MOBILITY SCORE: 18
TOILETING: A LITTLE
CLIMB 3 TO 5 STEPS WITH RAILING: A LITTLE
DRESSING REGULAR LOWER BODY CLOTHING: A LITTLE
MOVING TO AND FROM BED TO CHAIR: A LITTLE
STANDING UP FROM CHAIR USING ARMS: A LITTLE
MOVING TO AND FROM BED TO CHAIR: A LITTLE
DRESSING REGULAR UPPER BODY CLOTHING: A LITTLE
MOVING FROM LYING ON BACK TO SITTING ON SIDE OF FLAT BED WITH BEDRAILS: A LITTLE
HELP NEEDED FOR BATHING: A LITTLE
DAILY ACTIVITIY SCORE: 18
WALKING IN HOSPITAL ROOM: A LITTLE
EATING MEALS: A LITTLE
MOBILITY SCORE: 18
CLIMB 3 TO 5 STEPS WITH RAILING: A LITTLE
HELP NEEDED FOR BATHING: A LITTLE
CLIMB 3 TO 5 STEPS WITH RAILING: A LITTLE
STANDING UP FROM CHAIR USING ARMS: A LITTLE
DAILY ACTIVITIY SCORE: 18
CLIMB 3 TO 5 STEPS WITH RAILING: A LITTLE
DRESSING REGULAR LOWER BODY CLOTHING: A LITTLE
WALKING IN HOSPITAL ROOM: A LITTLE
CLIMB 3 TO 5 STEPS WITH RAILING: A LITTLE
MOVING FROM LYING ON BACK TO SITTING ON SIDE OF FLAT BED WITH BEDRAILS: A LITTLE
DAILY ACTIVITIY SCORE: 18
PERSONAL GROOMING: A LITTLE
TOILETING: A LITTLE
EATING MEALS: A LITTLE
DAILY ACTIVITIY SCORE: 23
TOILETING: A LITTLE
MOBILITY SCORE: 18
DRESSING REGULAR LOWER BODY CLOTHING: A LITTLE
TURNING FROM BACK TO SIDE WHILE IN FLAT BAD: A LITTLE
MOVING TO AND FROM BED TO CHAIR: A LITTLE
MOBILITY SCORE: 18
MOVING FROM LYING ON BACK TO SITTING ON SIDE OF FLAT BED WITH BEDRAILS: A LITTLE
EATING MEALS: A LITTLE
STANDING UP FROM CHAIR USING ARMS: A LITTLE
STANDING UP FROM CHAIR USING ARMS: A LITTLE
MOVING FROM LYING ON BACK TO SITTING ON SIDE OF FLAT BED WITH BEDRAILS: A LITTLE
MOVING TO AND FROM BED TO CHAIR: A LITTLE
PERSONAL GROOMING: A LITTLE

## 2024-11-19 ASSESSMENT — PAIN SCALES - GENERAL
PAINLEVEL_OUTOF10: 8
PAINLEVEL_OUTOF10: 7
PAINLEVEL_OUTOF10: 7
PAINLEVEL_OUTOF10: 8
PAINLEVEL_OUTOF10: 7
PAINLEVEL_OUTOF10: 10 - WORST POSSIBLE PAIN
PAINLEVEL_OUTOF10: 7
PAINLEVEL_OUTOF10: 3
PAINLEVEL_OUTOF10: 3

## 2024-11-19 ASSESSMENT — PAIN DESCRIPTION - LOCATION
LOCATION: HIP
LOCATION: HIP

## 2024-11-19 ASSESSMENT — ACTIVITIES OF DAILY LIVING (ADL)
ADL_ASSISTANCE: INDEPENDENT
HOME_MANAGEMENT_TIME_ENTRY: 11

## 2024-11-19 ASSESSMENT — PAIN DESCRIPTION - ORIENTATION
ORIENTATION: RIGHT
ORIENTATION: RIGHT

## 2024-11-19 NOTE — NURSING NOTE
Met with Patient at bedside- Patient is s/p Right Posterior Hip Replacement with Dr. Rios. Discussion with patient included education on the following topics: TJR Education: Wound Care (Bandage Care & Removal, Personal Hygiene & Infection Prevention), Post-Op Activity (Home PT Regimen, Movement Precautions, Assistive Equipment & 1-2hr Movement), Post-Op Precautions (Falls, Blood Clots & Constipation), Cold-Therapy (Ice vs. Cold Therapy Machines) , Methods for Symptom Management (Pain, Nausea, Swelling & Constipation), Importance of Post-op Prescriptions, How to Obtain Medication Refills, When to Resume Driving & Who to Call, Use of VR1hart & Staff Contact Information, When to call 9-1-1, and When to call the Surgeon's Office. Patient attended joint class prior to surgery. Patient is able to verbalize understanding of class content/discussion. Contact information was provided to patient for support and assistance during the post-operative period. She was given My Medication Education tool as a reference for her discharge medications.

## 2024-11-19 NOTE — PROGRESS NOTES
Occupational Therapy    Evaluation/Treatment    Patient Name: Fauzia Patel  MRN: [id number]  Department: [place] [id number]  Room: [address] [place] [address]  Today's Date: 11/19/24  Time Calculation  Start Time: 1135  Stop Time: 1204  Time Calculation (min): 29 min       Assessment:  OT Assessment: 30 yo presenting POD#1 for R MESSI; demo's ability to manage distal LB ADLs with adherence to MESSI precautions mod I with hip kit; no additional acute OT needs, recommend shower chair and raised toilet seat  as needed for safety-- hip kit delivered during session  Prognosis: Excellent  Barriers to Discharge: None  Evaluation/Treatment Tolerance: Patient tolerated treatment well  Medical Staff Made Aware: Yes  End of Session Communication: Bedside nurse  End of Session Patient Position: Up in chair, Alarm on  Prognosis: Excellent  Barriers to Discharge: None  Evaluation/Treatment Tolerance: Patient tolerated treatment well  Medical Staff Made Aware: Yes  Plan:  Treatment Interventions: ADL retraining, Functional transfer training  OT Frequency: OT eval only  OT Discharge Recommendations: No further acute OT, No OT needed after discharge  Equipment Recommended upon Discharge:  (hip kit, shower chair, raised toilet)  OT - OK to Discharge: Yes  Treatment Interventions: ADL retraining, Functional transfer training    Subjective   Current Problem:  1. S/P total right hip arthroplasty  Referral to Home Health    Walker rolling      2. Primary osteoarthritis of right hip  XR pelvis 1-2 views    XR pelvis 1-2 views      3. Post-traumatic osteoarthritis of right hip  docusate sodium (Colace) 100 mg capsule    Referral to Physical Therapy    oxyCODONE (Roxicodone) 5 mg immediate release tablet    traMADol (Ultram) 50 mg tablet    sennosides (Senokot) 8.6 mg tablet    acetaminophen (Tylenol) 500 mg tablet    aspirin 81 mg chewable tablet    cefadroxil (Duricef) 500 mg capsule    pantoprazole (ProtoNix) 40 mg EC tablet    ondansetron (Zofran) 4 mg tablet         General:   OT Received On: 11/19/24  General  Reason for Referral: Pt is a 30 yo female POD #1 s/p R MESSI (posterior)  Referred By: Dr. Bay Rios  Past Medical History Relevant to Rehab:   Past Medical History:   Diagnosis Date    Anxiety     Depression     PTSD (post-traumatic stress disorder)      Family/Caregiver Present: Yes  Caregiver Feedback: friend present and receptive  Prior to Session Communication: Bedside nurse  Patient Position Received: Bed, 3 rail up, Alarm on  General Comment: Pt cleared for PT evaluation by primary RN. Pt pleasant and agreeable to PT evaluation. Time between subjective and objective portion of evaluation due to need for Xray of R hip prior to mobility. Cleared by RN/ortho team prior to mobility  Precautions:  LE Weight Bearing Status: Weight Bearing as Tolerated (R)  Medical Precautions: Fall precautions  Post-Surgical Precautions: Right hip precautions (posterior)    Vital Sign (Past 2hrs)                 Pain:  Pain Assessment  Pain Assessment: 0-10  0-10 (Numeric) Pain Score: 8  Pain Type: Acute pain, Surgical pain  Pain Location: Hip  Pain Orientation: Right  Pain Interventions: Repositioned (educated on mobility techniques to reduce pain)    Objective   Cognition:  Overall Cognitive Status: Within Functional Limits  Orientation Level: Oriented X4  Attention: Within Functional Limits  Memory: Within Funtional Limits  Problem Solving: Within Functional Limits  Safety/Judgement: Within Functional Limits  Insight: Within function limits  Impulsive: Within functional limits           Home Living:  Type of Home: House  Lives With:  (sister)  Home Adaptive Equipment: None (Reports ex-boyfriend lost provided FWW and hip kit -- pt vended this equipment today as self pay through [employer])  Home Layout: One level (1 step up to bathroom in home (able to go to separate bathroom if needed))  Home Access: Level entry  Bathroom Shower/Tub: Tub/shower unit, Walk-in shower  Bathroom  Toilet: Standard  Bathroom Equipment: None, Hand-held shower hose  Prior Function:  Level of [place]: Independent with ADLs and functional transfers, Independent with homemaking with ambulation  ADL Assistance: Independent  Homemaking Assistance: Independent  Ambulatory Assistance: Independent (no device)  Vocational:  ()  Prior Function Comments: had some inc difficulty with LB dressing though was able to manage without equipment  IADL History:  Homemaking Responsibilities: Yes  ADL:  UE Dressing Assistance: Independent  LE Dressing Assistance: Modified independent (Device) (hip kit AE)  LE Dressing Deficit: Don/doff R sock, Don/doff L sock, Thread LLE into underwear, Thread RLE into underwear, Thread LLE into pants, Thread RLE into pants, Verbal cueing, Supervision/safety, Increased time to complete (educated on sock management and how to use reach/dressing stick and sock aide; threading technique reviewed (pt LB clothes not yet here as pt mother bringing to hospital))  Functional Assistance: Stand by  ADL Comments: verbal review of DME-- recommended pt obtain raised toilet seat; also recommend shower chair as needed though pt able to static  shower with use of handheld hose and supervision from family    Activity Tolerance:  Endurance: Endurance does not limit participation in activity  Functional Standing Tolerance:     Bed Mobility/Transfers: Bed Mobility  Bed Mobility: Yes  Bed Mobility 1  Bed Mobility 1: Supine to sitting  Level of Assistance 1: Modified independent, Directs care (Comment)  Bed Mobility Comments 1: verbal cues for bridging technique and for maintaining MESSI precautions during trasfers from sup to EOB, good return demo    Transfers  Transfer: Yes  Transfer 1  Transfer From 1: Stand to  Transfer to 1: Sit  Technique 1: Sit to stand, Stand to sit  Transfer Device 1: Walker  Transfer Level of Assistance 1: Close supervision  Trials/Comments 1: min cues for hand placement, good  eccentric lowering control                        Functional Mobility:  Functional Mobility  Functional Mobility Performed: Yes  Functional Mobility 1  Surface 1: Level tile  Device 1: Rolling walker  Assistance 1: Distant supervision  Comments 1: pt ambulatory in room with ww and at bedside with distant sup, maintained precautions, no overt balance loss  Sitting Balance:  Static Sitting Balance  Static Sitting-Level of Assistance: Independent  Dynamic Sitting Balance  Dynamic Sitting-Level of Assistance: Independent  Standing Balance:  Static Standing Balance  Static Standing-Level of Assistance: Modified Independent  Dynamic Standing Balance  Dynamic Standing-Level of Assistance: Distant supervision    Strength:  Strength Comments: grossly WFLs except for surgical LE    Perception:  Inattention/Neglect: Cues to attend right visual field  Coordination:  Movements are Fluid and Coordinated: Yes   Hand Function:  Hand Function  Gross Grasp: Functional  Coordination: Functional  Extremities: RUE   RUE : Within Functional Limits, LUE   LUE: Within Functional Limits, RLE   RLE : Exceptions to WFL, and LLE   LLE : Within Functional Limits    Outcome Measures: [facility] Daily Activity  Putting on and taking off regular lower body clothing: None  Bathing (including washing, rinsing, drying): A little  Putting on and taking off regular upper body clothing: None  Toileting, which includes using toilet, bedpan or urinal: None  Taking care of personal grooming such as brushing teeth: None  Eating Meals: None  Daily Activity - Total Score: 23      Education Documentation  Handouts, taught by Nelson Hernandez OT at 11/19/2024  2:45 PM.  Learner: Patient  Readiness: Acceptance  Method: Explanation  Response: Verbalizes Understanding    Body Mechanics, taught by Nelson Hernandez OT at 11/19/2024  2:45 PM.  Learner: Patient  Readiness: Acceptance  Method: Explanation  Response: Verbalizes Understanding    Precautions, taught by Nelson  JESSICA Hernandez, OT at 11/19/2024  2:45 PM.  Learner: Patient  Readiness: Acceptance  Method: Explanation  Response: Verbalizes Understanding    ADL Training, taught by Nelson Hernandez, OT at 11/19/2024  2:45 PM.  Learner: Patient  Readiness: Acceptance  Method: Explanation  Response: Verbalizes Understanding    Education Comments  No comments found.

## 2024-11-19 NOTE — PROGRESS NOTES
"Orthopaedic Surgery Progress Note    Subjective:  Procedure yesterday without complications. Resting comfortably in bed this AM. NAEO. AFVSS. Denies calf pain, N/T, CP, SOB, fever or chills. Patient still amenable to plan to discharge after PT/OT today.    Objective:  /71 (Patient Position: Lying)   Pulse 81   Temp 36.8 °C (98.3 °F) (Temporal)   Resp 16   Ht 1.727 m (5' 8\")   Wt 107 kg (235 lb 10.8 oz)   SpO2 96%   BMI 35.83 kg/m²     Gen: arousable, NAD, appropriately conversational  Cardiac: RRR to peripheral palpation  Resp: nonlabored on RA  GI: soft, non-distended  MSK:  RLE:   - postoperative mepilex in place without strikethrough  - appropriately warm, tender postoperative extremity   - Motor intact in DF/PF/EHL/FHL  - SILT in saph/sural/SPN/DPN distributions  - Foot wwp, 2+ DP/PT pulse, brisk cap refill  - Compartments soft and compressible, no pain with passive dorsiflexion      Results for orders placed or performed during the hospital encounter of 11/18/24 (from the past 24 hours)   POCT pregnancy, urine   Result Value Ref Range    Preg Test, Ur Negative Negative       XR pelvis 1-2 views   Final Result   Immediately status post  right hip arthroplasty. No acute fracture or   dislocation.        MACRO:   None        Signed by: Alex Thompson 11/18/2024 2:34 PM   Dictation workstation:   [id number]      XR femur right 1 view   Final Result   Immediately status post  right hip arthroplasty. No acute fracture or   dislocation.        MACRO:   None        Signed by: Alex Thompson 11/18/2024 2:34 PM   Dictation workstation:   [id number]      XR pelvis 1-2 views   Final Result   Ongoing right hip arthroplasty.        MACRO:   None        Signed by: Alex Thompson 11/18/2024 11:04 AM   Dictation workstation:   [id number]          Assessment/Plan: 29 y.o. female s/p R hip CLAUDE, conversion to R MESSI on 11/18 with Dr. Shahbaz Schneider.      Plan:  - Weight bearing: WBAT w/ posterior hip precautions  - DVT " ppx: SCDs, ASA 81 mg BID to start POD1  - Diet: Regular as tolerated   - Protonix 40 mg daily  - Pain: Tylenol, oxycodone 5/10, dilaudid breakthrough  - Antibiotics: perioperative ancef 2g q8hr x3 doses  - FEN: mIVF LR @ 100cc/hr; HLIV with good PO intake; monitor BMP  - Lines: maintain PIVx2 while inpatient  - Bowel Regimen: miralax, germaine-colace  - PT/OT: consulted  - Pulm: Encourage IS, maintain O2 sat >92%  - Cardiac: no issues  - Glycemic: no issues  - Continue home medications   - Buspar 10 mg daily/PRN  - Gifford: none   - Drains: none     Dispo: Pending PT/OT, pain control, anticipate home w/ [facility] 11/19 vs 11/20    Josesito Mckeon MD, MD  Orthopedic Surgery PGY-2  [facility]  Available by Epic Chat    While inpatient, this patient will be followed by the Orthopaedic Trauma team. Please see contact information below:    [facility]  Alex Chery MD PGY1  Josesito Mckeon MD PGY2  Ildefonso Garrett MD PGY4    Please page [phone] (ortho on-call) after 6pm and on weekends.

## 2024-11-19 NOTE — PROGRESS NOTES
Physical Therapy    Physical Therapy Treatment    Patient Name: Fauzia Patel  MRN: [id number]  Department: [place] [address]  Room: [address] [place] [address]  Today's Date: 11/19/2024  Time Calculation  Start Time: 0910  Stop Time: 0954  Time Calculation (min): 44 min         Assessment/Plan   PT Assessment  End of Session Communication: Bedside nurse  Assessment Comment: pt able to walk and complete curb step  End of Session Patient Position: Alarm on, Bed, 3 rail up  PT Plan  Inpatient/Swing Bed or Outpatient: Inpatient  PT Plan  Treatment/Interventions: Bed mobility, Transfer training, Gait training, Stair training  PT Plan: Ongoing PT  PT Frequency: BID  PT Discharge Recommendations: Low intensity level of continued care  Equipment Recommended upon Discharge: Wheeled walker (needs vended prior to DC)  PT Recommended Transfer Status: Assist x1  PT - OK to Discharge: Yes (per POC)      General Visit Information:   PT  Visit  PT Received On: 11/19/24  General  Reason for Referral: R MESSI  Referred By: Dr. Bay Rios  Family/Caregiver Present: Yes  Prior to Session Communication: Bedside nurse  Patient Position Received: Bed, 3 rail up, Alarm on  Preferred Learning Style: auditory  General Comment: pt agreeable to tx, increased time req 2/2 pain    Subjective   Precautions:  Precautions  LE Weight Bearing Status: Weight Bearing as Tolerated  Medical Precautions: Fall precautions  Post-Surgical Precautions: Right hip precautions    Vital Signs (Past 2hrs)                 Objective   Pain:  Pain Assessment  0-10 (Numeric) Pain Score: 7  Pain Type: Surgical pain  Pain Location: Hip  Pain Orientation: Right  Pain Interventions: Medication (See MAR)  Cognition:  Cognition  Overall Cognitive Status: Within Functional Limits  Coordination:     Postural Control:     Extremity/Trunk Assessments:    Activity Tolerance:     Treatments:  Therapeutic Exercise  Therapeutic Exercise Performed: Yes  Therapeutic Exercise Activity 1: pt performed  supine ther ex x15 : AP, QS, GS, SAQ AA, Hip flexion AA, and Hip ABD AA. vc/tc req increased time req         Bed Mobility  Bed Mobility: Yes  Bed Mobility 1  Bed Mobility 1: Supine to sitting, Sitting to supine  Level of Assistance 1: Contact guard  Bed Mobility Comments 1: CGA to bring RLE over EOB. pt completed slowly    Ambulation/Gait Training  Ambulation/Gait Training Performed: Yes  Ambulation/Gait Training 1  Surface 1: Level tile  Device 1: Rolling walker  Gait Support Devices: Gait belt  Assistance 1: Close supervision, Contact guard  Quality of Gait 1: Antalgic  Comments/Distance (ft) 1: 40x2, 5x1 (step to gait pattern. VC for forward gaze.  pt performed with decreased step length.  x2 standing rest breaks.  pt reported feeling dizzy, req seated rest break and brought back to room on rolling chair.  RN notified and aware)  Transfer 1  Technique 1: Sit to stand, Stand to sit  Transfer Device 1: Walker  Transfer Level of Assistance 1: Close supervision, Contact guard  Trials/Comments 1: vc/tc for hand placment on solid sitting surface and not RW as well as RLE placement.  pt performed x3    Stairs  Stairs: Yes  Stairs  Comment/Number of Steps 1: pt performed a curb step with use of RW, VC for AD placement and BLE sequence.  CGA.  no overt LOB    Outcome Measures:  [facility] Basic Mobility  Turning from your back to your side while in a flat bed without using bedrails: A little  Moving from lying on your back to sitting on the side of a flat bed without using bedrails: A little  Moving to and from bed to chair (including a wheelchair): A little  Standing up from a chair using your arms (e.g. wheelchair or bedside chair): A little  To walk in hospital room: A little  Climbing 3-5 steps with railing: A little  Basic Mobility - Total Score: 18    Education Documentation  Home Exercise Program, taught by Mariano Bill PTA at 11/19/2024  2:48 PM.  Learner: Patient  Readiness: Acceptance  Method:  Explanation  Response: Verbalizes Understanding    Body Mechanics, taught by Mariano Bill PTA at 11/19/2024  2:48 PM.  Learner: Patient  Readiness: Acceptance  Method: Explanation  Response: Verbalizes Understanding    Mobility Training, taught by Mariano Bill PTA at 11/19/2024  2:48 PM.  Learner: Patient  Readiness: Acceptance  Method: Explanation  Response: Verbalizes Understanding    Education Comments  No comments found.        OP EDUCATION:  Outpatient Education  Education Comment: Educated pt on surgical precautions.  pt stated understanding    Encounter Problems       Encounter Problems (Active)       Mobility       STG - Patient will ambulate (Progressing)       Start:  11/18/24    Expected End:  12/02/24       >150ft using FWW Mod Ind         STG - Patient will ambulate up and down a curb/step (Progressing)       Start:  11/18/24    Expected End:  12/02/24       Using FWW SUP on step curb            PT Transfers       STG - Patient will perform bed mobility (Progressing)       Start:  11/18/24    Expected End:  12/02/24       Mod Ind         STG - Patient will transfer sit to and from stand (Progressing)       Start:  11/18/24    Expected End:  12/02/24       Mod Ind         HEP       Start:  11/18/24    Expected End:  12/02/24       Pt will complete R posterior MESSI HEP with SUP            Pain - Adult

## 2024-11-19 NOTE — HH CARE COORDINATION
Home Care received a Referral for Physical Therapy and Occupational Therapy. We have processed the referral for a Start of Care on 24-48 HRS.     If you have any questions or concerns, please feel free to contact us at [phone]. Follow the prompts, enter your five digit zip code, and you will be directed to your care team on EAST 1.

## 2024-11-19 NOTE — PROGRESS NOTES
Physical Therapy    Physical Therapy Treatment    Patient Name: Fauzia Patel  MRN: [id number]  Department: [place] [address]  Room: [address] [place] [address]  Today's Date: 11/19/2024  Time Calculation  Start Time: 1318  Stop Time: 1343  Time Calculation (min): 25 min         Assessment/Plan   PT Assessment  End of Session Communication: Bedside nurse  Assessment Comment: pt able to walk further this date  End of Session Patient Position: Alarm on, Bed, 3 rail up  PT Plan  Inpatient/Swing Bed or Outpatient: Inpatient  PT Plan  Treatment/Interventions: Bed mobility, Transfer training, Gait training  PT Plan: Ongoing PT  PT Frequency: BID  PT Discharge Recommendations: Low intensity level of continued care  Equipment Recommended upon Discharge: Wheeled walker (needs vended prior to DC)  PT Recommended Transfer Status: Assist x1  PT - OK to Discharge: Yes (per POC)      General Visit Information:   PT  Visit  PT Received On: 11/19/24  General  Reason for Referral: Pt is a 28 yo female POD #1 s/p R MESSI (posterior)  Referred By: Dr. Bay Rios  Family/Caregiver Present: Yes  Prior to Session Communication: Bedside nurse  Patient Position Received: Bed, 3 rail up, Alarm on  Preferred Learning Style: auditory  General Comment: agreeable to tx    Subjective   Precautions:  Precautions  LE Weight Bearing Status: Weight Bearing as Tolerated  Medical Precautions: Fall precautions  Post-Surgical Precautions: Right hip precautions    Vital Signs (Past 2hrs)                 Objective   Pain:  Pain Assessment  0-10 (Numeric) Pain Score: 7  Pain Type: Surgical pain  Pain Location: Hip  Pain Orientation: Right  Pain Interventions: Medication (See MAR)  Cognition:  Cognition  Overall Cognitive Status: Within Functional Limits  Coordination:     Postural Control:  Static Sitting Balance  Static Sitting-Comment/Number of Minutes: pt performed at EOB with S, pt performed x4 min  Static Standing Balance  Static Standing-Comment/Number of Minutes: pt  performed static standing balance with UE support at RW and  CGA/SBA,  x3 min  Extremity/Trunk Assessments:    Activity Tolerance:     Treatments:  Therapeutic Exercise  Therapeutic Exercise Performed: Yes  Therapeutic Exercise Activity 1: pt performed supine ther ex x15 : AP, QS, GS, SAQ AA, Hip flexion AA, and Hip ABD AA. vc/tc req increased time req         Bed Mobility  Bed Mobility: Yes  Bed Mobility 1  Bed Mobility 1: Supine to sitting, Sitting to supine  Level of Assistance 1: Contact guard  Bed Mobility Comments 1: assist to bring RLE over EOB, increased time req    Ambulation/Gait Training  Ambulation/Gait Training Performed: Yes  Ambulation/Gait Training 1  Surface 1: Level tile  Device 1: Rolling walker  Gait Support Devices: Gait belt  Assistance 1: Close supervision  Quality of Gait 1: Antalgic  Comments/Distance (ft) 1: 60x2 (cues for forward gaze/up right posture. VC to stay within RW. slow and steady, no overt LOB noted.  x2 standing rest breaks)  Transfer 1  Technique 1: Sit to stand, Stand to sit  Transfer Device 1: Walker  Transfer Level of Assistance 1: Close supervision  Trials/Comments 1: vc/tc for hand placment on solid sitting surface and not RW as well as RLE placement.    Outcome Measures:  [facility] Basic Mobility  Turning from your back to your side while in a flat bed without using bedrails: A little  Moving from lying on your back to sitting on the side of a flat bed without using bedrails: A little  Moving to and from bed to chair (including a wheelchair): A little  Standing up from a chair using your arms (e.g. wheelchair or bedside chair): A little  To walk in hospital room: A little  Climbing 3-5 steps with railing: A little  Basic Mobility - Total Score: 18    Education Documentation  Home Exercise Program, taught by Mariano Bill PTA at 11/19/2024  2:48 PM.  Learner: Patient  Readiness: Acceptance  Method: Explanation  Response: Verbalizes Understanding    Body Mechanics, taught  by Mariano Bill PTA at 11/19/2024  2:48 PM.  Learner: Patient  Readiness: Acceptance  Method: Explanation  Response: Verbalizes Understanding    Mobility Training, taught by Mariano Bill PTA at 11/19/2024  2:48 PM.  Learner: Patient  Readiness: Acceptance  Method: Explanation  Response: Verbalizes Understanding    Education Comments  No comments found.        OP EDUCATION:  Outpatient Education  Education Comment: Educated pt on surgical precautions as well as car transfer technique, stating understanding.  pt stated understanding    Encounter Problems       Encounter Problems (Active)       Mobility       STG - Patient will ambulate (Progressing)       Start:  11/18/24    Expected End:  12/02/24       >150ft using FWW Mod Ind         STG - Patient will ambulate up and down a curb/step (Progressing)       Start:  11/18/24    Expected End:  12/02/24       Using FWW SUP on step curb            PT Transfers       STG - Patient will perform bed mobility (Progressing)       Start:  11/18/24    Expected End:  12/02/24       Mod Ind         STG - Patient will transfer sit to and from stand (Progressing)       Start:  11/18/24    Expected End:  12/02/24       Mod Ind         HEP       Start:  11/18/24    Expected End:  12/02/24       Pt will complete R posterior MESSI HEP with SUP            Pain - Adult

## 2024-11-19 NOTE — CARE PLAN
The patient's goals for the shift include      The clinical goals for the shift include Patient will have adequate pain control throught the shift    Over the shift, the patient did make progress toward the following goals.

## 2024-11-19 NOTE — NURSING NOTE
Interdisciplinary team present: NP, PT, NM, CC, SW, Orthopedic Coordinator, and bedside RN.  Pain - controlled  Nausea - none  Discharge barrier - OT eval, more PT  Discharge plan - home with [facility]  Discharge date/time -  later today

## 2024-11-20 NOTE — DISCHARGE SUMMARY
Discharge Diagnosis  Primary osteoarthritis of right hip    Issues Requiring Follow-Up  Right hip removal of hardware, conversion to Right total hip arthroplasty    Test Results Pending At Discharge  Pending Labs       No current pending labs.            Hospital Course  29 year-old female who presented with right hip post-traumatic arthritis after a femoral neck fracture at age 16. Patient is now s/p R hip removal of hardware and conversion to MESSI on 11/18 by Dr. Rios. On the day of surgery, patient was identified in the pre-operative holding area and agreeable to proceed with surgery. Written consent was obtained.  Please see operative note for further details of this procedure. Patient received 24 hours of germaine-operative antibiotics. Patient recovered in the PACU before transfer to a regular nursing floor. Patient was started on oxycodone, tylenol, and dilaudid for pain control and ASA 81 mg bid for DVT prophylaxis. Physical therapy recommended continued recovery at home with continued physical therapy and wound care. On the day of discharge, patient was afebrile with stable vital signs. Patient was neurovascularly intact at time of discharge. Patient was discharged with prescription of ASA 81 mg bid for DVT prophylaxis for 4 weeks. Patient will follow-up with Dr. Rios in 2 weeks for post-operative visit.      Pertinent Physical Exam At Time of Discharge  Gen: arousable, NAD, appropriately conversational  Cardiac: RRR to peripheral palpation  Resp: nonlabored on RA  GI: soft, non-distended  MSK:  RLE:   - postoperative mepilex in place without strikethrough  - appropriately warm, tender postoperative extremity   - Motor intact in DF/PF/EHL/FHL  - SILT in saph/sural/SPN/DPN distributions  - Foot wwp, 2+ DP/PT pulse, brisk cap refill  - Compartments soft and compressible, no pain with passive dorsiflexion    Home Medications     Medication List      START taking these medications     acetaminophen 500  mg tablet; Commonly known as: Tylenol; Take 2 tablets   (1,000 mg) by mouth every 8 hours for 20 days.; Replaces: acetaminophen   650 mg ER tablet   aspirin 81 mg chewable tablet; Chew and swallow 1 tablet (81 mg) by   mouth 2 times a day.   cefadroxil 500 mg capsule; Commonly known as: Duricef; Take 1 capsule   (500 mg) by mouth 2 times a day for 5 days.   docusate sodium 100 mg capsule; Commonly known as: Colace; Take 1   capsule (100 mg) by mouth 2 times a day for 15 days.   ondansetron 4 mg tablet; Commonly known as: Zofran; Take 1 tablet (4 mg)   by mouth every 8 hours if needed for nausea or vomiting.   oxyCODONE 5 mg immediate release tablet; Commonly known as: Roxicodone;   Take 1-2 tablets (5-10 mg) by mouth every 6 hours if needed for severe   pain (7 - 10) for up to 7 days.   senna 8.6 mg tablet; Generic drug: sennosides; Take 1 tablet (8.6 mg) by   mouth once daily for 15 days.   traMADol 50 mg tablet; Commonly known as: Ultram; Take 1-2 tablets   ( mg) by mouth every 6 hours if needed for severe pain (7 - 10) for   up to 7 days.     CHANGE how you take these medications     pantoprazole 40 mg EC tablet; Commonly known as: ProtoNix; Take 1 tablet   (40 mg) by mouth once daily in the morning before meals. Do not crush,   chew, or split.; What changed: additional instructions     STOP taking these medications     acetaminophen 650 mg ER tablet; Commonly known as: Tylenol 8 HOUR;   Replaced by: acetaminophen 500 mg tablet   busPIRone 10 mg tablet; Commonly known as: Buspar   chlorhexidine 0.12 % solution; Commonly known as: Peridex       Outpatient Follow-Up  Future Appointments   Date Time Provider Department Center   11/21/2024 To Be Determined Mariano Sal PT [facility]   11/22/2024  2:00 PM Nazanin Holloway OT [facility]   12/26/2024  1:00 PM Bay Rios MD [id number] [facility]   2/11/2025  3:30 PM Lauren Mauricio PA-C [id number] [facility]       Alex Chery MD

## 2024-11-21 ENCOUNTER — HOME CARE VISIT (OUTPATIENT)
Dept: HOME HEALTH SERVICES | Facility: HOME HEALTH | Age: 29
End: 2024-11-21
Payer: MEDICAID

## 2024-11-21 ENCOUNTER — TELEPHONE (OUTPATIENT)
Dept: ORTHOPEDIC SURGERY | Facility: HOSPITAL | Age: 29
End: 2024-11-21
Payer: MEDICAID

## 2024-11-21 VITALS — HEART RATE: 92 BPM | DIASTOLIC BLOOD PRESSURE: 74 MMHG | TEMPERATURE: 98.2 F | SYSTOLIC BLOOD PRESSURE: 124 MMHG

## 2024-11-21 PROCEDURE — G0151 HHCP-SERV OF PT,EA 15 MIN: HCPCS

## 2024-11-21 SDOH — HEALTH STABILITY: PHYSICAL HEALTH: EXERCISE COMMENTS: SITTING LAQ AP  SUPINE. AP QS GS HEEL SLIDES SAQ. UNABLE TO DO HIP ABD X 10 REPS

## 2024-11-21 ASSESSMENT — ENCOUNTER SYMPTOMS
PAIN LOCATION - PAIN SEVERITY: 3/10
DEPRESSION: 1
LOSS OF SENSATION IN FEET: 0
MUSCLE WEAKNESS: 1
PAIN: 1
OCCASIONAL FEELINGS OF UNSTEADINESS: 0
HIGHEST PAIN SEVERITY IN PAST 24 HOURS: 8/10
LOWEST PAIN SEVERITY IN PAST 24 HOURS: 3/10
PAIN LOCATION: RIGHT HIP

## 2024-11-21 ASSESSMENT — ACTIVITIES OF DAILY LIVING (ADL)
OASIS_M1830: 05
CURRENT_FUNCTION: STAND BY ASSIST
AMBULATION ASSISTANCE: STAND BY ASSIST
AMBULATION_DISTANCE/DURATION_TOLERATED: 60 FEET
ENTERING_EXITING_HOME: CONTACT GUARD ASSIST

## 2024-11-22 ENCOUNTER — HOME CARE VISIT (OUTPATIENT)
Dept: HOME HEALTH SERVICES | Facility: HOME HEALTH | Age: 29
End: 2024-11-22
Payer: MEDICAID

## 2024-11-22 DIAGNOSIS — M16.51 POST-TRAUMATIC OSTEOARTHRITIS OF RIGHT HIP: ICD-10-CM

## 2024-11-22 PROCEDURE — G0152 HHCP-SERV OF OT,EA 15 MIN: HCPCS

## 2024-11-22 ASSESSMENT — ACTIVITIES OF DAILY LIVING (ADL)
TOILETING: SUPERVISION
CURRENT_FUNCTION: SUPERVISION
AMBULATION ASSISTANCE: SUPERVISION
BATHING_CURRENT_FUNCTION: SUPERVISION
DRESSING_UB_CURRENT_FUNCTION: SUPERVISION
DRESSING_LB_CURRENT_FUNCTION: MINIMUM ASSIST
PHYSICAL TRANSFERS ASSESSED: 1
TOILETING: 1
BATHING ASSESSED: 1
AMBULATION ASSISTANCE: 1

## 2024-11-22 ASSESSMENT — ENCOUNTER SYMPTOMS
PERSON REPORTING PAIN: PATIENT
PAIN LOCATION: RIGHT HIP
PAIN: 1
LOWEST PAIN SEVERITY IN PAST 24 HOURS: 7/10
HIGHEST PAIN SEVERITY IN PAST 24 HOURS: 7/10
PAIN SEVERITY GOAL: 3/10
PAIN LOCATION - PAIN SEVERITY: 7/10
SUBJECTIVE PAIN PROGRESSION: UNCHANGED

## 2024-11-23 ENCOUNTER — HOSPITAL ENCOUNTER (EMERGENCY)
Facility: HOSPITAL | Age: 29
Discharge: HOME | End: 2024-11-23
Payer: MEDICAID

## 2024-11-23 ENCOUNTER — APPOINTMENT (OUTPATIENT)
Dept: RADIOLOGY | Facility: HOSPITAL | Age: 29
End: 2024-11-23
Payer: MEDICAID

## 2024-11-23 VITALS
WEIGHT: 240 LBS | SYSTOLIC BLOOD PRESSURE: 128 MMHG | DIASTOLIC BLOOD PRESSURE: 80 MMHG | HEART RATE: 94 BPM | TEMPERATURE: 97.5 F | OXYGEN SATURATION: 98 % | RESPIRATION RATE: 17 BRPM | HEIGHT: 68 IN | BODY MASS INDEX: 36.37 KG/M2

## 2024-11-23 DIAGNOSIS — S39.012A LUMBAR STRAIN, INITIAL ENCOUNTER: Primary | ICD-10-CM

## 2024-11-23 PROCEDURE — 99283 EMERGENCY DEPT VISIT LOW MDM: CPT

## 2024-11-23 PROCEDURE — 96372 THER/PROPH/DIAG INJ SC/IM: CPT

## 2024-11-23 PROCEDURE — 72170 X-RAY EXAM OF PELVIS: CPT | Performed by: RADIOLOGY

## 2024-11-23 PROCEDURE — 2500000001 HC RX 250 WO HCPCS SELF ADMINISTERED DRUGS (ALT 637 FOR MEDICARE OP)

## 2024-11-23 PROCEDURE — 2500000004 HC RX 250 GENERAL PHARMACY W/ HCPCS (ALT 636 FOR OP/ED)

## 2024-11-23 PROCEDURE — 72170 X-RAY EXAM OF PELVIS: CPT

## 2024-11-23 RX ORDER — PREDNISONE 50 MG/1
50 TABLET ORAL DAILY
Qty: 5 TABLET | Refills: 0 | Status: SHIPPED | OUTPATIENT
Start: 2024-11-23 | End: 2024-11-28

## 2024-11-23 RX ORDER — OXYCODONE HYDROCHLORIDE 5 MG/1
5 TABLET ORAL EVERY 6 HOURS PRN
Qty: 12 TABLET | Refills: 0 | Status: SHIPPED | OUTPATIENT
Start: 2024-11-23 | End: 2024-11-26

## 2024-11-23 RX ORDER — OXYCODONE HYDROCHLORIDE 5 MG/1
10 TABLET ORAL ONCE
Status: COMPLETED | OUTPATIENT
Start: 2024-11-23 | End: 2024-11-23

## 2024-11-23 RX ORDER — IBUPROFEN 800 MG/1
800 TABLET ORAL 3 TIMES DAILY
Qty: 21 TABLET | Refills: 0 | Status: SHIPPED | OUTPATIENT
Start: 2024-11-23 | End: 2024-11-30

## 2024-11-23 RX ORDER — CYCLOBENZAPRINE HCL 10 MG
10 TABLET ORAL 2 TIMES DAILY PRN
Qty: 20 TABLET | Refills: 0 | Status: SHIPPED | OUTPATIENT
Start: 2024-11-23 | End: 2024-12-03

## 2024-11-23 RX ORDER — HYDROMORPHONE HYDROCHLORIDE 1 MG/ML
1 INJECTION, SOLUTION INTRAMUSCULAR; INTRAVENOUS; SUBCUTANEOUS ONCE
Status: COMPLETED | OUTPATIENT
Start: 2024-11-23 | End: 2024-11-23

## 2024-11-23 RX ORDER — ORPHENADRINE CITRATE 30 MG/ML
60 INJECTION INTRAMUSCULAR; INTRAVENOUS ONCE
Status: COMPLETED | OUTPATIENT
Start: 2024-11-23 | End: 2024-11-23

## 2024-11-23 RX ORDER — KETOROLAC TROMETHAMINE 30 MG/ML
15 INJECTION, SOLUTION INTRAMUSCULAR; INTRAVENOUS ONCE
Status: COMPLETED | OUTPATIENT
Start: 2024-11-23 | End: 2024-11-23

## 2024-11-23 RX ADMIN — KETOROLAC TROMETHAMINE 15 MG: 30 INJECTION, SOLUTION INTRAMUSCULAR at 20:39

## 2024-11-23 RX ADMIN — HYDROMORPHONE HYDROCHLORIDE 1 MG: 1 INJECTION, SOLUTION INTRAMUSCULAR; INTRAVENOUS; SUBCUTANEOUS at 21:32

## 2024-11-23 RX ADMIN — OXYCODONE 10 MG: 5 TABLET ORAL at 20:38

## 2024-11-23 RX ADMIN — PREDNISONE 50 MG: 20 TABLET ORAL at 21:32

## 2024-11-23 RX ADMIN — ORPHENADRINE CITRATE 60 MG: 60 INJECTION INTRAMUSCULAR; INTRAVENOUS at 21:32

## 2024-11-23 ASSESSMENT — PAIN SCALES - GENERAL
PAINLEVEL_OUTOF10: 7
PAINLEVEL_OUTOF10: 10 - WORST POSSIBLE PAIN

## 2024-11-23 ASSESSMENT — PAIN - FUNCTIONAL ASSESSMENT
PAIN_FUNCTIONAL_ASSESSMENT: 0-10
PAIN_FUNCTIONAL_ASSESSMENT: 0-10

## 2024-11-23 ASSESSMENT — PAIN DESCRIPTION - LOCATION: LOCATION: HIP

## 2024-11-23 ASSESSMENT — LIFESTYLE VARIABLES
HAVE PEOPLE ANNOYED YOU BY CRITICIZING YOUR DRINKING: NO
EVER FELT BAD OR GUILTY ABOUT YOUR DRINKING: NO
TOTAL SCORE: 0
HAVE YOU EVER FELT YOU SHOULD CUT DOWN ON YOUR DRINKING: NO
EVER HAD A DRINK FIRST THING IN THE MORNING TO STEADY YOUR NERVES TO GET RID OF A HANGOVER: NO

## 2024-11-23 ASSESSMENT — PAIN DESCRIPTION - PAIN TYPE: TYPE: ACUTE PAIN

## 2024-11-24 NOTE — ED TRIAGE NOTES
Patient had total hip replacement on Monday. Patient states at approx 7am she rolled over in bed and felt a pop in her lower back/near right hip and has severe burning pain in right hip. MSP intact, no rotation, or shortening to leg.

## 2024-11-24 NOTE — ED PROVIDER NOTES
HPI   Chief Complaint   Patient presents with    Back Pain    Hip Pain       HPI  Patient is a 29-year-old female who presents to ED for acute right-sided back pain that radiates down the right leg.  Patient states pain started this morning when she rolled over in bed and felt a pop in her lower back.  She complains of a burning type pain in the right hip.  No other acute complaints today.  Denies any back pain red flags.  Did have a recent hip surgery 5 days ago      Patient History   Past Medical History:   Diagnosis Date    Anxiety     Depression     PTSD (post-traumatic stress disorder)      Past Surgical History:   Procedure Laterality Date    CT GUIDED PERCUTANEOUS PERITONEAL OR RETROPERITONEAL FLUID COLLECTION DRAINAGE  02/13/2023    CT GUIDED PERCUTANEOUS PERITONEAL OR RETROPERITONEAL FLUID COLLECTION DRAINAGE [facility]    HYSTEROSCOPY       No family history on file.  Social History     Tobacco Use    Smoking status: Never     Passive exposure: Never    Smokeless tobacco: Never   Vaping Use    Vaping status: Every Day    Substances: Nicotine    Devices: Disposable   Substance Use Topics    Alcohol use: Not Currently    Drug use: Yes     Types: Marijuana     Comment: last used a week ago       Physical Exam   ED Triage Vitals [11/23/24 1940]   Temperature Heart Rate Respirations BP   36.4 °C (97.5 °F) 99 17 146/86      Pulse Ox Temp Source Heart Rate Source Patient Position   98 % Temporal Monitor --      BP Location FiO2 (%)     -- --       Physical Exam  Vitals reviewed.   Constitutional:       General: She is not in acute distress.     Appearance: Normal appearance. She is not ill-appearing.   HENT:      Head: Normocephalic and atraumatic.   Eyes:      Extraocular Movements: Extraocular movements intact.   Cardiovascular:      Rate and Rhythm: Normal rate and regular rhythm.      Heart sounds: Normal heart sounds.   Pulmonary:      Effort: Pulmonary effort is normal.      Breath sounds: Normal  breath sounds.   Abdominal:      General: Abdomen is flat.      Palpations: Abdomen is soft.      Tenderness: There is no abdominal tenderness.   Musculoskeletal:         General: Tenderness (Tenderness over the right hip) present.      Cervical back: Normal range of motion and neck supple.   Skin:     General: Skin is warm and dry.   Neurological:      General: No focal deficit present.      Mental Status: She is alert and oriented to person, place, and time.   Psychiatric:         Mood and Affect: Mood normal.         Behavior: Behavior normal.           ED Course & MDM   Diagnoses as of 11/24/24 0014   Lumbar strain, initial encounter                 No data recorded     Jj Coma Scale Score: 15 (11/23/24 2033 : Mo Serrato RN)                           Medical Decision Making  Parts of this chart have been completed using voice recognition software. Please excuse any errors of transcription.  My thought process and reason for plan has been formulated from the time that I saw the patient until the time of disposition and is not specific to one specific moment during their visit and furthermore my MDM encompasses this entire chart and not only this text box.    HPI:   A medically appropriate HPI was obtained, outlined above.    Fauzia Patel is a  29 y.o. female    Chief Complaint   Patient presents with    Back Pain    Hip Pain       Past Medical History:   Diagnosis Date    Anxiety     Depression     PTSD (post-traumatic stress disorder)        Past Surgical History:   Procedure Laterality Date    CT GUIDED PERCUTANEOUS PERITONEAL OR RETROPERITONEAL FLUID COLLECTION DRAINAGE  02/13/2023    CT GUIDED PERCUTANEOUS PERITONEAL OR RETROPERITONEAL FLUID COLLECTION DRAINAGE [facility]    HYSTEROSCOPY         Social History     Tobacco Use    Smoking status: Never     Passive exposure: Never    Smokeless tobacco: Never   Vaping Use    Vaping status: Every Day    Substances: Nicotine    Devices:  "Disposable   Substance Use Topics    Alcohol use: Not Currently    Drug use: Yes     Types: Marijuana     Comment: last used a week ago       No family history on file.    No Known Allergies    Current Outpatient Medications   Medication Instructions    acetaminophen (TYLENOL) 1,000 mg, oral, Every 8 hours    aspirin 81 mg chewable tablet Chew and swallow 1 tablet (81 mg) by mouth 2 times a day.    cefadroxil (DURICEF) 500 mg, oral, 2 times daily    cyclobenzaprine (FLEXERIL) 10 mg, oral, 2 times daily PRN    docusate sodium (COLACE) 100 mg, oral, 2 times daily    ibuprofen 800 mg, oral, 3 times daily    ondansetron (ZOFRAN) 4 mg, oral, Every 8 hours PRN    oxyCODONE (ROXICODONE) 5-10 mg, oral, Every 6 hours PRN    oxyCODONE (ROXICODONE) 5 mg, oral, Every 6 hours PRN    pantoprazole (ProtoNix) 40 mg EC tablet Take 1 tablet (40 mg) by mouth once daily in the morning before meals. Do not crush, chew, or split.    predniSONE (DELTASONE) 50 mg, oral, Daily    senna 8.6 mg, oral, Daily    traMADol (ULTRAM)  mg, oral, Every 6 hours PRN       History obtained from:   Patient    Exam:   Patient Vitals for the past 24 hrs:   BP Temp Temp src Pulse Resp SpO2 Height Weight   11/23/24 2100 128/80 -- -- 94 17 98 % -- --   11/23/24 1940 146/86 36.4 °C (97.5 °F) Temporal 99 17 98 % 1.727 m (5' 8\") 109 kg (240 lb)       A medically appropriate exam performed, outlined above, given the known history and presentation.    EKG/Cardiac monitor:     Social Determinants of Health considered during this visit:     Medications given during visit:  Medications   ketorolac (Toradol) injection 15 mg (15 mg intramuscular Given 11/23/24 2039)   oxyCODONE (Roxicodone) immediate release tablet 10 mg (10 mg oral Given 11/23/24 2038)   HYDROmorphone (Dilaudid) injection 1 mg (1 mg intramuscular Given 11/23/24 2132)   orphenadrine (Norflex) injection 60 mg (60 mg intramuscular Given 11/23/24 2132)   predniSONE (Deltasone) tablet 50 mg (50 mg " oral Given 11/23/24 2132)        Diagnostic/tests:  Labs Reviewed - No data to display     XR pelvis 1-2 views   Final Result   No fracture identified.             MACRO:   None        Signed by: Brett Worrellck 11/23/2024 9:16 PM   Dictation workstation:   [id number]           Differential:  As I have deemed necessary from their history, physical, and studies, I have considered the following diagnoses:     FEMORAL NECK FRACTURE  DISLOACTION  OSTEONECROSIS  OSTEOARTHRITIS  RADICULOPATHY      Critical Care:  Not indicated    MDM Summary:  Patient able to ambulate with a walker.  Suspect symptoms are sciatic nerve pain.  Patient medicated with oxycodone, prednisone, Toradol, Norflex and reported minimal relief.  Patient reports significant relief after Dilaudid.  She is safe for discharge at this time with prescriptions.  She will follow-up with her surgeon or return to the ED if symptoms worsen.    We have discussed the diagnosis and risks, and we agree with discharging home to follow-up with appropriate physician as directed. We also discussed returning to the Emergency Department immediately if new or worsening symptoms occur. We have discussed the symptoms which are most concerning that necessitate immediate return. Pt symptoms have been well controlled here and the patient is safe for discharge with appropriate outpatient follow up. The patient has verbalized understanding to return to ER without delay for new or worsening pains or for any other symptoms or concerns. I utilized the discharge clinical management tool provided [employer] to help estimate risk of negative outcome for this patient.      Disposition:  ED Prescriptions       Medication Sig Dispense Start Date End Date Auth. Provider    oxyCODONE (Roxicodone) 5 mg immediate release tablet Take 1 tablet (5 mg) by mouth every 6 hours if needed for severe pain (7 - 10) for up to 3 days. 12 tablet 11/23/2024 11/26/2024 Kaz Goncalves PA-C     ibuprofen 800 mg tablet Take 1 tablet (800 mg) by mouth 3 times a day for 7 days. 21 tablet 11/23/2024 11/30/2024 Kaz Goncalves PA-C    cyclobenzaprine (Flexeril) 10 mg tablet Take 1 tablet (10 mg) by mouth 2 times a day as needed for muscle spasms for up to 10 days. 20 tablet 11/23/2024 12/3/2024 Kaz Goncalves PA-C    predniSONE (Deltasone) 50 mg tablet Take 1 tablet (50 mg) by mouth once daily for 5 days. 5 tablet 11/23/2024 11/28/2024 Kaz Goncalves PA-C              Procedure  Procedures     Kaz Goncalves PA-C  11/24/24 0016

## 2024-11-25 ENCOUNTER — HOME CARE VISIT (OUTPATIENT)
Dept: HOME HEALTH SERVICES | Facility: HOME HEALTH | Age: 29
End: 2024-11-25
Payer: MEDICAID

## 2024-11-25 VITALS
RESPIRATION RATE: 16 BRPM | HEART RATE: 73 BPM | SYSTOLIC BLOOD PRESSURE: 118 MMHG | OXYGEN SATURATION: 98 % | DIASTOLIC BLOOD PRESSURE: 60 MMHG

## 2024-11-25 PROCEDURE — G0157 HHC PT ASSISTANT EA 15: HCPCS

## 2024-11-25 RX ORDER — OXYCODONE HYDROCHLORIDE 5 MG/1
5-10 TABLET ORAL EVERY 6 HOURS PRN
Qty: 28 TABLET | Refills: 0 | Status: SHIPPED | OUTPATIENT
Start: 2024-11-25 | End: 2024-12-02

## 2024-11-25 RX ORDER — TRAMADOL HYDROCHLORIDE 50 MG/1
50-100 TABLET ORAL EVERY 6 HOURS PRN
Qty: 40 TABLET | Refills: 0 | Status: SHIPPED | OUTPATIENT
Start: 2024-11-25 | End: 2024-12-02

## 2024-11-25 SDOH — HEALTH STABILITY: PHYSICAL HEALTH: EXERCISE ACTIVITY: APS, QS, GS , HIP ABD, HS

## 2024-11-25 SDOH — HEALTH STABILITY: PHYSICAL HEALTH: EXERCISE TYPE: RLE EXS

## 2024-11-25 SDOH — HEALTH STABILITY: PHYSICAL HEALTH: EXERCISE ACTIVITIES SETS: 1

## 2024-11-25 SDOH — HEALTH STABILITY: PHYSICAL HEALTH: PHYSICAL EXERCISE: 10

## 2024-11-25 SDOH — HEALTH STABILITY: PHYSICAL HEALTH: PHYSICAL EXERCISE: SUPINE

## 2024-11-25 SDOH — HEALTH STABILITY: PHYSICAL HEALTH: PHYSICAL EXERCISE: SEATED

## 2024-11-25 SDOH — HEALTH STABILITY: PHYSICAL HEALTH: EXERCISE ACTIVITY: LAQS, APS

## 2024-11-25 ASSESSMENT — ACTIVITIES OF DAILY LIVING (ADL)
AMBULATION ASSISTANCE ON FLAT SURFACES: 1
AMBULATION_DISTANCE/DURATION_TOLERATED: 100'
AMBULATION ASSISTANCE: 1
CURRENT_FUNCTION: STAND BY ASSIST
PHYSICAL TRANSFERS ASSESSED: 1
AMBULATION ASSISTANCE: SUPERVISION
CURRENT_FUNCTION: SUPERVISION
PHYSICAL_TRANSFERS_DEVICES: FWW

## 2024-11-25 ASSESSMENT — ENCOUNTER SYMPTOMS
PAIN LOCATION - PAIN QUALITY: ACHING
PAIN LOCATION - PAIN FREQUENCY: INTERMITTENT
PAIN LOCATION: RIGHT HIP
PAIN LOCATION - PAIN SEVERITY: 6/10

## 2024-11-27 ENCOUNTER — HOME CARE VISIT (OUTPATIENT)
Dept: HOME HEALTH SERVICES | Facility: HOME HEALTH | Age: 29
End: 2024-11-27
Payer: MEDICAID

## 2024-11-27 PROCEDURE — G0157 HHC PT ASSISTANT EA 15: HCPCS

## 2024-11-28 VITALS
TEMPERATURE: 99 F | SYSTOLIC BLOOD PRESSURE: 130 MMHG | RESPIRATION RATE: 16 BRPM | OXYGEN SATURATION: 96 % | DIASTOLIC BLOOD PRESSURE: 64 MMHG | HEART RATE: 81 BPM

## 2024-11-28 SDOH — HEALTH STABILITY: PHYSICAL HEALTH: PHYSICAL EXERCISE: 10

## 2024-11-28 SDOH — HEALTH STABILITY: PHYSICAL HEALTH: EXERCISE ACTIVITY: APS, LAQS, HIP ADD

## 2024-11-28 SDOH — HEALTH STABILITY: PHYSICAL HEALTH: EXERCISE TYPE: RLE THA EXS

## 2024-11-28 SDOH — HEALTH STABILITY: PHYSICAL HEALTH: EXERCISE ACTIVITIES SETS: 1

## 2024-11-28 SDOH — HEALTH STABILITY: PHYSICAL HEALTH: PHYSICAL EXERCISE: STANDING

## 2024-11-28 SDOH — HEALTH STABILITY: PHYSICAL HEALTH: EXERCISE COMMENTS: ADDED STANDING EXS TO HEP, INST ON CORRECT FORM OF THER EXS

## 2024-11-28 SDOH — HEALTH STABILITY: PHYSICAL HEALTH: EXERCISE ACTIVITY: CALF RAISES, MARCHES, HIP ABD

## 2024-11-28 SDOH — HEALTH STABILITY: PHYSICAL HEALTH: PHYSICAL EXERCISE: SEATED

## 2024-11-28 SDOH — HEALTH STABILITY: PHYSICAL HEALTH: EXERCISE ACTIVITY: STANDING  L CALF STRETCH

## 2024-11-28 ASSESSMENT — ENCOUNTER SYMPTOMS
PAIN LOCATION: RIGHT LEG
PAIN LOCATION - PAIN FREQUENCY: INTERMITTENT
PAIN LOCATION - PAIN QUALITY: ACHING
PAIN LOCATION - PAIN SEVERITY: 6/10
LOWEST PAIN SEVERITY IN PAST 24 HOURS: 4/10
PAIN LOCATION - RELIEVING FACTORS: PAIN MEDS
PAIN LOCATION - PAIN QUALITY: SHARP
PAIN LOCATION: RIGHT HIP
PAIN LOCATION - PAIN SEVERITY: 6/10
HIGHEST PAIN SEVERITY IN PAST 24 HOURS: 8/10
PAIN SEVERITY GOAL: 0/10
SUBJECTIVE PAIN PROGRESSION: UNCHANGED
PAIN LOCATION - PAIN FREQUENCY: INTERMITTENT

## 2024-11-28 ASSESSMENT — ACTIVITIES OF DAILY LIVING (ADL)
CURRENT_FUNCTION: SUPERVISION
AMBULATION_DISTANCE/DURATION_TOLERATED: 110'
PHYSICAL_TRANSFERS_DEVICES: FWW
AMBULATION ASSISTANCE: STAND BY ASSIST
AMBULATION ASSISTANCE: SUPERVISION
AMBULATION ASSISTANCE: 1
PHYSICAL TRANSFERS ASSESSED: 1
AMBULATION ASSISTANCE ON FLAT SURFACES: 1

## 2024-12-02 ENCOUNTER — HOME CARE VISIT (OUTPATIENT)
Dept: HOME HEALTH SERVICES | Facility: HOME HEALTH | Age: 29
End: 2024-12-02
Payer: MEDICAID

## 2024-12-02 VITALS
RESPIRATION RATE: 16 BRPM | OXYGEN SATURATION: 98 % | DIASTOLIC BLOOD PRESSURE: 60 MMHG | HEART RATE: 84 BPM | TEMPERATURE: 97.9 F | SYSTOLIC BLOOD PRESSURE: 118 MMHG

## 2024-12-02 SDOH — HEALTH STABILITY: PHYSICAL HEALTH: EXERCISE ACTIVITY: CALF RAISES, KNEE FLEX, MIINI SQUATS, HIP ABD

## 2024-12-02 SDOH — HEALTH STABILITY: PHYSICAL HEALTH: PHYSICAL EXERCISE: STANDING

## 2024-12-02 SDOH — HEALTH STABILITY: PHYSICAL HEALTH: PHYSICAL EXERCISE: 20

## 2024-12-02 SDOH — HEALTH STABILITY: PHYSICAL HEALTH: PHYSICAL EXERCISE: SEATED

## 2024-12-02 SDOH — HEALTH STABILITY: PHYSICAL HEALTH: EXERCISE ACTIVITY: BUE AROM EXS

## 2024-12-02 SDOH — HEALTH STABILITY: PHYSICAL HEALTH: EXERCISE ACTIVITY: LAQS

## 2024-12-02 ASSESSMENT — ENCOUNTER SYMPTOMS
PAIN LOCATION: BACK
HIGHEST PAIN SEVERITY IN PAST 24 HOURS: 7/10
PAIN LOCATION - PAIN FREQUENCY: INTERMITTENT
LOWEST PAIN SEVERITY IN PAST 24 HOURS: 3/10
PAIN LOCATION - PAIN SEVERITY: 5/10
PAIN SEVERITY GOAL: 0/10
SUBJECTIVE PAIN PROGRESSION: GRADUALLY IMPROVING

## 2024-12-02 ASSESSMENT — ACTIVITIES OF DAILY LIVING (ADL)
AMBULATION ASSISTANCE: 1
PHYSICAL TRANSFERS ASSESSED: 1

## 2024-12-04 ENCOUNTER — HOME CARE VISIT (OUTPATIENT)
Dept: HOME HEALTH SERVICES | Facility: HOME HEALTH | Age: 29
End: 2024-12-04
Payer: MEDICAID

## 2024-12-04 VITALS
DIASTOLIC BLOOD PRESSURE: 65 MMHG | HEART RATE: 95 BPM | RESPIRATION RATE: 16 BRPM | SYSTOLIC BLOOD PRESSURE: 124 MMHG | TEMPERATURE: 96.3 F | OXYGEN SATURATION: 98 %

## 2024-12-04 PROCEDURE — G0157 HHC PT ASSISTANT EA 15: HCPCS

## 2024-12-04 SDOH — HEALTH STABILITY: PHYSICAL HEALTH: PHYSICAL EXERCISE: STANDING

## 2024-12-04 SDOH — HEALTH STABILITY: PHYSICAL HEALTH: EXERCISE TYPE: RLE THA EXS

## 2024-12-04 SDOH — HEALTH STABILITY: PHYSICAL HEALTH: EXERCISE ACTIVITY: CALF RAISES, MARCHES , HIP ABD, /EXT, KNEE FLEX

## 2024-12-04 SDOH — HEALTH STABILITY: PHYSICAL HEALTH: PHYSICAL EXERCISE: 15

## 2024-12-04 SDOH — HEALTH STABILITY: PHYSICAL HEALTH: EXERCISE ACTIVITIES SETS: 1

## 2024-12-04 SDOH — HEALTH STABILITY: PHYSICAL HEALTH: EXERCISE ACTIVITY: APS,  LAQS, HIP ADD WITH PILLOW

## 2024-12-04 SDOH — HEALTH STABILITY: PHYSICAL HEALTH: PHYSICAL EXERCISE: SEATED

## 2024-12-04 ASSESSMENT — ENCOUNTER SYMPTOMS
PAIN LOCATION - PAIN SEVERITY: 5/10
PAIN LOCATION: RIGHT HIP
PAIN LOCATION - PAIN FREQUENCY: INTERMITTENT
PAIN: 1
PAIN SEVERITY GOAL: 0/10
PAIN LOCATION - PAIN QUALITY: ACHING
PAIN LOCATION - EXACERBATING FACTORS: ACTIVITY
PERSON REPORTING PAIN: PATIENT
HIGHEST PAIN SEVERITY IN PAST 24 HOURS: 6/10
LOWEST PAIN SEVERITY IN PAST 24 HOURS: 0/10

## 2024-12-04 ASSESSMENT — ACTIVITIES OF DAILY LIVING (ADL)
AMBULATION ASSISTANCE ON FLAT SURFACES: 1
AMBULATION_DISTANCE/DURATION_TOLERATED: 150'
CURRENT_FUNCTION: INDEPENDENT
PHYSICAL TRANSFERS ASSESSED: 1
AMBULATION ASSISTANCE: SUPERVISION
PHYSICAL_TRANSFERS_DEVICES: FWW
AMBULATION ASSISTANCE: 1

## 2024-12-09 ENCOUNTER — HOME CARE VISIT (OUTPATIENT)
Dept: HOME HEALTH SERVICES | Facility: HOME HEALTH | Age: 29
End: 2024-12-09
Payer: MEDICAID

## 2024-12-09 VITALS — DIASTOLIC BLOOD PRESSURE: 94 MMHG | TEMPERATURE: 98.2 F | SYSTOLIC BLOOD PRESSURE: 120 MMHG

## 2024-12-09 PROCEDURE — G0151 HHCP-SERV OF PT,EA 15 MIN: HCPCS

## 2024-12-09 ASSESSMENT — ENCOUNTER SYMPTOMS
PAIN LOCATION - PAIN SEVERITY: 0/10
PERSON REPORTING PAIN: PATIENT
LOWEST PAIN SEVERITY IN PAST 24 HOURS: 0/10
PAIN: 1
HIGHEST PAIN SEVERITY IN PAST 24 HOURS: 5/10
PAIN LOCATION: RIGHT HIP

## 2024-12-09 ASSESSMENT — ACTIVITIES OF DAILY LIVING (ADL)
HOME_HEALTH_OASIS: 00
OASIS_M1830: 01

## 2024-12-17 ENCOUNTER — APPOINTMENT (OUTPATIENT)
Dept: ORTHOPEDIC SURGERY | Facility: CLINIC | Age: 29
End: 2024-12-17
Payer: MEDICAID

## 2024-12-26 ENCOUNTER — APPOINTMENT (OUTPATIENT)
Dept: ORTHOPEDIC SURGERY | Facility: CLINIC | Age: 29
End: 2024-12-26
Payer: MEDICAID

## 2024-12-27 ENCOUNTER — HOSPITAL ENCOUNTER (OUTPATIENT)
Dept: RADIOLOGY | Facility: HOSPITAL | Age: 29
Discharge: HOME | End: 2024-12-27
Payer: MEDICAID

## 2024-12-27 ENCOUNTER — OFFICE VISIT (OUTPATIENT)
Dept: ORTHOPEDIC SURGERY | Facility: HOSPITAL | Age: 29
End: 2024-12-27
Payer: MEDICAID

## 2024-12-27 DIAGNOSIS — Z96.641 S/P TOTAL RIGHT HIP ARTHROPLASTY: ICD-10-CM

## 2024-12-27 PROCEDURE — 99211 OFF/OP EST MAY X REQ PHY/QHP: CPT | Performed by: STUDENT IN AN ORGANIZED HEALTH CARE EDUCATION/TRAINING PROGRAM

## 2024-12-27 PROCEDURE — 1036F TOBACCO NON-USER: CPT | Performed by: STUDENT IN AN ORGANIZED HEALTH CARE EDUCATION/TRAINING PROGRAM

## 2024-12-27 PROCEDURE — 73502 X-RAY EXAM HIP UNI 2-3 VIEWS: CPT | Mod: RT

## 2024-12-27 NOTE — PROGRESS NOTES
Diagnosis: Posttraumatic osteoarthritis of the right hip  Procedure Performed: Right total hip arthroplasty, excision of heterotopic bone  Date of Surgery: November 18, 2024    Subjective:  Fauzia Patel is here for regularly scheduled follow-up after the above procedure. The patient has no specific complaints other than occasional discomfort. The patient has weaned off all narcotic pain medicine, continues to use NSAID'S/Tylenol and ice as needed.  Fauzia Patel has been compliant with ROLANDA compression stocking as prescribed. They have been working with home physical therapy and have not progressed to outpatient PT. The patient is ambulatory with a cane. The patient denies: fevers, chills, incisional drainage, joint instability, chest or calf pain, shortness of breath, and night sweats. The patient has been compliant with their prescribed aspirin for VTE prophylaxis. There are no complaints of numbness or tingling in the operative extremity.     A few days after the surgery, the patient had a pillow under her back.  She twisted and felt a large pop in her back.  Since then, she has had some radicular pain in her right lower extremity.  It is not in the area of the groin but it does extend down the posterior aspect of her leg into her shin.    Physical Examination:   General: Patient is alert and oriented x 3 and appears comfortable.  Gait: Patient ambulates with slight antalgic gait.  Wound: Incision is well healed. There is mild warmth and effusion about the hip, both consistent with the normal post-operative healing. There is no erythema, incisional drainage, fluctuance, or suture abscess.   Hip Exam: ROM deferred given acute post-operative state but no pain with log roll   Knee: Painless ROM of the knee.   Calf: No swelling or tenderness  Able to dorsi-flex and plantar-flex the ankle with appropriate strength. Able to wiggle all toes.   The foot is warm and well perfused with palpable pulses.   Sensation is  intact to light touch.     Radiographs: AP Pelvis: Right total hip arthroplasty in place. There is no evidence of subsidence, fracture or dislocation. The joint is located. Compared to immediate post-operative x-rays, no change in appearance.     Assessment and Plan: The patient is progressing well approximately 5 weeks s/p right total hip arthroplasty.  I am pleased with her progress this point.  She can weight-bear as tolerated.    1. A thorough discussion was had with the patient concerning the postoperative course and the patient is in agreement with the plan.  2. Continued physical therapy with gait training to improve strength and stamina. Progress off support as tolerated. We reviewed posterior hip precautions. They will remain in place for a total of three months.   3. Tylenol as needed/tolerated for pain and stiffness.  4. Reviewed the need for prophylactic antibiotics prior to any dental or other invasive procedures.  5.  If the patient continues to have radicular type symptoms at her 3-month visit, we will obtain an MRI of the lumbar spine to assess for a disc herniation.     6. Follow-up in 6 weeks with radiographs or sooner if there are any concerns.      *This office note was dictated using Dragon voice to text software and was not proofread for spelling or grammatical errors *

## 2024-12-30 ENCOUNTER — EVALUATION (OUTPATIENT)
Dept: PHYSICAL THERAPY | Facility: CLINIC | Age: 29
End: 2024-12-30
Payer: MEDICAID

## 2024-12-30 ENCOUNTER — APPOINTMENT (OUTPATIENT)
Dept: PHYSICAL THERAPY | Facility: CLINIC | Age: 29
End: 2024-12-30
Payer: MEDICAID

## 2024-12-30 DIAGNOSIS — M16.51 POST-TRAUMATIC OSTEOARTHRITIS OF RIGHT HIP: ICD-10-CM

## 2024-12-30 DIAGNOSIS — M25.551 RIGHT HIP PAIN: Primary | ICD-10-CM

## 2024-12-30 PROCEDURE — 97161 PT EVAL LOW COMPLEX 20 MIN: CPT | Mod: GP | Performed by: PHYSICAL THERAPIST

## 2024-12-30 SDOH — ECONOMIC STABILITY: GENERAL: QUALITY OF LIFE: GOOD

## 2024-12-30 ASSESSMENT — ENCOUNTER SYMPTOMS
ALLEVIATING FACTORS: ICE
PAIN SCALE: 5
PAIN SCALE AT LOWEST: 3
PAIN SCALE AT HIGHEST: 5

## 2024-12-30 ASSESSMENT — ACTIVITIES OF DAILY LIVING (ADL): POOR_BALANCE: 1

## 2024-12-30 NOTE — PROGRESS NOTES
Physical Therapy Evaluation and Treatment     Patient Name: Fauzia Patel  MRN: [id number]  Encounter date: 2024  Time Calculation  Start Time: 1310  Stop Time: 1345  Time Calculation (min): 35 min  PT Evaluation Time Entry  PT Evaluation (Low) Time Entry: 35  Low complexity due to patient's clinical presentation being stable and uncomplicated by any significant comorbidities that may affect rehab tolerance and progression.     Visit # 1 of 13  Visits/Dates Authorized: EVAL ONLY - [facility] COMM PLAN [facility] - REQ AUTH / 100% COVERAGE / 30V/YR/THERAPY   Insurance Type: Payor: [employer] KAREL / Plan: [employer] PLAN / Product Type: *No Product type* /     Current Problem:   Problem List Items Addressed This Visit    None  Visit Diagnoses         Codes    Right hip pain    -  Primary M25.551    Relevant Orders    Follow Up In Physical Therapy    Post-traumatic osteoarthritis of right hip     M16.51    Relevant Orders    Follow Up In Physical Therapy          Precautions:  Precautions  Post-Surgical Precautions: Right hip precautions       Subjective    Subjective Evaluation    History of Present Illness  Date of surgery: 2024  Mechanism of injury: Pt presents to therapy s/p R MESSI conversion from prior hip surgery years prior. Surgery was performed by Dr. Rios on 2024, pt participated in HHPT post surgery. She reports things are going well, she is having some pain more soreness. She is still on her precautions from surgery. She has some numbness in her feet but she does have a history of sciatica. She is no longer utilizing a cane and was told that the limping is normal after surgery.   She is not currently working until February due to post-op status. Works as a .     Quality of life: good    Pain  Current pain ratin  At best pain rating: 3  At worst pain ratin  Relieving factors: ice    Social Support  Lives in: one-story house ([place]anch home with only one  step in the home)  Lives with: young children    Treatments  Discharged from (in last 30 days): home health care  Patient Goals  Patient goals for therapy: increased strength, decreased pain, improved balance, increased motion and return to work  Patient goal: Her end goal is to play volleyball         Objective      Objective     Palpation     Right Tenderness of the TFL.     Additional Palpation Details  Hypertonicity/tension throughout R proixmal quad region      Lumbar Screen  Lumbar range of motion within normal limits.    Active Range of Motion     Additional Active Range of Motion Details  R hip ROM within limits of post-op status    Strength/Myotome Testing     Additional Strength Details  R hip within post-op status grossly 4+/5 in modified testing position     Ambulation     Ambulation: Stairs   Ascend stairs: independent  Pattern: reciprocal  Railings: two rails  Descend stairs: independent  Pattern: reciprocal  Railings: two rails    Additional Stairs Ambulation Details  Reduced concentric control     Observational Gait   Gait: antalgic   Decreased walking speed, stride length and right stance time.   Right foot contact pattern: foot flat  Right arm swing: decreased    Quality of Movement During Gait   Trunk    Trunk (Left): Positive left lateral lean over stance limb.     Functional Assessment     Comments  Transfers: shift away from R LE with straight leg          Outcome Measures:  Other Measures  Lower Extremity Funtional Score (LEFS): 25     Treatments:  Therapeutic Activity  Therapeutic Activity Performed: Yes  Therapeutic Activity 1: Educated on post-op precautions  Therapeutic Activity 2: Discussed activities that will and/or won't be advised with current operation including activities like jumping and running; discussed biking, hiking instead    HEP / Access Codes: Access Code: [email or web address]  URL: [email or web address]  Date: 12/30/2024  Prepared by: Laila Hurtado    Exercises  - Standing  Weight Shift  - 1-2 x daily - 7 x weekly - 3 sets - 10 reps  - Staggered Stance Forward Backward Weight Shift with Unilateral Counter Support  - 1-2 x daily - 7 x weekly - 3 sets - 10 reps  - Sit to Stand  - 1 x daily - 7 x weekly - 3 sets - 10 reps  - Mini Squat with Counter Support  - 1 x daily - 7 x weekly - 3 sets - 10 reps    Assessment   Assessment & Plan     Assessment  Impairments: abnormal gait, abnormal muscle firing, abnormal muscle tone, abnormal or restricted ROM, activity intolerance, impaired balance, impaired physical strength, lacks appropriate home exercise program and pain with function  Assessment details: Pt is a 29 y.o female presenting to therapy s/p R MESSI on 11/18/2024 due to required revision from prior surgery. Pt demonstrating high pain levels creating functional limitations of ambulation, transfers and bed mobility. Large difficulty with full WB throughout R hip with compensation of lateral shift of trunk and reduced step/stride. Functional weakness was noted during transfers as well with again significant shift away from surgical leg. At this time pt would benefit from skilled physical therapy in order to prevent further functional decline and achieve full optimal post-op healing.    Prognosis: good    Goals  Her end goal is to play volleyball    Plan  Therapy options: will be seen for skilled physical therapy services  Planned modality interventions: low level laser therapy, TENS and electrical stimulation/Russian stimulation  Other planned modality interventions: KT tape/Dry needling/Cupping  Planned therapy interventions: abdominal trunk stabilization, manual therapy, motor coordination training, balance/weight-bearing training, muscle pump exercises, bed mobility training, neuromuscular re-education, body mechanics training, postural training, fine motor coordination training, soft tissue mobilization, flexibility, spinal/joint mobilization, functional ROM exercises, strengthening,  gait training, stretching, therapeutic activities, home exercise program, transfer training and joint mobilization  Frequency: 2x/week for 4 weeks; 1x/week for 4 weeks.  Duration in visits: 12  Duration in weeks: 8  Treatment plan discussed with: patient           Goals:   Active       PT Problem       Pt will be 50% IND with HEP in 4 weeks in order to progress with therapy.        Start:  12/30/24    Expected End:  02/03/25            Pt will be able to perform ascending reciprocal pattern of 12 steps with use of 0 rail in 4 weeks for home and community ambulation needs.         Start:  12/30/24    Expected End:  02/03/25            Pt will be able to perform household ambulation distances demonstrating normalized taya with equal weight bearing in 4 weeks for short distance ambulation needs.        Start:  12/30/24    Expected End:  02/03/25            Pt will be able to perform sit to stand from normal chair height without UE assist in 4 weeks to improve transfers needs.        Start:  12/30/24    Expected End:  02/03/25               PT Problem       Pt will be 100% IND with HEP in 8 weeks in order to maintain progress with therapy.         Start:  12/30/24    Expected End:  03/05/25            Pt will reduce pain levels to no more than 0/10 in 8 weeks in order to improve transfers,  and ambulation        Start:  12/30/24    Expected End:  03/05/25            Pt will be able to perform community ambulation demonstrating normalized taya on uneven surface in 8 weeks for home and recreational needs.        Start:  12/30/24    Expected End:  03/05/25            Pt will demonstrate subjective improvement of ADLs and recreational activities through improved score of 75 on LEFS in 8 weeks for full optimal post-op healing.        Start:  12/30/24    Expected End:  03/05/25

## 2025-01-02 ENCOUNTER — TREATMENT (OUTPATIENT)
Dept: PHYSICAL THERAPY | Facility: CLINIC | Age: 30
End: 2025-01-02
Payer: MEDICAID

## 2025-01-02 DIAGNOSIS — M25.551 RIGHT HIP PAIN: ICD-10-CM

## 2025-01-02 DIAGNOSIS — M16.51 POST-TRAUMATIC OSTEOARTHRITIS OF RIGHT HIP: ICD-10-CM

## 2025-01-02 PROCEDURE — 97110 THERAPEUTIC EXERCISES: CPT | Mod: GP,CQ

## 2025-01-02 ASSESSMENT — PAIN - FUNCTIONAL ASSESSMENT: PAIN_FUNCTIONAL_ASSESSMENT: 0-10

## 2025-01-02 ASSESSMENT — PAIN SCALES - GENERAL: PAINLEVEL_OUTOF10: 7

## 2025-01-07 ENCOUNTER — APPOINTMENT (OUTPATIENT)
Dept: PHYSICAL THERAPY | Facility: CLINIC | Age: 30
End: 2025-01-07
Payer: MEDICAID

## 2025-01-09 ENCOUNTER — DOCUMENTATION (OUTPATIENT)
Dept: PHYSICAL THERAPY | Facility: CLINIC | Age: 30
End: 2025-01-09
Payer: MEDICAID

## 2025-01-09 ENCOUNTER — APPOINTMENT (OUTPATIENT)
Dept: PHYSICAL THERAPY | Facility: CLINIC | Age: 30
End: 2025-01-09
Payer: MEDICAID

## 2025-01-09 NOTE — PROGRESS NOTES
Physical Therapy                 Therapy Communication Note    Patient Name: Fauzia Patel  MRN: [id number]  Department:   Room: Room/bed info not found  Today's Date: 1/9/2025     Discipline: Physical Therapy          Missed Visit Reason:      Missed Time: Cancel    Comment: No reason given.

## 2025-01-16 ENCOUNTER — APPOINTMENT (OUTPATIENT)
Dept: PHYSICAL THERAPY | Facility: CLINIC | Age: 30
End: 2025-01-16
Payer: MEDICAID

## 2025-01-16 NOTE — PROGRESS NOTES
Physical Therapy Treatment    Patient Name: Fauzia Patel  MRN: [id number]  Today's Date: 1/16/2025                          Payor: [facility] KAREL / Plan: [facility] PLAN / Product Type: *No Product type* /          Current Problem:  Problem List Items Addressed This Visit    None      Subjective   s/p R MESSI conversion from prior hip surgery years prior. Surgery was performed by Dr. Rios on 11/18/2024     HEP compliance: ***    Pain:     Pain location: ***    Precautions:         Objective   No objective measures taken this visit    Treatment:    Therapeutic exercise    NuStep level 5 for 8'  Resisted STS 10x2 GTB  H/L RLE resisted hip ABD 10x3  Supine hip/knee flex to 90 RLE 10x3  Lateral stepping @ counter (no resistance)  Standing RLE HS curls 10x3  Eccentric calf raises 10x3    Neuromuscular Re-education       Manual       Modalities      HEP      Assessment      Plan  Continue to progress POC as tolerated by patient to improve strength, mobility and overall function    Goals:  Active       PT Problem       Pt will be 50% IND with HEP in 4 weeks in order to progress with therapy.        Start:  12/30/24    Expected End:  02/03/25            Pt will be able to perform ascending reciprocal pattern of 12 steps with use of 0 rail in 4 weeks for home and community ambulation needs.         Start:  12/30/24    Expected End:  02/03/25            Pt will be able to perform household ambulation distances demonstrating normalized taya with equal weight bearing in 4 weeks for short distance ambulation needs.        Start:  12/30/24    Expected End:  02/03/25            Pt will be able to perform sit to stand from normal chair height without UE assist in 4 weeks to improve transfers needs.        Start:  12/30/24    Expected End:  02/03/25               PT Problem       Pt will be 100% IND with HEP in 8 weeks in order to maintain progress with therapy.         Start:  12/30/24     Expected End:  03/05/25            Pt will reduce pain levels to no more than 0/10 in 8 weeks in order to improve transfers,  and ambulation        Start:  12/30/24    Expected End:  03/05/25            Pt will be able to perform community ambulation demonstrating normalized taya on uneven surface in 8 weeks for home and recreational needs.        Start:  12/30/24    Expected End:  03/05/25            Pt will demonstrate subjective improvement of ADLs and recreational activities through improved score of 75 on LEFS in 8 weeks for full optimal post-op healing.        Start:  12/30/24    Expected End:  03/05/25

## 2025-01-23 ENCOUNTER — TREATMENT (OUTPATIENT)
Dept: PHYSICAL THERAPY | Facility: CLINIC | Age: 30
End: 2025-01-23
Payer: MEDICAID

## 2025-01-23 DIAGNOSIS — M25.551 RIGHT HIP PAIN: ICD-10-CM

## 2025-01-23 DIAGNOSIS — M16.51 POST-TRAUMATIC OSTEOARTHRITIS OF RIGHT HIP: ICD-10-CM

## 2025-01-23 PROCEDURE — 97110 THERAPEUTIC EXERCISES: CPT | Mod: GP | Performed by: PHYSICAL THERAPIST

## 2025-01-23 NOTE — PROGRESS NOTES
"Physical Therapy    Physical Therapy Treatment    Patient Name: Fauzia Patel  MRN: [id number]  Today's Date: 1/23/2025  Visit #3  Time Calculation  Start Time: 1400  Stop Time: 1444  Time Calculation (min): 44 min     PT Therapeutic Procedures Time Entry  Therapeutic Exercise Time Entry: 44        Payor: [facility] KAREL / Plan: [facility] PLAN / Product Type: *No Product type* /   Referred by:Bay Rios  Current Problem  Problem List Items Addressed This Visit             ICD-10-CM    Post-traumatic osteoarthritis of right hip M16.51    Right hip pain M25.551        Subjective   Pt reports she is doing well.    Pain: Better  Pt has been compliant with HEP Yes      Objective  No objective measures assessed this visit    Assessment:  Pt is 9 wks  post op R MESSI.  PT was evaled at another facility and needed to transfer due to living arrangements.      The pt required min to mod cues and demonstration to complete exercises with proper form.    Pt responded to all activities without adverse effects.    Pt continues to lack strength necessary for the completion of baseline ADLs without pain.  Pt will benefit from further therapy to continue to progress towards meeting functional and impairment goals.     Plan:  Continue to progress treatment to improve strength, range of motion, joint mobility, pain, and flexibility impairments which are impacting patient's function.    Treatments:  Visit # 3    Eval date:12/30/24  Re-check due on:  Auth:2 F/U VISITS  /  1-13-25 3-6-25  /  [id number], [id number], [id number], [id number], [id number] ONLY   Precautions: THR 11/18/24 post precautions (FADIR) for 3 months  Therapeutic ex:  Recumbent bike 5 min  Standing HS stretch 1 min ea  Step stretch 1 min ea  HR/TR on half roll x 2 0 ea  Step ups x 10 6\"  Standing hip ext, abd #3 x 20  Standing HS 3# x 20  Supine hooklying isometric hip add and abd 5 sec x2 min  Neuro Re-ed:  Airex march 2 min  Airex staggered stance 1 " min  Manual:  Pt educated regarding manual therapy risks and benefits.  Pt given opportunity to stop if needed.  Pt aware and agrees.     Goals:  Active       PT Problem       Pt will be 50% IND with HEP in 4 weeks in order to progress with therapy.        Start:  12/30/24    Expected End:  02/03/25            Pt will be able to perform ascending reciprocal pattern of 12 steps with use of 0 rail in 4 weeks for home and community ambulation needs.         Start:  12/30/24    Expected End:  02/03/25            Pt will be able to perform household ambulation distances demonstrating normalized taya with equal weight bearing in 4 weeks for short distance ambulation needs.        Start:  12/30/24    Expected End:  02/03/25            Pt will be able to perform sit to stand from normal chair height without UE assist in 4 weeks to improve transfers needs.        Start:  12/30/24    Expected End:  02/03/25               PT Problem       Pt will be 100% IND with HEP in 8 weeks in order to maintain progress with therapy.         Start:  12/30/24    Expected End:  03/05/25            Pt will reduce pain levels to no more than 0/10 in 8 weeks in order to improve transfers,  and ambulation        Start:  12/30/24    Expected End:  03/05/25            Pt will be able to perform community ambulation demonstrating normalized taya on uneven surface in 8 weeks for home and recreational needs.        Start:  12/30/24    Expected End:  03/05/25            Pt will demonstrate subjective improvement of ADLs and recreational activities through improved score of 75 on LEFS in 8 weeks for full optimal post-op healing.        Start:  12/30/24    Expected End:  03/05/25

## 2025-01-28 ENCOUNTER — TREATMENT (OUTPATIENT)
Dept: PHYSICAL THERAPY | Facility: CLINIC | Age: 30
End: 2025-01-28
Payer: MEDICAID

## 2025-01-28 DIAGNOSIS — M25.551 RIGHT HIP PAIN: ICD-10-CM

## 2025-01-28 DIAGNOSIS — M16.51 POST-TRAUMATIC OSTEOARTHRITIS OF RIGHT HIP: ICD-10-CM

## 2025-01-28 PROCEDURE — 97112 NEUROMUSCULAR REEDUCATION: CPT | Mod: GP | Performed by: PHYSICAL THERAPIST

## 2025-01-28 PROCEDURE — 97110 THERAPEUTIC EXERCISES: CPT | Mod: GP | Performed by: PHYSICAL THERAPIST

## 2025-01-28 NOTE — PROGRESS NOTES
"Physical Therapy    Physical Therapy Treatment    Patient Name: Fauzia Patel  MRN: [id number]  Today's Date: 1/28/2025  Visit #4  Time Calculation  Start Time: 1232  Stop Time: 1316  Time Calculation (min): 44 min     PT Therapeutic Procedures Time Entry  Neuromuscular Re-Education Time Entry: 10  Therapeutic Exercise Time Entry: 34        Payor: [facility] KAREL / Plan: [facility] PLAN / Product Type: *No Product type* /   Referred by:Bay Rios  Current Problem  Problem List Items Addressed This Visit             ICD-10-CM    Post-traumatic osteoarthritis of right hip M16.51    Right hip pain M25.551        Subjective   Pt reports she is hurting a little  Pain: No change  Pt has been compliant with HEP Yes      Objective  No objective measures assessed this visit    Assessment:  Pt is progressing as expected towards goals. A little more pain today.  Advised diet changes for improve bone health  This session exercises were progressed (p) or added (NEW) as documented in the treatment section.  The pt required min to mod cues and demonstration to complete exercises with proper form.    Pt responded to all activities without adverse effects.    Pt continues to lack strength necessary for the completion of baseline ADLs without pain.  Pt will benefit from further therapy to continue to progress towards meeting functional and impairment goals.    Plan:  Continue to progress treatment to improve strength, range of motion, joint mobility, pain, and flexibility impairments which are impacting patient's function.    Treatments:  Visit # 4  Eval date:12/30/24  Re-check due on:  Auth:12 F/U VISITS  /  1-13-25 3-6-25  /  [id number], 21339, 00609, [id number], 90545 ONLY   Precautions: THR 11/18/24 post precautions (FADIR) for 3 months  Therapeutic ex:  Recumbent bike 5 min  Standing HS stretch 1 min ea  Step stretch 1 min ea  HR/TR on half roll x 2 0 ea  Step ups x 10 6\"  Standing hip ext, abd #3 " x 20  Standing HS 3# x 20  Supine hooklying isometric hip add and abd 5 sec x2 min  SLR flex , abd x 20 ea  Clamshells x 20  Sidelying hip abd x 20  Neuro Re-ed:  Airex march 2 min  Airex staggered stance 1 min  Manual:  Pt educated regarding manual therapy risks and benefits.  Pt given opportunity to stop if needed.  Pt aware and agrees.   Goals:  Active       PT Problem       Pt will be 50% IND with HEP in 4 weeks in order to progress with therapy.        Start:  12/30/24    Expected End:  02/03/25            Pt will be able to perform ascending reciprocal pattern of 12 steps with use of 0 rail in 4 weeks for home and community ambulation needs.         Start:  12/30/24    Expected End:  02/03/25            Pt will be able to perform household ambulation distances demonstrating normalized taya with equal weight bearing in 4 weeks for short distance ambulation needs.        Start:  12/30/24    Expected End:  02/03/25            Pt will be able to perform sit to stand from normal chair height without UE assist in 4 weeks to improve transfers needs.        Start:  12/30/24    Expected End:  02/03/25               PT Problem       Pt will be 100% IND with HEP in 8 weeks in order to maintain progress with therapy.         Start:  12/30/24    Expected End:  03/05/25            Pt will reduce pain levels to no more than 0/10 in 8 weeks in order to improve transfers,  and ambulation        Start:  12/30/24    Expected End:  03/05/25            Pt will be able to perform community ambulation demonstrating normalized taya on uneven surface in 8 weeks for home and recreational needs.        Start:  12/30/24    Expected End:  03/05/25            Pt will demonstrate subjective improvement of ADLs and recreational activities through improved score of 75 on LEFS in 8 weeks for full optimal post-op healing.        Start:  12/30/24    Expected End:  03/05/25

## 2025-01-30 ENCOUNTER — TREATMENT (OUTPATIENT)
Dept: PHYSICAL THERAPY | Facility: CLINIC | Age: 30
End: 2025-01-30
Payer: MEDICAID

## 2025-01-30 DIAGNOSIS — M25.551 RIGHT HIP PAIN: ICD-10-CM

## 2025-01-30 DIAGNOSIS — M16.51 POST-TRAUMATIC OSTEOARTHRITIS OF RIGHT HIP: ICD-10-CM

## 2025-01-30 PROCEDURE — 97110 THERAPEUTIC EXERCISES: CPT | Mod: GP | Performed by: PHYSICAL THERAPIST

## 2025-01-30 NOTE — PROGRESS NOTES
"Physical Therapy    Physical Therapy Treatment    Patient Name: Fauzia Patel  MRN: [id number]  Today's Date: 1/30/2025  Visit #5  Time Calculation  Start Time: 0914  Stop Time: 0935  Time Calculation (min): 21 min     PT Therapeutic Procedures Time Entry  Therapeutic Exercise Time Entry: 21        Payor: [facility] KAREL / Plan: [facility] PLAN / Product Type: *No Product type* /   Referred by:Bay Rios  Current Problem  Problem List Items Addressed This Visit             ICD-10-CM    Post-traumatic osteoarthritis of right hip M16.51    Right hip pain M25.551        Subjective   Pt reports she has a migraine and is not feeling well  Pain: Better  Pt has been compliant with HEP Yes      Objective  No objective measures assessed this visit    Assessment:  Pt reports during session that she feels dizzy from a migraine.  We tried seated exercises instead of standing but she still reports not feeling well.  She decided she needed to leave and would return for her next appointment    Pt will benefit from further therapy to continue to progress towards meeting functional and impairment goals.    Plan:  Continue to progress treatment to improve strength, range of motion, joint mobility, pain, and flexibility impairments which are impacting patient's function.    Treatments:  Visit # 5  Eval date:12/30/24  Re-check due on:  Auth:12 F/U VISITS  /  1-13-25 3-6-25  /  [id number], 88618, 72879, 63891, 06579 ONLY   Precautions: THR 11/18/24 post precautions (FADIR) for 3 months  Therapeutic ex:  Recumbent bike 5 min  Standing HS stretch 1 min ea  Step stretch 1 min ea  HR/TR on half roll x 2 0 ea  Step ups x 20 6\"  EXERCISES ADAPTED DUE TO MIGRAINE ON 1/30  Sitting LAQ and march 3# x 2 min  HELD  Standing hip ext, abd #3 x 20   Standing HS 3# x 20  Supine hooklying isometric hip add and abd 5 sec x2 min  SLR flex , abd x 20 ea  Clamshells x 20  Sidelying hip abd x 20  Neuro Re-ed:  Airex " march 2 min  Airex staggered stance 1 min  Manual:  Pt educated regarding manual therapy risks and benefits.  Pt given opportunity to stop if needed.  Pt aware and agrees.   Goals:  Active       PT Problem       Pt will be 50% IND with HEP in 4 weeks in order to progress with therapy.        Start:  12/30/24    Expected End:  02/03/25            Pt will be able to perform ascending reciprocal pattern of 12 steps with use of 0 rail in 4 weeks for home and community ambulation needs.         Start:  12/30/24    Expected End:  02/03/25            Pt will be able to perform household ambulation distances demonstrating normalized taya with equal weight bearing in 4 weeks for short distance ambulation needs.        Start:  12/30/24    Expected End:  02/03/25            Pt will be able to perform sit to stand from normal chair height without UE assist in 4 weeks to improve transfers needs.        Start:  12/30/24    Expected End:  02/03/25               PT Problem       Pt will be 100% IND with HEP in 8 weeks in order to maintain progress with therapy.         Start:  12/30/24    Expected End:  03/05/25            Pt will reduce pain levels to no more than 0/10 in 8 weeks in order to improve transfers,  and ambulation        Start:  12/30/24    Expected End:  03/05/25            Pt will be able to perform community ambulation demonstrating normalized taya on uneven surface in 8 weeks for home and recreational needs.        Start:  12/30/24    Expected End:  03/05/25            Pt will demonstrate subjective improvement of ADLs and recreational activities through improved score of 75 on LEFS in 8 weeks for full optimal post-op healing.        Start:  12/30/24    Expected End:  03/05/25

## 2025-02-04 ENCOUNTER — APPOINTMENT (OUTPATIENT)
Dept: PHYSICAL THERAPY | Facility: CLINIC | Age: 30
End: 2025-02-04
Payer: MEDICAID

## 2025-02-04 NOTE — PROGRESS NOTES
"Physical Therapy Treatment    Patient Name: Fauzia Patel  MRN: [id number]  Today's Date: 2/4/2025                          Payor: [facility] KAREL / Plan: [facility] PLAN / Product Type: *No Product type* /          Current Problem:  Problem List Items Addressed This Visit    None      Subjective     4/12 F/U VISITS  /  1-13-25 3-6-25  /  [id number], 32113, 66053, 63966, 01511 ONLY   HEP compliance: ***    Pain:     Pain location: R THR 11/18/24 post precautions (FADIR) for 3 months     Precautions:         Objective   No objective measures taken this visit    Treatment:    Therapeutic exercise  Recumbent bike 5 min  Standing HS stretch 1 min ea  Step stretch 1 min ea  HR/TR on half roll x 2 0 ea  Step ups x 20 6\"  EXERCISES ADAPTED DUE TO MIGRAINE ON 1/30  Sitting LAQ and march 3# x 2 min  HELD  Standing hip ext, abd #3 x 20   Standing HS 3# x 20  Supine hooklying isometric hip add and abd 5 sec x2 min  SLR flex , abd x 20 ea  Clamshells x 20  Sidelying hip abd x 20    Neuromuscular Re-education   Airex march 2 min  Airex staggered stance 1 min    Manual       Modalities      HEP      Assessment      Plan  Continue to progress POC as tolerated by patient to improve strength, mobility and overall function    Goals:  Active       PT Problem       Pt will be 50% IND with HEP in 4 weeks in order to progress with therapy.        Start:  12/30/24    Expected End:  02/03/25            Pt will be able to perform ascending reciprocal pattern of 12 steps with use of 0 rail in 4 weeks for home and community ambulation needs.         Start:  12/30/24    Expected End:  02/03/25            Pt will be able to perform household ambulation distances demonstrating normalized taya with equal weight bearing in 4 weeks for short distance ambulation needs.        Start:  12/30/24    Expected End:  02/03/25            Pt will be able to perform sit to stand from normal chair height without UE assist in 4 " weeks to improve transfers needs.        Start:  12/30/24    Expected End:  02/03/25               PT Problem       Pt will be 100% IND with HEP in 8 weeks in order to maintain progress with therapy.         Start:  12/30/24    Expected End:  03/05/25            Pt will reduce pain levels to no more than 0/10 in 8 weeks in order to improve transfers,  and ambulation        Start:  12/30/24    Expected End:  03/05/25            Pt will be able to perform community ambulation demonstrating normalized taya on uneven surface in 8 weeks for home and recreational needs.        Start:  12/30/24    Expected End:  03/05/25            Pt will demonstrate subjective improvement of ADLs and recreational activities through improved score of 75 on LEFS in 8 weeks for full optimal post-op healing.        Start:  12/30/24    Expected End:  03/05/25

## 2025-02-06 ENCOUNTER — TREATMENT (OUTPATIENT)
Dept: PHYSICAL THERAPY | Facility: CLINIC | Age: 30
End: 2025-02-06
Payer: MEDICAID

## 2025-02-06 DIAGNOSIS — M25.551 RIGHT HIP PAIN: ICD-10-CM

## 2025-02-06 DIAGNOSIS — M16.51 POST-TRAUMATIC OSTEOARTHRITIS OF RIGHT HIP: ICD-10-CM

## 2025-02-06 PROCEDURE — 97110 THERAPEUTIC EXERCISES: CPT | Mod: GP | Performed by: PHYSICAL THERAPIST

## 2025-02-06 NOTE — PROGRESS NOTES
Physical Therapy    Physical Therapy Treatment    Patient Name: Fauzia Patel  MRN: [id number]  Today's Date: 2/6/2025    Time Entry:   Time Calculation  Start Time: 1050  Stop Time: 1120  Time Calculation (min): 30 min     PT Therapeutic Procedures Time Entry  Therapeutic Exercise Time Entry: 25    Assessment:  Good tolerance to all exercises and everything in PT session. No adverse reactions. Will continue to benefit from skilled PT services to improve function.     Plan:  Continue PT plan of care     Current Problem  1. Post-traumatic osteoarthritis of right hip  Follow Up In Physical Therapy      2. Right hip pain  Follow Up In Physical Therapy        Subjective    Patient reports that her right hip is slowly getting better, even though she can still feel limitations. She is almost three months out from her R THR. Her right hip pain is a 2/10. She doesn't wake up with pain anymore and she still takes Ibuprofen PRN. Her right hip feels more stiffness rather than pain now.     Precautions: THR 11/18/24 (FADIR) for 3 months      Objective   Treatments:  Therapeutic Exercise:  Nu step 10 min Level 4  Standing HS stretch 2 x 30 sec on right   Step ups forward x 20 6 inch  Step ups lateral x 20 6 inch  Sitting LAQ and march 3# x 20 each   Standing hip ext, abd #3 x 20   Standing HS curls 3# x 20  Bridges x 20  R SLR x 20  Sidelying hip abd x 20    OP EDUCATION:  Exercise technique    Goals:  Active       PT Problem       Pt will be 50% IND with HEP in 4 weeks in order to progress with therapy.        Start:  12/30/24    Expected End:  02/03/25            Pt will be able to perform ascending reciprocal pattern of 12 steps with use of 0 rail in 4 weeks for home and community ambulation needs.         Start:  12/30/24    Expected End:  02/03/25            Pt will be able to perform household ambulation distances demonstrating normalized taya with equal weight bearing in 4 weeks for short distance ambulation needs.         Start:  12/30/24    Expected End:  02/03/25            Pt will be able to perform sit to stand from normal chair height without UE assist in 4 weeks to improve transfers needs.        Start:  12/30/24    Expected End:  02/03/25               PT Problem       Pt will be 100% IND with HEP in 8 weeks in order to maintain progress with therapy.         Start:  12/30/24    Expected End:  03/05/25            Pt will reduce pain levels to no more than 0/10 in 8 weeks in order to improve transfers,  and ambulation        Start:  12/30/24    Expected End:  03/05/25            Pt will be able to perform community ambulation demonstrating normalized taya on uneven surface in 8 weeks for home and recreational needs.        Start:  12/30/24    Expected End:  03/05/25            Pt will demonstrate subjective improvement of ADLs and recreational activities through improved score of 75 on LEFS in 8 weeks for full optimal post-op healing.        Start:  12/30/24    Expected End:  03/05/25

## 2025-02-11 ENCOUNTER — OFFICE VISIT (OUTPATIENT)
Dept: ORTHOPEDIC SURGERY | Facility: CLINIC | Age: 30
End: 2025-02-11
Payer: MEDICAID

## 2025-02-11 ENCOUNTER — APPOINTMENT (OUTPATIENT)
Dept: PHYSICAL THERAPY | Facility: CLINIC | Age: 30
End: 2025-02-11
Payer: MEDICAID

## 2025-02-11 ENCOUNTER — HOSPITAL ENCOUNTER (OUTPATIENT)
Dept: RADIOLOGY | Facility: CLINIC | Age: 30
Discharge: HOME | End: 2025-02-11
Payer: MEDICAID

## 2025-02-11 DIAGNOSIS — M54.16 LUMBAR RADICULOPATHY: ICD-10-CM

## 2025-02-11 DIAGNOSIS — Z96.641 S/P TOTAL RIGHT HIP ARTHROPLASTY: Primary | ICD-10-CM

## 2025-02-11 DIAGNOSIS — Z96.641 S/P TOTAL RIGHT HIP ARTHROPLASTY: ICD-10-CM

## 2025-02-11 PROCEDURE — 73502 X-RAY EXAM HIP UNI 2-3 VIEWS: CPT | Mod: RIGHT SIDE | Performed by: RADIOLOGY

## 2025-02-11 PROCEDURE — 73502 X-RAY EXAM HIP UNI 2-3 VIEWS: CPT | Mod: RT

## 2025-02-11 PROCEDURE — 99214 OFFICE O/P EST MOD 30 MIN: CPT | Mod: 24

## 2025-02-11 PROCEDURE — 99214 OFFICE O/P EST MOD 30 MIN: CPT

## 2025-02-11 RX ORDER — IBUPROFEN 200 MG
400 TABLET ORAL EVERY 6 HOURS PRN
Qty: 240 TABLET | Refills: 2 | Status: SHIPPED | OUTPATIENT
Start: 2025-02-11 | End: 2025-05-12

## 2025-02-11 RX ORDER — ACETAMINOPHEN 500 MG
1000 TABLET ORAL EVERY 8 HOURS PRN
Qty: 60 TABLET | Refills: 1 | Status: SHIPPED | OUTPATIENT
Start: 2025-02-11 | End: 2025-03-13

## 2025-02-11 RX ORDER — CYCLOBENZAPRINE HCL 10 MG
10 TABLET ORAL 3 TIMES DAILY PRN
Qty: 30 TABLET | Refills: 0 | Status: SHIPPED | OUTPATIENT
Start: 2025-02-11 | End: 2025-02-21

## 2025-02-11 NOTE — PROGRESS NOTES
MAYANK Westbrook, PAPankajC  Division of Adult Reconstruction  Phone: [phone]  Fax: [phone]          HPI:  Diagnosis: Posttraumatic osteoarthritis of the right hip  Procedure Performed: Right total hip arthroplasty, excision of heterotopic bone  Date of Surgery: November 18, 2024    Fauzia Patel is a pleasant 29 y.o. year-old female here for regularly scheduled follow-up of their  Right total hip arthroplasty, excision of heterotopic bone by Dr. Rios. The patient is approximately 3 months postop. The patient has no specific complaints other than occasional discomfort. The patient has no mechanical symptoms. The patient has no swelling and mild pain. The patient is using Tylenol and Advil for pain control. They have been working with outpatient PT. The patient is ambulatory without an assistive device. The patient has had no interval fall or trauma. The patient's wound has healed uneventfully. The patient denies: fevers, chills, incisional drainage, joint instability, chest or calf pain, shortness of breath, and night sweats.  No complications postoperatively.      When she was last seen in December she had mentioned that a few days after the surgery, she twisted and felt a large pop in her back. She had radicular sx after this. Patient states the right sided radicular pain she was experiencing has not improved. She states it is in the lateral side of her calf and into her right foot - 4/5th digits. She states she has tried OTC medications and heating pads without any relief.    Review of systems  There has been no interval change in this patient's past medical, surgical, medications, allergies, family history or social history since the most recent visit to a provider within our department. 14 point review of systems was performed, reviewed, and negative except for pertinent positives documented in the history of present illness.    Past Medical History:   Diagnosis Date    Anxiety      Depression     PTSD (post-traumatic stress disorder)      Patient Active Problem List   Diagnosis    Abnormal vaginal bleeding    Fever    Genital ulcer, female    Hematuria    Irregular bleeding    Abdominal pain    Stomach pain    Tubo-ovarian abscess    Uses intrauterine device for birth control    Vaginal pain    Vulvar lesion    Primary osteoarthritis of right hip    Obesity    S/P total right hip arthroplasty    Post-traumatic osteoarthritis of right hip    Right hip pain     Medication Documentation Review Audit       Reviewed by Shanna Arthur MA (Medical Assistant) on 24 at 1313      Medication Order Taking? Sig Documenting Provider Last Dose Status   cyclobenzaprine (Flexeril) 10 mg tablet [id number]  Take 1 tablet (10 mg) by mouth 2 times a day as needed for muscle spasms for up to 10 days. Kaz Goncalves PA-C   24 2359   ondansetron (Zofran) 4 mg tablet [id number]  Take 1 tablet (4 mg) by mouth every 8 hours if needed for nausea or vomiting. Bay Rios MD  Active   pantoprazole (ProtoNix) 40 mg EC tablet [id number]  Take 1 tablet (40 mg) by mouth once daily in the morning before meals. Do not crush, chew, or split. Bay Rios MD   24 2359                  No Known Allergies  Social History     Socioeconomic History    Marital status: Single     Spouse name: Not on file    Number of children: Not on file    Years of education: Not on file    Highest education level: Not on file   Occupational History    Not on file   Tobacco Use    Smoking status: Never     Passive exposure: Never    Smokeless tobacco: Never   Vaping Use    Vaping status: Every Day    Substances: Nicotine    Devices: Disposable   Substance and Sexual Activity    Alcohol use: Not Currently    Drug use: Yes     Types: Marijuana     Comment: last used a week ago    Sexual activity: Defer   Other Topics Concern    Not on file   Social History Narrative    Not on file     Social Drivers of  Health     Financial Resource Strain: Low Risk  (11/18/2024)    Overall Financial Resource Strain (CARDIA)     Difficulty of Paying Living Expenses: Not hard at all   Food Insecurity: No Food Insecurity (11/18/2024)    Hunger Vital Sign     Worried About Running Out of Food in the Last Year: Never true     Ran Out of Food in the Last Year: Never true   Transportation Needs: No Transportation Needs (12/9/2024)    OASIS : Transportation     Lack of Transportation (Medical): No     Lack of Transportation (Non-Medical): No     Patient Unable or Declines to Respond: No   Physical Activity: Not on File (5/23/2021)    Received from [employer]    Physical Activity     Physical Activity: 0   Stress: Not on File (2/10/2022)    Received from [employer] [facility]    Stress     Stress: 0   Recent Concern: Stress - At Risk (2/10/2022)    Received from [employer] [facility]    Stress     Stress: 2   Social Connections: Feeling Somewhat Isolated (12/9/2024)    OASIS : Social Isolation     Frequency of experiencing loneliness or isolation: Sometimes   Intimate Partner Violence: Patient Declined (11/18/2024)    Humiliation, Afraid, Rape, and Kick questionnaire     Fear of Current or Ex-Partner: Patient declined     Emotionally Abused: Patient declined     Physically Abused: Patient declined     Sexually Abused: Patient declined   Housing Stability: Low Risk  (11/18/2024)    Housing Stability Vital Sign     Unable to Pay for Housing in the Last Year: No     Number of Times Moved in the Last Year: 0     Homeless in the Last Year: No     Past Surgical History:   Procedure Laterality Date    CT GUIDED PERCUTANEOUS PERITONEAL OR RETROPERITONEAL FLUID COLLECTION DRAINAGE  02/13/2023    CT GUIDED PERCUTANEOUS PERITONEAL OR RETROPERITONEAL FLUID COLLECTION DRAINAGE [facility]    HYSTEROSCOPY         Physical Exam:  General: Well-appearing female is alert and oriented x 3 and appears comfortable. NAD. Pleasant and cooperative.  Mood:  Euthymic  Respirations: Non-labored  Gait: Slight Antalgic   Assistive Device: no device. Coordination and balance intact.    right Hip  No other overlying lesion.  Wound: Incision is well healed with no signs of surrounding infection, erythema, fluctuance, dehiscence, drainage, or suture abscess. There is mild warmth and effusion about the hip, both consistent with the normal post-operative healing.   Range of motion: Surgical hip demonstrates painless free ROM through short arcs of motion.  Tenderness: None  Knee: Painless ROM of the knee.   Calf: No swelling, warmth, or tenderness. Lower extremity is well perfused.  Able to dorsi-flex and plantar-flex the ankle with appropriate strength. Able to wiggle all toes.   Neurovascular exam is at baseline.  The foot is warm and well perfused with palpable DP pulse.  Sensation is intact to light touch.     Back exam:  Point tenderness of the lower lumbar region midline. Paraspinal tenderness of the right side of her lower lumbar region. Positive straight leg raise    Imaging:  Radiographs of the operative hip were independently obtained and reviewed in clinic. The implant is well fixed and well aligned. No radiographic evidence of germaine-implant fracture, lucency or dislocation. Compared to immediate post-operative x-rays, no change in appearance. Leg lengths closely restored.    Assesment/Plan:  Fauzia Patel is doing well and progressing well approximately 3 months s/p right total hip arthroplasty. I am satisfied with the patient's recovery to this point.     With regard to her continued radicular sx I am concerned she may have herniated a disc. I believe that it is medically necessary to obtain an MRI lumbar spine without contrast to look for the etiology of her lower back pain and radicular sx - specifically looking for disc herniation.    Prescribed a prescription of Flexeril 10 mg to take as needed for the muscle spasms and stiffness. Advised the patient to take at  night before bed. Pt understand she may not drive while taking the medication.    A thorough discussion was had with the patient concerning the postoperative course and the patient is in agreement with the plan.  Today, hip precautions were lifted. Patient can progress activities as tolerated. You may increase your activities and get back to a normal, low-impact lifestyle. In office demonstration was provided on how to pick items off the floor while in a seated or standing position to avoid posterior dislocation. Patient should also limit running, jumping, weight lifting and repetitive activity. The patient verbalizes understanding of these precautions.  Continued home exercise program or outpatient PT, per protocol to improve strength and stamina.   Continue with current pain regimen of Tylenol and Advil PRN. I sent refills of both to her local pharmacy. Pt should take with food.  Reviewed the need for prophylactic antibiotics prior to any dental or other invasive procedures. Patient understands that their dentist or myself may prescribe. This will continue until the pt is 2 years post op.    All questions were answered.    Follow-up in 9 months for 1 year post op visit with radiographs or sooner if there are any concerns.     Pt will call office when she obtains the MRI to meet with Dr. Rios to discuss the results.    MAYANK Murry, PAPankajC  Orthopedic Physician Assistant  Division of Adult Reconstruction  Department of Orthopaedics  [place] [address]

## 2025-02-11 NOTE — PROGRESS NOTES
Physical Therapy Treatment    Patient Name: Fauzia Patel  MRN: [id number]  Today's Date: 2/11/2025                          Payor: [facility] KAREL / Plan: [facility] PLAN / Product Type: *No Product type* /          Current Problem:  Problem List Items Addressed This Visit    None      Subjective     HEP compliance: ***    Pain:     Pain location: ***    Precautions:         Objective   No objective measures taken this visit    Treatment:    Therapeutic exercise  Nu step 10 min Level 4  Standing HS stretch 2 x 30 sec on right   Step ups forward x 20 6 inch  Step ups lateral x 20 6 inch  Sitting LAQ and march 3# x 20 each   Standing hip ext, abd #3 x 20   Standing HS curls 3# x 20  Bridges x 20  R SLR x 20  Sidelying hip abd x 20    Neuromuscular Re-education       Manual       Modalities      HEP      Assessment      Plan  Continue to progress POC as tolerated by patient to improve strength, mobility and overall function    Goals:  Active       PT Problem       Pt will be 50% IND with HEP in 4 weeks in order to progress with therapy.        Start:  12/30/24    Expected End:  02/03/25            Pt will be able to perform ascending reciprocal pattern of 12 steps with use of 0 rail in 4 weeks for home and community ambulation needs.         Start:  12/30/24    Expected End:  02/03/25            Pt will be able to perform household ambulation distances demonstrating normalized taay with equal weight bearing in 4 weeks for short distance ambulation needs.        Start:  12/30/24    Expected End:  02/03/25            Pt will be able to perform sit to stand from normal chair height without UE assist in 4 weeks to improve transfers needs.        Start:  12/30/24    Expected End:  02/03/25               PT Problem       Pt will be 100% IND with HEP in 8 weeks in order to maintain progress with therapy.         Start:  12/30/24    Expected End:  03/05/25            Pt will reduce pain  levels to no more than 0/10 in 8 weeks in order to improve transfers,  and ambulation        Start:  12/30/24    Expected End:  03/05/25            Pt will be able to perform community ambulation demonstrating normalized taya on uneven surface in 8 weeks for home and recreational needs.        Start:  12/30/24    Expected End:  03/05/25            Pt will demonstrate subjective improvement of ADLs and recreational activities through improved score of 75 on LEFS in 8 weeks for full optimal post-op healing.        Start:  12/30/24    Expected End:  03/05/25

## 2025-02-13 ENCOUNTER — TREATMENT (OUTPATIENT)
Dept: PHYSICAL THERAPY | Facility: CLINIC | Age: 30
End: 2025-02-13
Payer: MEDICAID

## 2025-02-13 DIAGNOSIS — M16.51 POST-TRAUMATIC OSTEOARTHRITIS OF RIGHT HIP: ICD-10-CM

## 2025-02-13 DIAGNOSIS — M25.551 RIGHT HIP PAIN: ICD-10-CM

## 2025-02-13 PROCEDURE — 97110 THERAPEUTIC EXERCISES: CPT | Mod: GP | Performed by: PHYSICAL THERAPIST

## 2025-02-13 PROCEDURE — 97112 NEUROMUSCULAR REEDUCATION: CPT | Mod: GP | Performed by: PHYSICAL THERAPIST

## 2025-02-13 PROCEDURE — 97140 MANUAL THERAPY 1/> REGIONS: CPT | Mod: GP | Performed by: PHYSICAL THERAPIST

## 2025-02-13 NOTE — PROGRESS NOTES
Physical Therapy    Physical Therapy Treatment    Patient Name: Fauzia Patel  MRN: [id number]  Today's Date: 2/13/2025  Visit #7  Time Calculation  Start Time: 0917  Stop Time: 1000  Time Calculation (min): 43 min     PT Therapeutic Procedures Time Entry  Manual Therapy Time Entry: 15  Neuromuscular Re-Education Time Entry: 10  Therapeutic Exercise Time Entry: 18        Payor: [facility] KAREL / Plan: [facility] PLAN / Product Type: *No Product type* /   Referred by:Bay Rios  Current Problem  Problem List Items Addressed This Visit             ICD-10-CM    Post-traumatic osteoarthritis of right hip M16.51    Right hip pain M25.551        Subjective   Pt reports she went to the Dr and mentioned she has been having back pain.  Reports pain down the R LE to her calf/ankle and numbness in 4th and 5th toes  Pain: Better  Pt has been compliant with HEP Yes      Objective  No objective measures assessed this visit    Assessment:  Pt is progressing as expected towards goals. Pt worked on gait mechanics today.  Also added prone press-ups and/or lumbar ext for centralization of symptoms  This session exercises were progressed (p) or added (NEW) as documented in the treatment section.  The pt required min to mod cues and demonstration to complete exercises with proper form.    Pt responded to all activities without adverse effects.    Pt continues to lack strength, ROM and gait mechanics necessary for the completion of baseline ADLs without pain.  Pt will benefit from further therapy to continue to progress towards meeting functional and impairment goals.    Plan:  Continue to progress treatment to improve strength, range of motion, joint mobility, pain, and flexibility impairments which are impacting patient's function.    Treatments:  Visit # 7  Eval date:12/30/24  Re-check due on:  Auth:12 F/U VISITS  /  1-13-25 3-6-25  /  [id number], [id number], 16287, 79080, 80834 ONLY   Precautions: THR  "11/18/24 POST OP PRECAUTIONS LIFTED  Therapeutic ex:  Recumbent bike 5 min  Standing HS stretch 1 min ea,   Step stretch 1 min ea  Standing hip ext, abd RTB x 20   Prone press ups 3 x 10  HELD  HR/TR on half roll x 2 0 ea  Step ups x 20 6\"  Standing HS 3# x 20  Supine hooklying isometric hip add and abd 5 sec x2 min  SLR flex , abd x 20 ea  Clamshells x 20  Sidelying hip abd x 20  Neuro Re-ed:  Airex march 2 min HELD  Airex staggered stance 1 min HELD  TM walking with cues for heel to toe, consistent step length and no weight shift over the non op side  Manual:  Pt educated regarding manual therapy risks and benefits.  Pt given opportunity to stop if needed.  Pt aware and agrees.   PA thoracic, lumbar and SI, L LE distraction only  Goals:  Active       PT Problem       Pt will be 50% IND with HEP in 4 weeks in order to progress with therapy.        Start:  12/30/24    Expected End:  02/03/25            Pt will be able to perform ascending reciprocal pattern of 12 steps with use of 0 rail in 4 weeks for home and community ambulation needs.         Start:  12/30/24    Expected End:  02/03/25            Pt will be able to perform household ambulation distances demonstrating normalized taya with equal weight bearing in 4 weeks for short distance ambulation needs.        Start:  12/30/24    Expected End:  02/03/25            Pt will be able to perform sit to stand from normal chair height without UE assist in 4 weeks to improve transfers needs.        Start:  12/30/24    Expected End:  02/03/25               PT Problem       Pt will be 100% IND with HEP in 8 weeks in order to maintain progress with therapy.         Start:  12/30/24    Expected End:  03/05/25            Pt will reduce pain levels to no more than 0/10 in 8 weeks in order to improve transfers,  and ambulation        Start:  12/30/24    Expected End:  03/05/25            Pt will be able to perform community ambulation demonstrating normalized " taya on uneven surface in 8 weeks for home and recreational needs.        Start:  12/30/24    Expected End:  03/05/25            Pt will demonstrate subjective improvement of ADLs and recreational activities through improved score of 75 on LEFS in 8 weeks for full optimal post-op healing.        Start:  12/30/24    Expected End:  03/05/25

## 2025-02-18 ENCOUNTER — APPOINTMENT (OUTPATIENT)
Dept: ORTHOPEDIC SURGERY | Facility: CLINIC | Age: 30
End: 2025-02-18
Payer: MEDICAID

## 2025-02-19 ENCOUNTER — TREATMENT (OUTPATIENT)
Dept: PHYSICAL THERAPY | Facility: CLINIC | Age: 30
End: 2025-02-19
Payer: MEDICAID

## 2025-02-19 DIAGNOSIS — M16.51 POST-TRAUMATIC OSTEOARTHRITIS OF RIGHT HIP: ICD-10-CM

## 2025-02-19 DIAGNOSIS — M25.551 RIGHT HIP PAIN: ICD-10-CM

## 2025-02-19 PROCEDURE — 97140 MANUAL THERAPY 1/> REGIONS: CPT | Mod: GP,CQ | Performed by: PHYSICAL THERAPIST

## 2025-02-19 PROCEDURE — 97110 THERAPEUTIC EXERCISES: CPT | Mod: GP,CQ | Performed by: PHYSICAL THERAPIST

## 2025-02-19 ASSESSMENT — PAIN - FUNCTIONAL ASSESSMENT: PAIN_FUNCTIONAL_ASSESSMENT: 0-10

## 2025-02-19 ASSESSMENT — PAIN SCALES - GENERAL: PAINLEVEL_OUTOF10: 2

## 2025-02-19 NOTE — PROGRESS NOTES
"Physical Therapy Treatment    Patient Name: Fauzia Patel  MRN: [id number]  Today's Date: 2/19/2025    Time Entry:      PT Evaluation Time Entry  PT Evaluation (Low) Time Entry: 1730  PT Evaluation (Moderate) Time Entry: 1815  PT Therapeutic Procedures Time Entry  Manual Therapy Time Entry: 30  Therapeutic Exercise Time Entry: 15    Assessment:  Pt displayed increased HS, quad, IT band, QL and piriformis tightness. She tolerated w/o increased c/o pain, pt gradually displayed increase ROM as the stretches progressed. Pt displayed improved gait post session w/ hip flexion hip flexion and heel strike.     Plan:  Continue w/ the POC to improve L LE ROM to decreased difficulty w/ gait.    Current Problem  Problem List Items Addressed This Visit             ICD-10-CM    Post-traumatic osteoarthritis of right hip M16.51    Right hip pain M25.551       Subjective   \"My leg feels more stable after the activities today.\"  General     Visit: 9  Precautions     Pain  Pain Assessment  Pain Assessment: 0-10  0-10 (Numeric) Pain Score: 2  Pain Location: Hip  Pain Orientation: Right    Objective     Treatments:  Manual:  Static HS stretching,   Prone quad stretching,   Side lying it band stretching,   Prone QL release,   Prone piriformis release    Therex:  Side lying hip abduction,   Bridging,   Leg press, standing therex 1 set x 10 reps of   L hip extension,   Hip flexion,   Hip abduction      OP EDUCATION:       Goals:  Active       PT Problem       Pt will be 50% IND with HEP in 4 weeks in order to progress with therapy.        Start:  12/30/24    Expected End:  02/03/25            Pt will be able to perform ascending reciprocal pattern of 12 steps with use of 0 rail in 4 weeks for home and community ambulation needs.         Start:  12/30/24    Expected End:  02/03/25            Pt will be able to perform household ambulation distances demonstrating normalized taya with equal weight bearing in 4 weeks for short distance " ambulation needs.        Start:  12/30/24    Expected End:  02/03/25            Pt will be able to perform sit to stand from normal chair height without UE assist in 4 weeks to improve transfers needs.        Start:  12/30/24    Expected End:  02/03/25               PT Problem       Pt will be 100% IND with HEP in 8 weeks in order to maintain progress with therapy.         Start:  12/30/24    Expected End:  03/05/25            Pt will reduce pain levels to no more than 0/10 in 8 weeks in order to improve transfers,  and ambulation        Start:  12/30/24    Expected End:  03/05/25            Pt will be able to perform community ambulation demonstrating normalized taya on uneven surface in 8 weeks for home and recreational needs.        Start:  12/30/24    Expected End:  03/05/25            Pt will demonstrate subjective improvement of ADLs and recreational activities through improved score of 75 on LEFS in 8 weeks for full optimal post-op healing.        Start:  12/30/24    Expected End:  03/05/25

## 2025-02-20 ENCOUNTER — HOSPITAL ENCOUNTER (OUTPATIENT)
Dept: CARDIOLOGY | Facility: HOSPITAL | Age: 30
Discharge: HOME | End: 2025-02-20
Payer: MEDICAID

## 2025-02-20 DIAGNOSIS — F19.90 DRUG USE: ICD-10-CM

## 2025-02-20 LAB
ATRIAL RATE: 85 BPM
P AXIS: 44 DEGREES
P OFFSET: 212 MS
P ONSET: 165 MS
PR INTERVAL: 110 MS
Q ONSET: 220 MS
QRS COUNT: 14 BEATS
QRS DURATION: 76 MS
QT INTERVAL: 370 MS
QTC CALCULATION(BAZETT): 440 MS
QTC FREDERICIA: 415 MS
R AXIS: 29 DEGREES
T AXIS: 26 DEGREES
T OFFSET: 405 MS
VENTRICULAR RATE: 85 BPM

## 2025-02-20 PROCEDURE — 93005 ELECTROCARDIOGRAM TRACING: CPT

## 2025-02-24 ENCOUNTER — HOSPITAL ENCOUNTER (OUTPATIENT)
Dept: RADIOLOGY | Facility: CLINIC | Age: 30
Discharge: HOME | End: 2025-02-24
Payer: MEDICAID

## 2025-02-24 DIAGNOSIS — M54.16 LUMBAR RADICULOPATHY: ICD-10-CM

## 2025-02-24 PROCEDURE — 72148 MRI LUMBAR SPINE W/O DYE: CPT

## 2025-02-24 PROCEDURE — 72148 MRI LUMBAR SPINE W/O DYE: CPT | Performed by: RADIOLOGY

## 2025-02-25 ENCOUNTER — TREATMENT (OUTPATIENT)
Dept: PHYSICAL THERAPY | Facility: CLINIC | Age: 30
End: 2025-02-25
Payer: MEDICAID

## 2025-02-25 DIAGNOSIS — M25.551 RIGHT HIP PAIN: Primary | ICD-10-CM

## 2025-02-25 DIAGNOSIS — M16.51 POST-TRAUMATIC OSTEOARTHRITIS OF RIGHT HIP: ICD-10-CM

## 2025-02-25 PROCEDURE — 97110 THERAPEUTIC EXERCISES: CPT | Mod: GP | Performed by: PHYSICAL THERAPIST

## 2025-02-25 ASSESSMENT — PAIN - FUNCTIONAL ASSESSMENT: PAIN_FUNCTIONAL_ASSESSMENT: 0-10

## 2025-02-25 ASSESSMENT — PAIN SCALES - GENERAL: PAINLEVEL_OUTOF10: 0 - NO PAIN

## 2025-02-25 NOTE — PROGRESS NOTES
"Physical Therapy    Physical Therapy Treatment    Patient Name: Fauzia Patel  MRN: [id number]  Today's Date: 2/25/2025  Time Calculation  Start Time: 1320  Stop Time: 1400  Time Calculation (min): 40 min     PT Therapeutic Procedures Time Entry  Therapeutic Exercise Time Entry: 40                 Payor: [facility] KAREL / Plan: [facility] PLAN / Product Type: *No Product type* /     Reason for Referral: R MESSI  Referred By: Blanca  General Comment: Visit 9 of 12 Auth exp 3-6-25    Current Problem    Problem List Items Addressed This Visit             ICD-10-CM    Post-traumatic osteoarthritis of right hip M16.51    Right hip pain - Primary M25.551        Subjective   Pt overall notes minimal pain today in her R hip. Still feels some tightness and weakness.   Compliance with HEP-yes    Precautions  Precautions  Precautions Comment: no precautions at this time    Pain  Pain Assessment: 0-10  0-10 (Numeric) Pain Score: 0 - No pain  Pain Location: Hip  Pain Orientation: Right    Objective   No measures     Treatments:  Eval date:12/30/24  Therapeutic ex:  Nu step bike 5 min  Standing HS stretch 1 min ea,   Step lunge stretch 1 min ea  Step ups x 20 6\"  Lateral step up 6\" 2 x 10 R LE  Reverse step up 6\" R LE on step 2 x 10   Standing hip ext, flex abd RTB x 20   Inch worms RTB 2 x 10   Monster walk RTB 2 x 10   Wall slides 2 x 10   Supine hooklying isometric hip add 2 x 10 3\" hold   Bridges 2 x 10   Reviewed prone quad stretch 3 x 30 sec    Assessment:   Pt overall progressed with program today to add additional WB activity and to progress away from table exercises to more functional WB activity. Pt overall tolerated treatment well with only complaints of tightness noted.     Plan:   Cont 2 more visits with re-check.     Goals:  Active       PT Problem       Pt will be 50% IND with HEP in 4 weeks in order to progress with therapy.        Start:  12/30/24    Expected End:  02/03/25    "         Pt will be able to perform ascending reciprocal pattern of 12 steps with use of 0 rail in 4 weeks for home and community ambulation needs.         Start:  12/30/24    Expected End:  02/03/25            Pt will be able to perform household ambulation distances demonstrating normalized taya with equal weight bearing in 4 weeks for short distance ambulation needs.        Start:  12/30/24    Expected End:  02/03/25            Pt will be able to perform sit to stand from normal chair height without UE assist in 4 weeks to improve transfers needs.        Start:  12/30/24    Expected End:  02/03/25               PT Problem       Pt will be 100% IND with HEP in 8 weeks in order to maintain progress with therapy.         Start:  12/30/24    Expected End:  03/05/25            Pt will reduce pain levels to no more than 0/10 in 8 weeks in order to improve transfers,  and ambulation        Start:  12/30/24    Expected End:  03/05/25            Pt will be able to perform community ambulation demonstrating normalized taya on uneven surface in 8 weeks for home and recreational needs.        Start:  12/30/24    Expected End:  03/05/25            Pt will demonstrate subjective improvement of ADLs and recreational activities through improved score of 75 on LEFS in 8 weeks for full optimal post-op healing.        Start:  12/30/24    Expected End:  03/05/25

## 2025-02-26 ENCOUNTER — TELEPHONE (OUTPATIENT)
Dept: ORTHOPEDIC SURGERY | Facility: CLINIC | Age: 30
End: 2025-02-26
Payer: MEDICAID

## 2025-02-26 NOTE — TELEPHONE ENCOUNTER
Tried to call patient to discuss MRI results. Left message for her to call the office back as call went straight to voicemail.

## 2025-02-27 ENCOUNTER — TREATMENT (OUTPATIENT)
Dept: PHYSICAL THERAPY | Facility: CLINIC | Age: 30
End: 2025-02-27
Payer: MEDICAID

## 2025-02-27 DIAGNOSIS — M25.551 RIGHT HIP PAIN: ICD-10-CM

## 2025-02-27 DIAGNOSIS — M16.51 POST-TRAUMATIC OSTEOARTHRITIS OF RIGHT HIP: ICD-10-CM

## 2025-02-27 PROCEDURE — 97110 THERAPEUTIC EXERCISES: CPT | Mod: GP | Performed by: PHYSICAL THERAPIST

## 2025-02-27 NOTE — PROGRESS NOTES
"Physical Therapy    Physical Therapy Treatment    Patient Name: Fauzia Patel  MRN: [id number]  Today's Date: 2/27/2025    Time Entry:   Time Calculation  Start Time: 1048  Stop Time: 1126  Time Calculation (min): 38 min     PT Therapeutic Procedures Time Entry  Therapeutic Exercise Time Entry: 38      Assessment:  Good tolerance to all exercises and everything in PT session. No adverse reactions. Patient will continue to benefit from skilled PT services for improvement.     Plan:  Continue PT as planned     Current Problem  1. Post-traumatic osteoarthritis of right hip  Follow Up In Physical Therapy      2. Right hip pain  Follow Up In Physical Therapy        Subjective    Patient reports that her R hip is getting better and is currently doing \"good.\" She doesn't follow up with her surgeon for several more months. Ready for another PT treatment session today. Her ability to ambulate is improving. Return to work next week.     Precautions: R hip THR 11/18/24    Objective   Treatments:  Therapeutic Exercise:  -Nu step bike 5 min  -Standing HS stretch 2 x 1 min   -Step lunge stretch 2 x 1 min   -Incline calf stretch 2 x 1 min    -Step ups x 20 6\"  -Lateral step up 6\" 2 x 10 R LE  -Reverse step up 6\" R LE on step 2 x 10   -Standing hip ext, flex abd x 20 yellow  -Resisted side step 4 laps yellow   -Monster walk 4 laps yellow   -Wall slides 2 x 10   -Mini squats with yellow band 2 x 10   -Hook lying isometric hip add 2 x 10 ball  -Bridges 2 x 15  -Bridges with hip adduction with ball x 10   -Clamshell 2 x 10 R in L side lying yellow  -Hook lying hip abduction yellow 2 x 10  -Prone quad stretch 2 x 30 sec     OP EDUCATION:  Exercise technique, progressions     Goals:  Active       PT Problem       Pt will be 50% IND with HEP in 4 weeks in order to progress with therapy.        Start:  12/30/24    Expected End:  02/03/25            Pt will be able to perform ascending reciprocal pattern of 12 steps with use of 0 rail in " 4 weeks for home and community ambulation needs.         Start:  12/30/24    Expected End:  02/03/25            Pt will be able to perform household ambulation distances demonstrating normalized taya with equal weight bearing in 4 weeks for short distance ambulation needs.        Start:  12/30/24    Expected End:  02/03/25            Pt will be able to perform sit to stand from normal chair height without UE assist in 4 weeks to improve transfers needs.        Start:  12/30/24    Expected End:  02/03/25               PT Problem       Pt will be 100% IND with HEP in 8 weeks in order to maintain progress with therapy.         Start:  12/30/24    Expected End:  03/05/25            Pt will reduce pain levels to no more than 0/10 in 8 weeks in order to improve transfers,  and ambulation        Start:  12/30/24    Expected End:  03/05/25            Pt will be able to perform community ambulation demonstrating normalized taya on uneven surface in 8 weeks for home and recreational needs.        Start:  12/30/24    Expected End:  03/05/25            Pt will demonstrate subjective improvement of ADLs and recreational activities through improved score of 75 on LEFS in 8 weeks for full optimal post-op healing.        Start:  12/30/24    Expected End:  03/05/25

## 2025-03-04 ENCOUNTER — APPOINTMENT (OUTPATIENT)
Dept: PHYSICAL THERAPY | Facility: CLINIC | Age: 30
End: 2025-03-04
Payer: MEDICAID

## 2025-03-05 NOTE — PROGRESS NOTES
"Physical Therapy Treatment    Patient Name: Fauzia Patel  MRN: [id number]  Today's Date: 3/6/2025                          Payor: [facility] KAREL / Plan: [facility] PLAN / Product Type: *No Product type* /     Reason for Referral: R MESSI  Referred By: Blanca  General Comment: Visit 11 of 12 Auth exp 3-6-25    Current Problem:  Problem List Items Addressed This Visit             ICD-10-CM    Post-traumatic osteoarthritis of right hip M16.51    Right hip pain M25.551       Subjective   AUTH EXPIRES 3/6  Pt reports mild pain. Notes she has been doing \"pretty good.\"  RTW tomorrow   HEP compliance: yes     Pain:  Pain Assessment: 0-10  0-10 (Numeric) Pain Score: 2  Pain location: R hip    Precautions:  Precautions  Precautions Comment: no precautions at this time      Objective   Lower Extremity STRENGTH: (WNL unless documented below) (p=pain)   MMT 5/5 max  RIGHT LEFT   Hip Flexion 4+ 5   Hip Extension 5 5   Hip Abduction 5 5   Hip Adduction 5 5   Hip ER 5 5   Hip IR  5 5     Other Measures  Lower Extremity Funtional Score (LEFS): 43/80    Treatment:    Therapeutic exercise  Nu step bike 5 min  Standing HS stretch 2 x 1 min   Standing hip ext, flex abd x 20 yellow  Resisted side step 4 laps yellow   Monster walk, inch worms 4 laps yellow   Wall slides 2 x 10   Bridges with hip abd BTB 2 x 10  Clamshells R RTB 2x10  -Hook lying hip abduction yellow 2 x 10  -Prone quad stretch 2 x 30 sec     HEP  Access Code: [email or web address]  URL: [email or web address]  Date: 03/06/2025  Prepared by: Rox Herrmann    Exercises  - Hip Abduction with Resistance Loop  - 1 x daily - 3-4 x weekly - 2 sets - 10 reps  - Hip Extension with Resistance Loop  - 1 x daily - 3-4 x weekly - 2 sets - 10 reps  - Standing Hip Flexion with Resistance Loop  - 1 x daily - 3-4 x weekly - 2 sets - 10 reps  - Forward Monster Walks  - 1 x daily - 3-4 x weekly - 2 sets - 10 reps  - Backward Monster Walks  - 1 " x daily - 3-4 x weekly - 2 sets - 10 reps  - Squat with Chair Touch  - 1 x daily - 3-4 x weekly - 2 sets - 10 reps  - Bridge with Hip Abduction and Resistance - Ground Touches  - 1 x daily - 3-4 x weekly - 2-3 sets - 10 reps  - Clamshell with Resistance  - 1 x daily - 3-4 x weekly - 2-3 sets - 10 reps    Assessment  Pt demonstrates improved strength and mobility. At this point she has met her functional goals and is appropriate for discharge to HEP    Plan  Discharge to [facility]     Goals:  Active       PT Problem       Pt will be 50% IND with HEP in 4 weeks in order to progress with therapy.        Start:  12/30/24    Expected End:  02/03/25            Pt will be able to perform ascending reciprocal pattern of 12 steps with use of 0 rail in 4 weeks for home and community ambulation needs.         Start:  12/30/24    Expected End:  02/03/25            Pt will be able to perform household ambulation distances demonstrating normalized taya with equal weight bearing in 4 weeks for short distance ambulation needs.        Start:  12/30/24    Expected End:  02/03/25            Pt will be able to perform sit to stand from normal chair height without UE assist in 4 weeks to improve transfers needs.        Start:  12/30/24    Expected End:  02/03/25               PT Problem       Pt will be 100% IND with HEP in 8 weeks in order to maintain progress with therapy.         Start:  12/30/24    Expected End:  03/05/25            Pt will reduce pain levels to no more than 0/10 in 8 weeks in order to improve transfers,  and ambulation        Start:  12/30/24    Expected End:  03/05/25            Pt will be able to perform community ambulation demonstrating normalized taya on uneven surface in 8 weeks for home and recreational needs.        Start:  12/30/24    Expected End:  03/05/25            Pt will demonstrate subjective improvement of ADLs and recreational activities through improved score of 75 on LEFS in 8 weeks  for full optimal post-op healing.        Start:  12/30/24    Expected End:  03/05/25

## 2025-03-06 ENCOUNTER — TREATMENT (OUTPATIENT)
Dept: PHYSICAL THERAPY | Facility: CLINIC | Age: 30
End: 2025-03-06
Payer: MEDICAID

## 2025-03-06 DIAGNOSIS — M16.51 POST-TRAUMATIC OSTEOARTHRITIS OF RIGHT HIP: ICD-10-CM

## 2025-03-06 DIAGNOSIS — M25.551 RIGHT HIP PAIN: ICD-10-CM

## 2025-03-06 PROCEDURE — 97110 THERAPEUTIC EXERCISES: CPT | Mod: GP | Performed by: PHYSICAL THERAPIST

## 2025-03-06 ASSESSMENT — PAIN SCALES - GENERAL: PAINLEVEL_OUTOF10: 2

## 2025-03-06 ASSESSMENT — PAIN - FUNCTIONAL ASSESSMENT: PAIN_FUNCTIONAL_ASSESSMENT: 0-10

## 2025-08-12 ENCOUNTER — APPOINTMENT (OUTPATIENT)
Dept: OBSTETRICS AND GYNECOLOGY | Facility: CLINIC | Age: 30
End: 2025-08-12
Payer: MEDICAID

## 2025-08-12 VITALS
DIASTOLIC BLOOD PRESSURE: 70 MMHG | BODY MASS INDEX: 32.74 KG/M2 | HEIGHT: 68 IN | WEIGHT: 216 LBS | SYSTOLIC BLOOD PRESSURE: 116 MMHG

## 2025-08-12 DIAGNOSIS — N89.8 VAGINAL DISCHARGE: Primary | ICD-10-CM

## 2025-08-12 DIAGNOSIS — B96.89 BACTERIAL VAGINOSIS: ICD-10-CM

## 2025-08-12 DIAGNOSIS — Z97.5 IUD (INTRAUTERINE DEVICE) IN PLACE: ICD-10-CM

## 2025-08-12 DIAGNOSIS — R35.0 URINARY FREQUENCY: ICD-10-CM

## 2025-08-12 DIAGNOSIS — N76.0 BACTERIAL VAGINOSIS: ICD-10-CM

## 2025-08-12 DIAGNOSIS — A74.9 POSITIVE CHLAMYDIA PCR: ICD-10-CM

## 2025-08-12 DIAGNOSIS — R10.2 PELVIC PAIN: ICD-10-CM

## 2025-08-12 LAB
POC BLOOD, URINE: NEGATIVE
POC GLUCOSE, URINE: NEGATIVE MG/DL
POC LEUKOCYTES, URINE: NEGATIVE
POC NITRITE,URINE: NEGATIVE
POC PROTEIN, URINE: NEGATIVE MG/DL

## 2025-08-12 PROCEDURE — 81002 URINALYSIS NONAUTO W/O SCOPE: CPT | Performed by: OBSTETRICS & GYNECOLOGY

## 2025-08-12 PROCEDURE — 3008F BODY MASS INDEX DOCD: CPT | Performed by: OBSTETRICS & GYNECOLOGY

## 2025-08-12 PROCEDURE — 99214 OFFICE O/P EST MOD 30 MIN: CPT | Performed by: OBSTETRICS & GYNECOLOGY

## 2025-08-12 RX ORDER — FLUOXETINE 20 MG/1
20 CAPSULE ORAL DAILY
COMMUNITY

## 2025-08-13 LAB
BV SCORE VAG QL: ABNORMAL
C TRACH RRNA SPEC QL NAA+PROBE: DETECTED
N GONORRHOEA RRNA SPEC QL NAA+PROBE: NOT DETECTED
QUEST GC CT AMPLIFIED (ALWAYS MESSAGE): ABNORMAL

## 2025-08-13 RX ORDER — METRONIDAZOLE 500 MG/1
500 TABLET ORAL 2 TIMES DAILY
Qty: 14 TABLET | Refills: 0 | Status: SHIPPED | OUTPATIENT
Start: 2025-08-13 | End: 2025-08-20

## 2025-08-13 RX ORDER — AZITHROMYCIN 1 G/1
1 POWDER, FOR SUSPENSION ORAL ONCE
Qty: 1 PACKET | Refills: 0 | Status: SHIPPED | OUTPATIENT
Start: 2025-08-13 | End: 2025-08-13

## 2025-08-18 ENCOUNTER — PATIENT OUTREACH (OUTPATIENT)
Dept: OBSTETRICS AND GYNECOLOGY | Facility: HOSPITAL | Age: 30
End: 2025-08-18
Payer: MEDICAID

## 2025-08-18 DIAGNOSIS — A74.9 POSITIVE CHLAMYDIA PCR: Primary | ICD-10-CM

## 2025-08-18 RX ORDER — AZITHROMYCIN 500 MG/1
1000 TABLET, FILM COATED ORAL ONCE
Qty: 2 TABLET | Refills: 0 | Status: SHIPPED | OUTPATIENT
Start: 2025-08-18 | End: 2025-08-18

## 2025-08-19 ENCOUNTER — APPOINTMENT (OUTPATIENT)
Dept: RADIOLOGY | Facility: CLINIC | Age: 30
End: 2025-08-19
Payer: MEDICAID

## 2025-08-21 ENCOUNTER — HOSPITAL ENCOUNTER (OUTPATIENT)
Dept: RADIOLOGY | Facility: HOSPITAL | Age: 30
Discharge: HOME | End: 2025-08-21
Payer: MEDICAID

## 2025-08-21 DIAGNOSIS — Z97.5 IUD (INTRAUTERINE DEVICE) IN PLACE: ICD-10-CM

## 2025-08-21 DIAGNOSIS — R10.2 PELVIC PAIN: ICD-10-CM

## 2025-08-21 PROCEDURE — 76830 TRANSVAGINAL US NON-OB: CPT

## 2025-08-23 ENCOUNTER — HOSPITAL ENCOUNTER (EMERGENCY)
Facility: HOSPITAL | Age: 30
Discharge: HOME | End: 2025-08-24
Payer: MEDICAID

## 2025-08-23 DIAGNOSIS — A74.9 CHLAMYDIA INFECTION: ICD-10-CM

## 2025-08-23 DIAGNOSIS — N76.0 BV (BACTERIAL VAGINOSIS): ICD-10-CM

## 2025-08-23 DIAGNOSIS — R10.2 PELVIC PAIN IN FEMALE: Primary | ICD-10-CM

## 2025-08-23 DIAGNOSIS — B96.89 BV (BACTERIAL VAGINOSIS): ICD-10-CM

## 2025-08-23 LAB
BASOPHILS # BLD AUTO: 0.03 X10*3/UL (ref 0–0.1)
BASOPHILS NFR BLD AUTO: 0.3 %
EOSINOPHIL # BLD AUTO: 0.11 X10*3/UL (ref 0–0.7)
EOSINOPHIL NFR BLD AUTO: 1.2 %
ERYTHROCYTE [DISTWIDTH] IN BLOOD BY AUTOMATED COUNT: 13.7 % (ref 11.5–14.5)
HCT VFR BLD AUTO: 37.7 % (ref 36–46)
HGB BLD-MCNC: 12.6 G/DL (ref 12–16)
IMM GRANULOCYTES # BLD AUTO: 0.02 X10*3/UL (ref 0–0.7)
IMM GRANULOCYTES NFR BLD AUTO: 0.2 % (ref 0–0.9)
LYMPHOCYTES # BLD AUTO: 3.23 X10*3/UL (ref 1.2–4.8)
LYMPHOCYTES NFR BLD AUTO: 35.9 %
MCH RBC QN AUTO: 28.9 PG (ref 26–34)
MCHC RBC AUTO-ENTMCNC: 33.4 G/DL (ref 32–36)
MCV RBC AUTO: 87 FL (ref 80–100)
MONOCYTES # BLD AUTO: 0.85 X10*3/UL (ref 0.1–1)
MONOCYTES NFR BLD AUTO: 9.5 %
NEUTROPHILS # BLD AUTO: 4.75 X10*3/UL (ref 1.2–7.7)
NEUTROPHILS NFR BLD AUTO: 52.9 %
NRBC BLD-RTO: 0 /100 WBCS (ref 0–0)
PLATELET # BLD AUTO: 263 X10*3/UL (ref 150–450)
RBC # BLD AUTO: 4.36 X10*6/UL (ref 4–5.2)
WBC # BLD AUTO: 9 X10*3/UL (ref 4.4–11.3)

## 2025-08-23 PROCEDURE — 96374 THER/PROPH/DIAG INJ IV PUSH: CPT

## 2025-08-23 PROCEDURE — 80053 COMPREHEN METABOLIC PANEL: CPT

## 2025-08-23 PROCEDURE — 85025 COMPLETE CBC W/AUTO DIFF WBC: CPT

## 2025-08-23 PROCEDURE — 99285 EMERGENCY DEPT VISIT HI MDM: CPT | Mod: 25

## 2025-08-23 PROCEDURE — 36415 COLL VENOUS BLD VENIPUNCTURE: CPT

## 2025-08-23 PROCEDURE — 96375 TX/PRO/DX INJ NEW DRUG ADDON: CPT

## 2025-08-23 PROCEDURE — 2500000004 HC RX 250 GENERAL PHARMACY W/ HCPCS (ALT 636 FOR OP/ED)

## 2025-08-23 PROCEDURE — 83605 ASSAY OF LACTIC ACID: CPT

## 2025-08-23 RX ORDER — KETOROLAC TROMETHAMINE 15 MG/ML
15 INJECTION, SOLUTION INTRAMUSCULAR; INTRAVENOUS ONCE
Status: COMPLETED | OUTPATIENT
Start: 2025-08-23 | End: 2025-08-23

## 2025-08-23 RX ORDER — ONDANSETRON HYDROCHLORIDE 2 MG/ML
4 INJECTION, SOLUTION INTRAVENOUS ONCE
Status: COMPLETED | OUTPATIENT
Start: 2025-08-23 | End: 2025-08-23

## 2025-08-23 RX ADMIN — KETOROLAC TROMETHAMINE 15 MG: 15 INJECTION, SOLUTION INTRAMUSCULAR; INTRAVENOUS at 23:43

## 2025-08-23 RX ADMIN — ONDANSETRON 4 MG: 2 INJECTION, SOLUTION INTRAMUSCULAR; INTRAVENOUS at 23:41

## 2025-08-23 ASSESSMENT — PAIN SCALES - GENERAL: PAINLEVEL_OUTOF10: 8

## 2025-08-23 ASSESSMENT — PAIN DESCRIPTION - PROGRESSION: CLINICAL_PROGRESSION: NOT CHANGED

## 2025-08-23 ASSESSMENT — PAIN DESCRIPTION - LOCATION: LOCATION: ABDOMEN

## 2025-08-23 ASSESSMENT — PAIN - FUNCTIONAL ASSESSMENT: PAIN_FUNCTIONAL_ASSESSMENT: 0-10

## 2025-08-24 ENCOUNTER — APPOINTMENT (OUTPATIENT)
Dept: RADIOLOGY | Facility: HOSPITAL | Age: 30
End: 2025-08-24
Payer: MEDICAID

## 2025-08-24 VITALS
OXYGEN SATURATION: 98 % | HEIGHT: 67 IN | DIASTOLIC BLOOD PRESSURE: 80 MMHG | SYSTOLIC BLOOD PRESSURE: 133 MMHG | WEIGHT: 216 LBS | HEART RATE: 80 BPM | RESPIRATION RATE: 16 BRPM | BODY MASS INDEX: 33.9 KG/M2 | TEMPERATURE: 98.1 F

## 2025-08-24 LAB
ALBUMIN SERPL BCP-MCNC: 4.1 G/DL (ref 3.4–5)
ALP SERPL-CCNC: 75 U/L (ref 33–110)
ALT SERPL W P-5'-P-CCNC: 28 U/L (ref 7–45)
ANION GAP SERPL CALCULATED.3IONS-SCNC: 13 MMOL/L (ref 10–20)
APPEARANCE UR: CLEAR
AST SERPL W P-5'-P-CCNC: 27 U/L (ref 9–39)
BACTERIA #/AREA URNS AUTO: ABNORMAL /HPF
BILIRUB SERPL-MCNC: 0.5 MG/DL (ref 0–1.2)
BILIRUB UR STRIP.AUTO-MCNC: NEGATIVE MG/DL
BUN SERPL-MCNC: 17 MG/DL (ref 6–23)
CALCIUM SERPL-MCNC: 8.7 MG/DL (ref 8.6–10.3)
CHLORIDE SERPL-SCNC: 105 MMOL/L (ref 98–107)
CLUE CELLS SPEC QL WET PREP: PRESENT
CO2 SERPL-SCNC: 21 MMOL/L (ref 21–32)
COLOR UR: YELLOW
CREAT SERPL-MCNC: 0.87 MG/DL (ref 0.5–1.05)
EGFRCR SERPLBLD CKD-EPI 2021: >90 ML/MIN/1.73M*2
GLUCOSE SERPL-MCNC: 79 MG/DL (ref 74–99)
GLUCOSE UR STRIP.AUTO-MCNC: NORMAL MG/DL
HCG UR QL IA.RAPID: NEGATIVE
KETONES UR STRIP.AUTO-MCNC: ABNORMAL MG/DL
LACTATE SERPL-SCNC: 0.6 MMOL/L (ref 0.4–2)
LEUKOCYTE ESTERASE UR QL STRIP.AUTO: ABNORMAL
MUCOUS THREADS #/AREA URNS AUTO: ABNORMAL /LPF
NITRITE UR QL STRIP.AUTO: NEGATIVE
PH UR STRIP.AUTO: 5.5 [PH]
POTASSIUM SERPL-SCNC: 3.5 MMOL/L (ref 3.5–5.3)
PROT SERPL-MCNC: 7.2 G/DL (ref 6.4–8.2)
PROT UR STRIP.AUTO-MCNC: NEGATIVE MG/DL
RBC # UR STRIP.AUTO: ABNORMAL MG/DL
RBC #/AREA URNS AUTO: ABNORMAL /HPF
SODIUM SERPL-SCNC: 135 MMOL/L (ref 136–145)
SP GR UR STRIP.AUTO: 1.05
SQUAMOUS #/AREA URNS AUTO: ABNORMAL /HPF
T VAGINALIS SPEC QL WET PREP: ABNORMAL
UROBILINOGEN UR STRIP.AUTO-MCNC: NORMAL MG/DL
WBC #/AREA URNS AUTO: ABNORMAL /HPF
WBC VAG QL WET PREP: ABNORMAL
YEAST VAG QL WET PREP: ABNORMAL

## 2025-08-24 PROCEDURE — 2500000001 HC RX 250 WO HCPCS SELF ADMINISTERED DRUGS (ALT 637 FOR MEDICARE OP)

## 2025-08-24 PROCEDURE — 81001 URINALYSIS AUTO W/SCOPE: CPT

## 2025-08-24 PROCEDURE — 87210 SMEAR WET MOUNT SALINE/INK: CPT

## 2025-08-24 PROCEDURE — 96375 TX/PRO/DX INJ NEW DRUG ADDON: CPT

## 2025-08-24 PROCEDURE — 87086 URINE CULTURE/COLONY COUNT: CPT | Mod: WESLAB

## 2025-08-24 PROCEDURE — 87205 SMEAR GRAM STAIN: CPT | Mod: WESLAB

## 2025-08-24 PROCEDURE — 74177 CT ABD & PELVIS W/CONTRAST: CPT | Performed by: STUDENT IN AN ORGANIZED HEALTH CARE EDUCATION/TRAINING PROGRAM

## 2025-08-24 PROCEDURE — 96376 TX/PRO/DX INJ SAME DRUG ADON: CPT

## 2025-08-24 PROCEDURE — 81025 URINE PREGNANCY TEST: CPT

## 2025-08-24 PROCEDURE — 87491 CHLMYD TRACH DNA AMP PROBE: CPT | Mod: WESLAB

## 2025-08-24 PROCEDURE — 2550000001 HC RX 255 CONTRASTS

## 2025-08-24 PROCEDURE — 96372 THER/PROPH/DIAG INJ SC/IM: CPT

## 2025-08-24 PROCEDURE — 74177 CT ABD & PELVIS W/CONTRAST: CPT

## 2025-08-24 PROCEDURE — 2500000004 HC RX 250 GENERAL PHARMACY W/ HCPCS (ALT 636 FOR OP/ED): Mod: JZ

## 2025-08-24 RX ORDER — DOXYCYCLINE 100 MG/1
100 CAPSULE ORAL ONCE
Status: COMPLETED | OUTPATIENT
Start: 2025-08-24 | End: 2025-08-24

## 2025-08-24 RX ORDER — MORPHINE SULFATE 4 MG/ML
4 INJECTION, SOLUTION INTRAMUSCULAR; INTRAVENOUS ONCE
Status: COMPLETED | OUTPATIENT
Start: 2025-08-24 | End: 2025-08-24

## 2025-08-24 RX ORDER — LIDOCAINE HYDROCHLORIDE 10 MG/ML
5 INJECTION, SOLUTION INFILTRATION; PERINEURAL ONCE
Status: COMPLETED | OUTPATIENT
Start: 2025-08-24 | End: 2025-08-24

## 2025-08-24 RX ORDER — CEFTRIAXONE 500 MG/1
500 INJECTION, POWDER, FOR SOLUTION INTRAMUSCULAR; INTRAVENOUS ONCE
Status: COMPLETED | OUTPATIENT
Start: 2025-08-24 | End: 2025-08-24

## 2025-08-24 RX ORDER — METRONIDAZOLE 500 MG/1
500 TABLET ORAL ONCE
Status: COMPLETED | OUTPATIENT
Start: 2025-08-24 | End: 2025-08-24

## 2025-08-24 RX ORDER — ONDANSETRON HYDROCHLORIDE 2 MG/ML
4 INJECTION, SOLUTION INTRAVENOUS ONCE
Status: COMPLETED | OUTPATIENT
Start: 2025-08-24 | End: 2025-08-24

## 2025-08-24 RX ORDER — DOXYCYCLINE 100 MG/1
100 CAPSULE ORAL 2 TIMES DAILY
Qty: 28 CAPSULE | Refills: 0 | Status: SHIPPED | OUTPATIENT
Start: 2025-08-24 | End: 2025-08-26 | Stop reason: SDUPTHER

## 2025-08-24 RX ADMIN — DOXYCYCLINE HYCLATE 100 MG: 100 CAPSULE ORAL at 01:34

## 2025-08-24 RX ADMIN — LIDOCAINE HYDROCHLORIDE 5 ML: 10 INJECTION, SOLUTION INFILTRATION; PERINEURAL at 01:34

## 2025-08-24 RX ADMIN — ONDANSETRON 4 MG: 2 INJECTION, SOLUTION INTRAMUSCULAR; INTRAVENOUS at 01:33

## 2025-08-24 RX ADMIN — IOHEXOL 75 ML: 350 INJECTION, SOLUTION INTRAVENOUS at 00:29

## 2025-08-24 RX ADMIN — CEFTRIAXONE SODIUM 500 MG: 500 INJECTION, POWDER, FOR SOLUTION INTRAMUSCULAR; INTRAVENOUS at 01:36

## 2025-08-24 RX ADMIN — MORPHINE SULFATE 4 MG: 4 INJECTION, SOLUTION INTRAMUSCULAR; INTRAVENOUS at 01:34

## 2025-08-24 RX ADMIN — METRONIDAZOLE 500 MG: 500 TABLET ORAL at 01:34

## 2025-08-25 LAB
BACTERIA UR CULT: NORMAL
C TRACH RRNA SPEC QL NAA+PROBE: POSITIVE
CLUE CELLS VAG LPF-#/AREA: NORMAL /[LPF]
N GONORRHOEA DNA SPEC QL PROBE+SIG AMP: NEGATIVE
NUGENT SCORE: 0 (ref ?–6)
YEAST VAG WET PREP-#/AREA: NORMAL

## 2025-08-26 ENCOUNTER — RESULTS FOLLOW-UP (OUTPATIENT)
Dept: PHARMACY | Facility: HOSPITAL | Age: 30
End: 2025-08-26
Payer: MEDICAID

## 2025-08-26 DIAGNOSIS — R10.2 PELVIC PAIN IN FEMALE: ICD-10-CM

## 2025-08-26 DIAGNOSIS — A74.9 CHLAMYDIA INFECTION: ICD-10-CM

## 2025-08-26 RX ORDER — DOXYCYCLINE 100 MG/1
100 CAPSULE ORAL 2 TIMES DAILY
Qty: 28 CAPSULE | Refills: 0 | Status: SHIPPED | OUTPATIENT
Start: 2025-08-26 | End: 2025-09-09

## 2025-09-10 ENCOUNTER — APPOINTMENT (OUTPATIENT)
Dept: OBSTETRICS AND GYNECOLOGY | Facility: CLINIC | Age: 30
End: 2025-09-10
Payer: MEDICAID

## (undated) DEVICE — TOWEL PACK, STERILE, 4/PACK, BLUE

## (undated) DEVICE — INTERPULSE HANDPIECE SET W/ 10FT SUCTION TUBING

## (undated) DEVICE — SUTURE, MONOCRYL, 3-0, 27 IN, PS-2, UNDYED

## (undated) DEVICE — GLOVE, SURGICAL, PROTEXIS PI MICRO, 8.0, PF, LF

## (undated) DEVICE — NEEDLE, SPINAL, QUINCKE, 18 G X 3.5 IN, PINK HUB

## (undated) DEVICE — WOUND SYSTEM, DEBRIDEMENT & CLEANING, O.R DUOPAK

## (undated) DEVICE — SUTURE, ETHIBOND XTRA, 5 V-37, GRN/BR, LF

## (undated) DEVICE — GLOVE, SURGICAL, PROTEXIS PI BLUE W/NEUTHERA, 8.0, PF, LF

## (undated) DEVICE — BLADE, SAW, SAGITTAL, DUAL CUT, 18 X 90 X 1.27

## (undated) DEVICE — SUTURE, STRATAFIX PDS PLUS, 1, OS-6, SYMMETRIC 60CM

## (undated) DEVICE — BANDAGE, ELASTIC, FLEXMASTER, 6 IN, DBL LENGTH, STERILE

## (undated) DEVICE — GLOVE, SURGICAL, PROTEXIS PI BLUE W/NEUTHERA, 7.0, PF, LF

## (undated) DEVICE — Device

## (undated) DEVICE — GLOVE, SURGICAL, PROTEXIS PI ORTHO, 8.0, PF, LF

## (undated) DEVICE — SUTURE, VICRYL, 1, 36 IN, CTX, VIOLET

## (undated) DEVICE — DRAPE, SHEET, THREE QUARTER, FAN FOLD, 57 X 77 IN

## (undated) DEVICE — SUTURE, CTD, VICRYL, 2-0, UND, BR, CT-2

## (undated) DEVICE — MARKER, SKIN, DUAL TIP INK W/9 LABEL AND REMOVABLE TIME OUT SLEEVE

## (undated) DEVICE — TOWEL, SURGICAL, NEURO, O/R, 16 X 26, BLUE, STERILE

## (undated) DEVICE — ADHESIVE, SKIN, LIQUIBAND EXCEED

## (undated) DEVICE — SOLUTION, INJECTION, USP, SODIUM CHLORIDE 0.9%, .9, NACL, 1000 ML, BAG

## (undated) DEVICE — SYRINGE, 60 CC, LUER LOCK, MONOJECT, W/O CAP, LF

## (undated) DEVICE — ADHESIVE, SKIN, DERMABOND ADVANCED, 15CM, PEN-STYLE

## (undated) DEVICE — SOLUTION, IRRIGATION, USP, SODIUM CHLORIDE 0.9%, 3000 ML, BAG

## (undated) DEVICE — DRAPE, INCISE, ANTIMICROBIAL, IOBAN 2, LARGE, 17 X 23 IN, DISPOSABLE, STERILE

## (undated) DEVICE — APPLICATOR, CHLORAPREP, W/ORANGE TINT, 26ML